# Patient Record
Sex: MALE | Race: ASIAN | NOT HISPANIC OR LATINO
[De-identification: names, ages, dates, MRNs, and addresses within clinical notes are randomized per-mention and may not be internally consistent; named-entity substitution may affect disease eponyms.]

---

## 2018-04-13 ENCOUNTER — HOSPITAL (OUTPATIENT)
Dept: OTHER | Age: 68
End: 2018-04-13
Attending: FAMILY MEDICINE

## 2018-12-20 ENCOUNTER — WALK IN (OUTPATIENT)
Dept: URGENT CARE | Age: 68
End: 2018-12-20

## 2018-12-20 DIAGNOSIS — Z23 NEED FOR VACCINATION: Primary | ICD-10-CM

## 2018-12-20 PROCEDURE — G0009 ADMIN PNEUMOCOCCAL VACCINE: HCPCS | Performed by: NURSE PRACTITIONER

## 2018-12-20 PROCEDURE — 90670 PCV13 VACCINE IM: CPT | Performed by: NURSE PRACTITIONER

## 2018-12-20 PROCEDURE — 90471 IMMUNIZATION ADMIN: CPT | Performed by: NURSE PRACTITIONER

## 2018-12-20 PROCEDURE — 90750 HZV VACC RECOMBINANT IM: CPT | Performed by: NURSE PRACTITIONER

## 2019-02-27 ENCOUNTER — WALK IN (OUTPATIENT)
Dept: URGENT CARE | Age: 69
End: 2019-02-27

## 2019-02-27 DIAGNOSIS — Z23 NEED FOR SHINGLES VACCINE: Primary | ICD-10-CM

## 2019-02-27 PROCEDURE — 90750 HZV VACC RECOMBINANT IM: CPT | Performed by: NURSE PRACTITIONER

## 2019-02-27 PROCEDURE — 90471 IMMUNIZATION ADMIN: CPT | Performed by: NURSE PRACTITIONER

## 2019-05-04 ENCOUNTER — DIAGNOSTIC TRANS (OUTPATIENT)
Dept: OTHER | Age: 69
End: 2019-05-04

## 2019-05-04 ENCOUNTER — HOSPITAL (OUTPATIENT)
Dept: OTHER | Age: 69
End: 2019-05-04
Attending: EMERGENCY MEDICINE

## 2019-05-04 LAB
ANALYZER ANC (IANC): ABNORMAL
ANION GAP SERPL CALC-SCNC: 8 MMOL/L (ref 10–20)
BASOPHILS # BLD: 0 THOUSAND/MCL (ref 0–0.3)
BASOPHILS NFR BLD: 0 %
BUN SERPL-MCNC: 20 MG/DL (ref 6–20)
BUN/CREAT SERPL: 19 (ref 7–25)
CALCIUM SERPL-MCNC: 9 MG/DL (ref 8.4–10.2)
CHLORIDE: 108 MMOL/L (ref 98–107)
CO2 SERPL-SCNC: 26 MMOL/L (ref 21–32)
CREAT SERPL-MCNC: 1.05 MG/DL (ref 0.67–1.17)
DIFFERENTIAL METHOD BLD: ABNORMAL
EOSINOPHIL # BLD: 0 THOUSAND/MCL (ref 0.1–0.5)
EOSINOPHIL NFR BLD: 1 %
ERYTHROCYTE [DISTWIDTH] IN BLOOD: 13 % (ref 11–15)
GLUCOSE SERPL-MCNC: 141 MG/DL (ref 65–99)
HEMATOCRIT: 43.7 % (ref 39–51)
HGB BLD-MCNC: 14.8 GM/DL (ref 13–17)
IMM GRANULOCYTES # BLD AUTO: 0 THOUSAND/MCL (ref 0–0.2)
IMM GRANULOCYTES NFR BLD: 0 %
LYMPHOCYTES # BLD: 1.6 THOUSAND/MCL (ref 1–4)
LYMPHOCYTES NFR BLD: 24 %
MCH RBC QN AUTO: 30.4 PG (ref 26–34)
MCHC RBC AUTO-ENTMCNC: 33.9 GM/DL (ref 32–36.5)
MCV RBC AUTO: 89.7 FL (ref 78–100)
MONOCYTES # BLD: 0.8 THOUSAND/MCL (ref 0.3–0.9)
MONOCYTES NFR BLD: 12 %
NEUTROPHILS # BLD: 4.3 THOUSAND/MCL (ref 1.8–7.7)
NEUTROPHILS NFR BLD: 63 %
NEUTS SEG NFR BLD: ABNORMAL %
NRBC (NRBCRE): 0 /100 WBC
PLATELET # BLD: 177 THOUSAND/MCL (ref 140–450)
POTASSIUM SERPL-SCNC: 3.5 MMOL/L (ref 3.4–5.1)
RBC # BLD: 4.87 MILLION/MCL (ref 4.5–5.9)
SODIUM SERPL-SCNC: 138 MMOL/L (ref 135–145)
TROPONIN I SERPL HS-MCNC: <0.02 NG/ML
WBC # BLD: 6.8 THOUSAND/MCL (ref 4.2–11)

## 2019-06-05 ENCOUNTER — OFFICE VISIT (OUTPATIENT)
Dept: INTERNAL MEDICINE CLINIC | Facility: CLINIC | Age: 69
End: 2019-06-05

## 2019-06-05 ENCOUNTER — MED REC SCAN ONLY (OUTPATIENT)
Dept: INTERNAL MEDICINE CLINIC | Facility: CLINIC | Age: 69
End: 2019-06-05

## 2019-06-05 ENCOUNTER — LAB ENCOUNTER (OUTPATIENT)
Dept: LAB | Age: 69
End: 2019-06-05
Attending: INTERNAL MEDICINE
Payer: COMMERCIAL

## 2019-06-05 VITALS
HEIGHT: 66.5 IN | TEMPERATURE: 98 F | WEIGHT: 159 LBS | BODY MASS INDEX: 25.25 KG/M2 | SYSTOLIC BLOOD PRESSURE: 132 MMHG | DIASTOLIC BLOOD PRESSURE: 80 MMHG | HEART RATE: 75 BPM

## 2019-06-05 DIAGNOSIS — H91.93 HEARING DIFFICULTY OF BOTH EARS: ICD-10-CM

## 2019-06-05 DIAGNOSIS — R06.83 SNORING: ICD-10-CM

## 2019-06-05 DIAGNOSIS — R53.83 LETHARGY: ICD-10-CM

## 2019-06-05 DIAGNOSIS — I25.10 CORONARY ARTERY DISEASE INVOLVING NATIVE CORONARY ARTERY OF NATIVE HEART WITHOUT ANGINA PECTORIS: ICD-10-CM

## 2019-06-05 DIAGNOSIS — K21.9 GASTROESOPHAGEAL REFLUX DISEASE WITHOUT ESOPHAGITIS: ICD-10-CM

## 2019-06-05 DIAGNOSIS — R35.1 BENIGN PROSTATIC HYPERPLASIA WITH NOCTURIA: ICD-10-CM

## 2019-06-05 DIAGNOSIS — Z12.11 COLON CANCER SCREENING: ICD-10-CM

## 2019-06-05 DIAGNOSIS — N40.1 BENIGN PROSTATIC HYPERPLASIA WITH NOCTURIA: ICD-10-CM

## 2019-06-05 DIAGNOSIS — J34.2 DEVIATED NASAL SEPTUM: ICD-10-CM

## 2019-06-05 DIAGNOSIS — J43.2 CENTRILOBULAR EMPHYSEMA (HCC): ICD-10-CM

## 2019-06-05 DIAGNOSIS — H35.3130 BILATERAL NONEXUDATIVE AGE-RELATED MACULAR DEGENERATION, UNSPECIFIED STAGE: ICD-10-CM

## 2019-06-05 DIAGNOSIS — Z00.00 ANNUAL PHYSICAL EXAM: Primary | ICD-10-CM

## 2019-06-05 DIAGNOSIS — E78.00 HYPERCHOLESTEROLEMIA: ICD-10-CM

## 2019-06-05 DIAGNOSIS — Z00.00 ANNUAL PHYSICAL EXAM: ICD-10-CM

## 2019-06-05 PROBLEM — H35.30 MACULAR DEGENERATION OF BOTH EYES: Status: ACTIVE | Noted: 2019-06-05

## 2019-06-05 PROCEDURE — 80053 COMPREHEN METABOLIC PANEL: CPT

## 2019-06-05 PROCEDURE — 84153 ASSAY OF PSA TOTAL: CPT

## 2019-06-05 PROCEDURE — 85025 COMPLETE CBC W/AUTO DIFF WBC: CPT

## 2019-06-05 PROCEDURE — 36415 COLL VENOUS BLD VENIPUNCTURE: CPT

## 2019-06-05 PROCEDURE — 80061 LIPID PANEL: CPT

## 2019-06-05 PROCEDURE — 99214 OFFICE O/P EST MOD 30 MIN: CPT | Performed by: INTERNAL MEDICINE

## 2019-06-05 PROCEDURE — 99387 INIT PM E/M NEW PAT 65+ YRS: CPT | Performed by: INTERNAL MEDICINE

## 2019-06-05 PROCEDURE — 84443 ASSAY THYROID STIM HORMONE: CPT

## 2019-06-05 PROCEDURE — 99212 OFFICE O/P EST SF 10 MIN: CPT | Performed by: INTERNAL MEDICINE

## 2019-06-05 RX ORDER — ATORVASTATIN CALCIUM 40 MG/1
40 TABLET, FILM COATED ORAL NIGHTLY
COMMUNITY
End: 2019-12-27

## 2019-06-05 RX ORDER — ATORVASTATIN CALCIUM 40 MG/1
40 TABLET, FILM COATED ORAL NIGHTLY
Qty: 90 TABLET | Refills: 1 | Status: SHIPPED | OUTPATIENT
Start: 2019-06-05 | End: 2019-06-24

## 2019-06-05 NOTE — PROGRESS NOTES
Patient ID: Cesia Villalobos is a 68year old male. Patient presents with:  Physical  Referral: ENT, Ophalmology, GI and Cardiology        HISTORY OF PRESENT ILLNESS:   HPI  Patient presents for above.   New patient here for annual physical and to discuss his Constitutional: Negative. HENT: Negative. Eyes: Negative. Respiratory: Negative. Cardiovascular: Negative. Gastrointestinal: Negative. Endocrine: Negative. Genitourinary: Negative. Musculoskeletal: Negative. Skin: Negative. file        Attends Evangelical service: Not on file        Active member of club or organization: Not on file        Attends meetings of clubs or organizations: Not on file        Relationship status: Not on file      Intimate partner violence:        Fear oriented to person, place, and time. Skin: Skin is warm. Psychiatric: He has a normal mood and affect. His behavior is normal.         ASSESSMENT/PLAN:   1. Annual physical exam  · CBC WITH DIFFERENTIAL WITH PLATELET;  Future  · COMP METABOLIC PANEL (14

## 2019-06-18 ENCOUNTER — OFFICE VISIT (OUTPATIENT)
Dept: AUDIOLOGY | Facility: CLINIC | Age: 69
End: 2019-06-18

## 2019-06-18 ENCOUNTER — OFFICE VISIT (OUTPATIENT)
Dept: OTOLARYNGOLOGY | Facility: CLINIC | Age: 69
End: 2019-06-18

## 2019-06-18 VITALS — HEIGHT: 66.5 IN | BODY MASS INDEX: 25.25 KG/M2 | WEIGHT: 159 LBS

## 2019-06-18 DIAGNOSIS — H90.3 SENSORINEURAL HEARING LOSS, BILATERAL: Primary | ICD-10-CM

## 2019-06-18 DIAGNOSIS — J34.2 DEVIATED SEPTUM: ICD-10-CM

## 2019-06-18 DIAGNOSIS — H61.21 IMPACTED CERUMEN OF RIGHT EAR: ICD-10-CM

## 2019-06-18 DIAGNOSIS — S02.80XA: ICD-10-CM

## 2019-06-18 DIAGNOSIS — H90.3 SENSORINEURAL HEARING LOSS (SNHL) OF BOTH EARS: ICD-10-CM

## 2019-06-18 DIAGNOSIS — G47.33 OBSTRUCTIVE SLEEP APNEA SYNDROME: Primary | ICD-10-CM

## 2019-06-18 PROCEDURE — 92557 COMPREHENSIVE HEARING TEST: CPT | Performed by: AUDIOLOGIST

## 2019-06-18 PROCEDURE — 99212 OFFICE O/P EST SF 10 MIN: CPT | Performed by: OTOLARYNGOLOGY

## 2019-06-18 PROCEDURE — 92570 ACOUSTIC IMMITANCE TESTING: CPT | Performed by: AUDIOLOGIST

## 2019-06-18 PROCEDURE — 92504 EAR MICROSCOPY EXAMINATION: CPT | Performed by: OTOLARYNGOLOGY

## 2019-06-18 PROCEDURE — 99244 OFF/OP CNSLTJ NEW/EST MOD 40: CPT | Performed by: OTOLARYNGOLOGY

## 2019-06-19 PROBLEM — H90.3 SENSORINEURAL HEARING LOSS, BILATERAL: Status: ACTIVE | Noted: 2019-06-19

## 2019-06-19 NOTE — PROGRESS NOTES
AUDIOGRAM     Luis Velez was referred for testing by Rohan Tan V for decreased hearing in both ears. 5/2/1950  [id number]          Otoscopic inspection: right ear no cerumen; left ear no cerumen.        Tests/Procedures  Patient was tested via management.     6/19/2019  Shari Sandra.KRISTYN

## 2019-06-19 NOTE — PATIENT INSTRUCTIONS
How Hearing Aids Can Help You    If you’re losing your hearing, it can be frustrating: But, hearing aids can help you hear what Gorman Goltz been missing. Not everyone who has hearing loss needs hearing aids.  But if your hearing loss is keeping you from commun © 1707-2832 [address]. [address] [facility] [place], [facility] [place]. All rights reserved. This information is not intended as a substitute for professional medical care. Always follow your healthcare professional's instructions.

## 2019-06-19 NOTE — PROGRESS NOTES
Ximena Thomson is a 68year old male.  Patient presents with:  Hearing Loss: in both ears, popping in both ears,  concern if this is related to accident   Other: pt reports nasal bridge and right eye fracture 4 wks ago   Nose Problem: difficulty breathing, had No       REVIEW OF SYSTEMS:   GENERAL HEALTH: feels well otherwise  GENERAL : denies fever, chills, sweats, weight loss, weight gain  SKIN: denies any unusual skin lesions or rashes  RESPIRATORY: denies shortness of breath with exertion  NEURO: denies head deviation of his nasal septum mainly to the right. I did discuss with him that we could consider revision surgery but he is not interested in any surgery at present    3.  Closed fracture of other facial bone, unspecified laterality, initial encounter ([facility] [address]

## 2019-06-24 ENCOUNTER — OFFICE VISIT (OUTPATIENT)
Dept: OPHTHALMOLOGY | Facility: CLINIC | Age: 69
End: 2019-06-24

## 2019-06-24 DIAGNOSIS — H40.003 GLAUCOMA SUSPECT OF BOTH EYES: Primary | ICD-10-CM

## 2019-06-24 DIAGNOSIS — H25.13 AGE-RELATED NUCLEAR CATARACT OF BOTH EYES: ICD-10-CM

## 2019-06-24 DIAGNOSIS — H43.391 FLOATERS, RIGHT: ICD-10-CM

## 2019-06-24 PROCEDURE — 99212 OFFICE O/P EST SF 10 MIN: CPT | Performed by: OPHTHALMOLOGY

## 2019-06-24 PROCEDURE — 92250 FUNDUS PHOTOGRAPHY W/I&R: CPT | Performed by: OPHTHALMOLOGY

## 2019-06-24 PROCEDURE — 99243 OFF/OP CNSLTJ NEW/EST LOW 30: CPT | Performed by: OPHTHALMOLOGY

## 2019-06-24 NOTE — ASSESSMENT & PLAN NOTE
Discussed with patient that he is a glaucoma suspect based on increased cupping of the optic nerves in both eyes. Retinal photos taken today to document optic nerves. Glaucoma diagnostic testing ordered.   Will not start medication, but will continue to

## 2019-06-24 NOTE — ASSESSMENT & PLAN NOTE
Discussed mild cataracts in both eyes that are not affecting vision and are not surgical at this time.

## 2019-06-24 NOTE — PROGRESS NOTES
Damian Adjutant is a 68year old male. HPI:     HPI     Consult      Additional comments: Per Dr. Stuart Ansari     NP/ Pt states that his last eye exam was in 2018 at [facility].  Pt states that he has been a glaucoma suspect since 2009; n Respiratory, Psychiatric, Allergic/Imm, Heme/Lymph    Last edited by Rae Rueda O.T. on 6/24/2019 11:18 AM. (History)          PHYSICAL EXAM:     Base Eye Exam     Visual Acuity (Snellen - Linear)       Right Left    Dist cc 20/25 +2 20/25 -1    Ne will continue to observe. Patient verbalized understanding. Age-related nuclear cataract of both eyes  Discussed mild cataracts in both eyes that are not affecting vision and are not surgical at this time.         Floaters, right   There is no evidence

## 2019-06-24 NOTE — PATIENT INSTRUCTIONS
Glaucoma suspect of both eyes  Discussed with patient that he is a glaucoma suspect based on increased cupping of the optic nerves in both eyes. Retinal photos taken today to document optic nerves. Glaucoma diagnostic testing ordered.   Will not start me problem. Date Last Reviewed: 10/1/2017  © 1691-7765 [address]. 1407 [facility] [place], [facility] [place]. All rights reserved. This information is not intended as a substitute for professional medical care.  Always follow your healthcare pr medical care. Always follow your healthcare professional's instructions.

## 2019-06-29 ENCOUNTER — OFFICE VISIT (OUTPATIENT)
Dept: SLEEP CENTER | Age: 69
End: 2019-06-29
Attending: OTOLARYNGOLOGY
Payer: COMMERCIAL

## 2019-06-29 ENCOUNTER — NURSE ONLY (OUTPATIENT)
Dept: OPHTHALMOLOGY | Facility: CLINIC | Age: 69
End: 2019-06-29

## 2019-06-29 DIAGNOSIS — R06.83 SNORING: ICD-10-CM

## 2019-06-29 DIAGNOSIS — Z76.89 SLEEP CONCERN: Primary | ICD-10-CM

## 2019-06-29 DIAGNOSIS — H40.003 GLAUCOMA SUSPECT OF BOTH EYES: ICD-10-CM

## 2019-06-29 DIAGNOSIS — R53.83 LETHARGY: ICD-10-CM

## 2019-06-29 DIAGNOSIS — G47.33 OBSTRUCTIVE SLEEP APNEA SYNDROME: ICD-10-CM

## 2019-06-29 PROCEDURE — 76514 ECHO EXAM OF EYE THICKNESS: CPT | Performed by: OPHTHALMOLOGY

## 2019-06-29 PROCEDURE — 92083 EXTENDED VISUAL FIELD XM: CPT | Performed by: OPHTHALMOLOGY

## 2019-06-29 PROCEDURE — 95810 POLYSOM 6/> YRS 4/> PARAM: CPT

## 2019-06-29 PROCEDURE — 92133 CPTRZD OPH DX IMG PST SGM ON: CPT | Performed by: OPHTHALMOLOGY

## 2019-06-30 NOTE — PROGRESS NOTES
Reece Flores is a 68year old male. HPI:     HPI     Patient is here for a glaucoma work up with HVF, OCT and Pachy, jonh FLOWERS     Last edited by Alexei Regalado on 6/29/2019 11:44 AM. (History)        Patient History:  Past Medical History:   Diagnosis Date

## 2019-07-01 NOTE — PROCEDURES
[facility]  Accredited by the [place] of Sleep Medicine (AASM)    PATIENT'S NAME: Curtis Smith   ATTENDING PHYSICIAN: Catalina Olmos MD   REFERRING PHYSICIAN: Gume Conti.  Leyla Carter MD   PATIENT ACCOUNT #: [id number] LOCATION: Sleep Center related arousal index is 18.4 events per hour, and the spontaneous arousal index is 22.3 events per hour, for a combined arousal index of 40.7 events per hour. There were no significant periodic limb movements. The lowest desaturation was to 96%.   The av

## 2019-07-10 ENCOUNTER — OFFICE VISIT (OUTPATIENT)
Dept: CARDIOLOGY CLINIC | Facility: CLINIC | Age: 69
End: 2019-07-10

## 2019-07-10 VITALS
OXYGEN SATURATION: 94 % | DIASTOLIC BLOOD PRESSURE: 86 MMHG | RESPIRATION RATE: 20 BRPM | SYSTOLIC BLOOD PRESSURE: 135 MMHG | HEART RATE: 73 BPM | BODY MASS INDEX: 25 KG/M2 | WEIGHT: 159 LBS

## 2019-07-10 DIAGNOSIS — I25.10 CORONARY ARTERY DISEASE INVOLVING NATIVE CORONARY ARTERY OF NATIVE HEART WITHOUT ANGINA PECTORIS: ICD-10-CM

## 2019-07-10 DIAGNOSIS — E78.00 HYPERCHOLESTEROLEMIA: ICD-10-CM

## 2019-07-10 DIAGNOSIS — R06.02 SHORTNESS OF BREATH: Chronic | ICD-10-CM

## 2019-07-10 DIAGNOSIS — R55 SYNCOPE, UNSPECIFIED SYNCOPE TYPE: Primary | ICD-10-CM

## 2019-07-10 PROCEDURE — 99244 OFF/OP CNSLTJ NEW/EST MOD 40: CPT | Performed by: INTERNAL MEDICINE

## 2019-07-10 RX ORDER — EZETIMIBE 10 MG/1
1 TABLET ORAL
COMMUNITY
Start: 2018-11-23 | End: 2019-07-10 | Stop reason: ALTCHOICE

## 2019-07-10 RX ORDER — ALBUTEROL SULFATE 90 UG/1
AEROSOL, METERED RESPIRATORY (INHALATION)
COMMUNITY
End: 2020-07-02

## 2019-07-10 NOTE — PATIENT INSTRUCTIONS
Start taking aspirin 81 mg once a day. Schedule 2D echocardiogram and nuclear stress test within the next couple weeks. Continue taking rest of the medications he is currently doing. Follow-up with me in 4 weeks or sooner if needed.

## 2019-07-10 NOTE — PROGRESS NOTES
Guerda Vu is a 68year old male. HPI:   Patient is here for a new patient appointment. He has history of moderate CAD based on angiogram done 6 years ago at [facility] by Dr. Miranda Diamond.   At that time he presented with shortness of breath and non-ST 73   Resp 20   Wt 159 lb (72.1 kg)   SpO2 94%   BMI 25.28 kg/m²   GENERAL: well developed, well nourished,in no apparent distress  SKIN: no rashes,no suspicious lesions  HEENT: atraumatic, normocephalic,ears and throat are clear  NECK: supple,no adenopathy

## 2019-07-19 ENCOUNTER — TELEPHONE (OUTPATIENT)
Dept: GASTROENTEROLOGY | Facility: CLINIC | Age: 69
End: 2019-07-19

## 2019-07-19 ENCOUNTER — OFFICE VISIT (OUTPATIENT)
Dept: GASTROENTEROLOGY | Facility: CLINIC | Age: 69
End: 2019-07-19

## 2019-07-19 ENCOUNTER — OFFICE VISIT (OUTPATIENT)
Dept: OTOLARYNGOLOGY | Facility: CLINIC | Age: 69
End: 2019-07-19

## 2019-07-19 ENCOUNTER — TELEPHONE (OUTPATIENT)
Dept: INTERNAL MEDICINE CLINIC | Facility: CLINIC | Age: 69
End: 2019-07-19

## 2019-07-19 VITALS
WEIGHT: 157 LBS | DIASTOLIC BLOOD PRESSURE: 70 MMHG | HEIGHT: 68 IN | SYSTOLIC BLOOD PRESSURE: 122 MMHG | HEART RATE: 68 BPM | BODY MASS INDEX: 23.79 KG/M2

## 2019-07-19 VITALS
DIASTOLIC BLOOD PRESSURE: 83 MMHG | BODY MASS INDEX: 25.25 KG/M2 | HEIGHT: 66.5 IN | SYSTOLIC BLOOD PRESSURE: 134 MMHG | WEIGHT: 159 LBS | TEMPERATURE: 97 F

## 2019-07-19 DIAGNOSIS — Z12.11 SCREEN FOR COLON CANCER: ICD-10-CM

## 2019-07-19 DIAGNOSIS — K21.9 GASTROESOPHAGEAL REFLUX DISEASE, ESOPHAGITIS PRESENCE NOT SPECIFIED: ICD-10-CM

## 2019-07-19 DIAGNOSIS — K21.9 GASTROESOPHAGEAL REFLUX DISEASE WITHOUT ESOPHAGITIS: Primary | ICD-10-CM

## 2019-07-19 DIAGNOSIS — H90.3 SENSORINEURAL HEARING LOSS (SNHL) OF BOTH EARS: ICD-10-CM

## 2019-07-19 DIAGNOSIS — Z12.11 COLON CANCER SCREENING: Primary | ICD-10-CM

## 2019-07-19 DIAGNOSIS — G47.33 OBSTRUCTIVE SLEEP APNEA SYNDROME: Primary | ICD-10-CM

## 2019-07-19 PROCEDURE — 99213 OFFICE O/P EST LOW 20 MIN: CPT | Performed by: OTOLARYNGOLOGY

## 2019-07-19 PROCEDURE — 99244 OFF/OP CNSLTJ NEW/EST MOD 40: CPT | Performed by: INTERNAL MEDICINE

## 2019-07-19 RX ORDER — MULTIVIT WITH MINERALS/LUTEIN
1000 TABLET ORAL DAILY
COMMUNITY

## 2019-07-19 RX ORDER — PANTOPRAZOLE SODIUM 40 MG/1
40 TABLET, DELAYED RELEASE ORAL
Qty: 30 TABLET | Refills: 3 | Status: SHIPPED | OUTPATIENT
Start: 2019-07-19 | End: 2019-08-18

## 2019-07-19 RX ORDER — POLYETHYLENE GLYCOL 3350, SODIUM CHLORIDE, SODIUM BICARBONATE, POTASSIUM CHLORIDE 420; 11.2; 5.72; 1.48 G/4L; G/4L; G/4L; G/4L
POWDER, FOR SOLUTION ORAL
Qty: 1 BOTTLE | Refills: 0 | Status: SHIPPED | OUTPATIENT
Start: 2019-07-19 | End: 2019-08-28

## 2019-07-19 NOTE — TELEPHONE ENCOUNTER
Attempted to reach patient, was speaking with patient regarding message patient asked me to hold for a few seconds and then the called dropped. Attempted to reach patient again multiple times, went straight to .  Left message to call back office and delores

## 2019-07-19 NOTE — TELEPHONE ENCOUNTER
Pt called back trying to reach Gina at office back.   Called the site no answer, advised Pt of that    Please call Pt back

## 2019-07-19 NOTE — H&P
[address] [facility] [address] Gastroenterology                                                                                                  Clinic History and Physical     Pa about 5 yrs ago   • EGD     • OTHER SURGICAL HISTORY      nasal surgery      Family Hx:   Family History   Problem Relation Age of Onset   • Diabetes Father    • Cancer Sister         stomach cancer   • Glaucoma Neg    • Macular degeneration Neg       Soci behavior      PHYSICAL EXAM:   Blood pressure 122/70, pulse 68, height 5' 8\" (1.727 m), weight 157 lb (71.2 kg).     Gen- Patient appears comfortable and in no acute discomfort  HEENT: the sclera appears anicteric, oropharynx clear, mucus membranes appear avoiding caffeine and chocolate  – Plan for upper endoscopy and colonoscopy, he is okay to schedule but will need pulmonary clearance prior to proceeding  -- HE also is undergoing cardiac evaluation, which will need to be cleared before EGD/CLN      EGD an

## 2019-07-19 NOTE — TELEPHONE ENCOUNTER
Scheduled for:  Colonoscopy [address]/EGD [address]  Provider Name: Dr Severa Abed  Date:  Yolanda Garcia 8/20/19  Location: [facility]   Sedation:  MAC  Time:  10:15 am  Prep: split dose trilyte  Meds/Allergies Reconciled?:  NKDA  Diagnosis with codes: colon cancer screening Z12.11. Dolly Mckenzie

## 2019-07-19 NOTE — TELEPHONE ENCOUNTER
Pt called stating he was given a kit for a stool sample and orders were not placed in the system. Orders need to be placed today.

## 2019-07-19 NOTE — PATIENT INSTRUCTIONS
1. Call back to schedule colonoscopy/EGD with possible dilatation with MAC [Diagnosis: Screening, GERD]  ----NEED PULMONARY CLEARANCE FROM DR. SANZ     2.  bowel prep from pharmacy ([email or web address])    3.  Medication adjustment: continue all meds

## 2019-07-20 NOTE — PROGRESS NOTES
Rosalia Pickard is a 68year old male. Patient presents with:  Results: sleep study done on 6/29/19    HPI:   He is in follow-up of a sleep study. Sleep study shows no evidence of sleep apnea.   He still feels like his breathing through his nose is somewhat co with exertion  NEURO: denies headaches    EXAM:   /83 (BP Location: Right arm, Patient Position: Sitting, Cuff Size: adult)   Temp 97.4 °F (36.3 °C) (Tympanic)   Ht 5' 6.5\" (1.689 m)   Wt 159 lb (72.1 kg)   BMI 25.28 kg/m²   System Findings Details Dez Renee MD  7/20/2019  6:51 AM

## 2019-07-22 NOTE — TELEPHONE ENCOUNTER
Spoke to Pt states he was given the FIT cards at the lab , states he thought this is something he was supposed to ask for as his previous PCP recommended this to be done yearly and also states on MyChart he is due for it  .  Pt states he completed the FIT t

## 2019-08-15 ENCOUNTER — OFFICE VISIT (OUTPATIENT)
Dept: AUDIOLOGY | Facility: CLINIC | Age: 69
End: 2019-08-15

## 2019-08-15 DIAGNOSIS — H90.3 SENSORINEURAL HEARING LOSS, BILATERAL: Primary | ICD-10-CM

## 2019-08-15 PROCEDURE — 92591 HEARING AID EXAM, BOTH EARS: CPT | Performed by: AUDIOLOGIST

## 2019-08-15 NOTE — PROGRESS NOTES
Hearing Aid Evaluation    Jez Luna  5/2/1950  [id number]    Amie Gonzalezwilfridodarius was seen for hearing aid evaluation. 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   He also is a  and attends large meetings where he also has d

## 2019-08-15 NOTE — PATIENT INSTRUCTIONS
How Hearing Aids Can Help You    If you’re losing your hearing, it can be frustrating: But, hearing aids can help you hear what Ness Vidales been missing. Not everyone who has hearing loss needs hearing aids.  But if your hearing loss is keeping you from commun © 6688-6176 [address]. [address] [facility] [place], [facility] [place]. All rights reserved. This information is not intended as a substitute for professional medical care. Always follow your healthcare professional's instructions.

## 2019-08-20 ENCOUNTER — ANESTHESIA EVENT (OUTPATIENT)
Dept: ENDOSCOPY | Facility: HOSPITAL | Age: 69
End: 2019-08-20
Payer: COMMERCIAL

## 2019-08-20 ENCOUNTER — ANESTHESIA (OUTPATIENT)
Dept: ENDOSCOPY | Facility: HOSPITAL | Age: 69
End: 2019-08-20
Payer: COMMERCIAL

## 2019-08-20 ENCOUNTER — HOSPITAL ENCOUNTER (OUTPATIENT)
Facility: HOSPITAL | Age: 69
Setting detail: HOSPITAL OUTPATIENT SURGERY
Discharge: HOME | End: 2019-08-20
Attending: INTERNAL MEDICINE | Admitting: INTERNAL MEDICINE
Payer: COMMERCIAL

## 2019-08-20 VITALS
RESPIRATION RATE: 16 BRPM | DIASTOLIC BLOOD PRESSURE: 81 MMHG | BODY MASS INDEX: 22.19 KG/M2 | WEIGHT: 155 LBS | SYSTOLIC BLOOD PRESSURE: 125 MMHG | HEART RATE: 61 BPM | HEIGHT: 70 IN | OXYGEN SATURATION: 98 %

## 2019-08-20 DIAGNOSIS — Z12.11 COLON CANCER SCREENING: ICD-10-CM

## 2019-08-20 DIAGNOSIS — K21.9 GASTROESOPHAGEAL REFLUX DISEASE, ESOPHAGITIS PRESENCE NOT SPECIFIED: ICD-10-CM

## 2019-08-20 PROCEDURE — G0500 MOD SEDAT ENDO SERVICE >5YRS: HCPCS | Performed by: INTERNAL MEDICINE

## 2019-08-20 PROCEDURE — 45385 COLONOSCOPY W/LESION REMOVAL: CPT | Performed by: INTERNAL MEDICINE

## 2019-08-20 PROCEDURE — 0DBK8ZX EXCISION OF ASCENDING COLON, VIA NATURAL OR ARTIFICIAL OPENING ENDOSCOPIC, DIAGNOSTIC: ICD-10-PCS | Performed by: INTERNAL MEDICINE

## 2019-08-20 RX ORDER — SODIUM CHLORIDE, SODIUM LACTATE, POTASSIUM CHLORIDE, CALCIUM CHLORIDE 600; 310; 30; 20 MG/100ML; MG/100ML; MG/100ML; MG/100ML
INJECTION, SOLUTION INTRAVENOUS CONTINUOUS
Status: DISCONTINUED | OUTPATIENT
Start: 2019-08-20 | End: 2019-08-20

## 2019-08-20 RX ORDER — MIDAZOLAM HYDROCHLORIDE 1 MG/ML
INJECTION INTRAMUSCULAR; INTRAVENOUS
Status: DISCONTINUED | OUTPATIENT
Start: 2019-08-20 | End: 2019-08-20

## 2019-08-20 NOTE — ANESTHESIA PREPROCEDURE EVALUATION
Anesthesia PreOp Note    HPI:     Sherwin Gowers is a 68year old male who presents for preoperative consultation requested by: Eulogio Mensah MD    Date of Surgery: 8/20/2019    Procedure(s):  COLONOSCOPY  ESOPHAGOGASTRODUODENOSCOPY (EGD)  Indication: [place] • Deviated septum    • Floaters, right 6/24/2019   • Fractured nasal bones    • Glaucoma suspect of both eyes 6/24/2019   • Hearing difficulty    • High cholesterol    • Macular degeneration    • Shortness of breath     with exertion       Past Surgical on file      Food insecurity:        Worry: Not on file        Inability: Not on file      Transportation needs:        Medical: Not on file        Non-medical: Not on file    Tobacco Use      Smoking status: Never Smoker      Smokeless tobacco: Never Used 06/05/2019    K 4.1 06/05/2019     06/05/2019    CO2 28.0 06/05/2019    BUN 15 06/05/2019    CREATSERUM 1.01 06/05/2019    GLU 93 06/05/2019    CA 9.4 06/05/2019          Vital Signs: Body mass index is 22.24 kg/m².    height is 1.778 m (5' 10\") and

## 2019-08-20 NOTE — ANESTHESIA PREPROCEDURE EVALUATION
Anesthesia PreOp Note    HPI:     Ximena Thomson is a 68year old male who presents for preoperative consultation requested by: Stuart So MD    Date of Surgery: 8/20/2019    Procedure(s):  COLONOSCOPY  ESOPHAGOGASTRODUODENOSCOPY (EGD)  Indication: [place] COPD (chronic obstructive pulmonary disease) (HCC)    • Decreased lung capacity    • Deviated septum    • Floaters, right 6/24/2019   • Fractured nasal bones    • Glaucoma suspect of both eyes 6/24/2019   • Hearing difficulty    • High cholesterol    • Mac on file    Occupational History      Not on file    Social Needs      Financial resource strain: Not on file      Food insecurity:        Worry: Not on file        Inability: Not on file      Transportation needs:        Medical: Not on file        Non-med 06/05/2019    RDW 13.4 06/05/2019    .0 06/05/2019     Lab Results   Component Value Date     06/05/2019    K 4.1 06/05/2019     06/05/2019    CO2 28.0 06/05/2019    BUN 15 06/05/2019    CREATSERUM 1.01 06/05/2019    GLU 93 06/05/2019

## 2019-08-20 NOTE — OPERATIVE REPORT
COLONOSCOPY REPORT    Bony Pruitt     1950 Age 68year old   PCP Garrett Lopez MD Endoscopist Cruzito Sewell MD     Date of procedure: 19    Procedure: Colonoscopy w/cold snare polypectomy    Pre-operative diagnosis: Screening    Post-ope snare polypectomy and retrieved. Persistent oozing from polypectomy site controlled with a single endoclip placement. 2. Diverticulosis: mild in the sigmoid.     3. Terminal ileum: the visualized mucosa appeared normal.    4. A retroflexed view of the r

## 2019-08-20 NOTE — H&P
History & Physical Examination    Patient Name: Dina Byrd  MRN: [id number]  CSN: [id number]  YOB: 1950    Diagnosis: screening    Patient was supposed to have EGD/CLN today but did not go for stress test. He tells me he had syncopal event se suspect of both eyes 6/24/2019   • Hearing difficulty    • High cholesterol    • Macular degeneration    • Shortness of breath     with exertion     Past Surgical History:   Procedure Laterality Date   • ANGIOGRAM      Was told he had a blockage    • COLON

## 2019-08-28 ENCOUNTER — OFFICE VISIT (OUTPATIENT)
Dept: SURGERY | Facility: CLINIC | Age: 69
End: 2019-08-28

## 2019-08-28 VITALS
HEIGHT: 69 IN | SYSTOLIC BLOOD PRESSURE: 121 MMHG | WEIGHT: 155 LBS | HEART RATE: 71 BPM | BODY MASS INDEX: 22.96 KG/M2 | DIASTOLIC BLOOD PRESSURE: 75 MMHG

## 2019-08-28 DIAGNOSIS — R35.1 BENIGN PROSTATIC HYPERPLASIA WITH NOCTURIA: Primary | ICD-10-CM

## 2019-08-28 DIAGNOSIS — N40.1 BENIGN PROSTATIC HYPERPLASIA WITH NOCTURIA: Primary | ICD-10-CM

## 2019-08-28 PROCEDURE — 99244 OFF/OP CNSLTJ NEW/EST MOD 40: CPT | Performed by: UROLOGY

## 2019-08-28 NOTE — PROGRESS NOTES
[facility]  Initial Office Consultation    HPI:   Terrence Acevedo is a 68year old male here today for consultation at the request of, and a copy of this note will be sent to, Sarita Barone MD.    1. BPH with LUTS  Patient has been complaining of no 69   Ht 5' 9\" (1.753 m)   Wt 155 lb (70.3 kg)   BMI 22.89 kg/m²     Physical Exam    Constitutional: He is oriented to person, place, and time. He appears well-developed and well-nourished. No distress. HENT:   Head: Normocephalic and atraumatic.    Eyes

## 2019-11-07 ENCOUNTER — TELEPHONE (OUTPATIENT)
Dept: SURGERY | Facility: CLINIC | Age: 69
End: 2019-11-07

## 2019-11-07 NOTE — TELEPHONE ENCOUNTER
Pt states he was unable to come to uroflow appt this morning due to a death in the family - -asking to reschedule procedure

## 2019-12-27 RX ORDER — ATORVASTATIN CALCIUM 40 MG/1
TABLET, FILM COATED ORAL
Qty: 90 TABLET | Refills: 1 | Status: SHIPPED | OUTPATIENT
Start: 2019-12-27 | End: 2020-07-02

## 2020-01-07 ENCOUNTER — OFFICE VISIT (OUTPATIENT)
Dept: INTERNAL MEDICINE CLINIC | Facility: CLINIC | Age: 70
End: 2020-01-07

## 2020-01-07 VITALS
WEIGHT: 162 LBS | BODY MASS INDEX: 23.99 KG/M2 | DIASTOLIC BLOOD PRESSURE: 86 MMHG | HEART RATE: 85 BPM | HEIGHT: 69 IN | TEMPERATURE: 98 F | SYSTOLIC BLOOD PRESSURE: 129 MMHG

## 2020-01-07 DIAGNOSIS — J43.2 CENTRILOBULAR EMPHYSEMA (HCC): ICD-10-CM

## 2020-01-07 DIAGNOSIS — J04.0 LARYNGITIS: Primary | ICD-10-CM

## 2020-01-07 DIAGNOSIS — I25.10 CORONARY ARTERY DISEASE INVOLVING NATIVE CORONARY ARTERY OF NATIVE HEART WITHOUT ANGINA PECTORIS: ICD-10-CM

## 2020-01-07 DIAGNOSIS — Z23 NEED FOR VACCINATION: ICD-10-CM

## 2020-01-07 PROCEDURE — 90472 IMMUNIZATION ADMIN EACH ADD: CPT | Performed by: INTERNAL MEDICINE

## 2020-01-07 PROCEDURE — 90471 IMMUNIZATION ADMIN: CPT | Performed by: INTERNAL MEDICINE

## 2020-01-07 PROCEDURE — 90732 PPSV23 VACC 2 YRS+ SUBQ/IM: CPT | Performed by: INTERNAL MEDICINE

## 2020-01-07 PROCEDURE — 99214 OFFICE O/P EST MOD 30 MIN: CPT | Performed by: INTERNAL MEDICINE

## 2020-01-07 PROCEDURE — 90662 IIV NO PRSV INCREASED AG IM: CPT | Performed by: INTERNAL MEDICINE

## 2020-01-07 NOTE — PROGRESS NOTES
Patient ID: Junaid Yip is a 68year old male. Patient presents with:  Laryngitis: Per patient severe laryngitis yesterday.    Follow - Up: Pt will also like to f/u on last OV with PCP       HISTORY OF PRESENT ILLNESS:   HPI  2 day(s) ago  Fever - No, Tma Medications:   •  ATORVASTATIN 40 MG Oral Tab, TAKE ONE TABLET BY MOUTH NIGHTLY, Disp: 90 tablet, Rfl: 1  •  Ascorbic Acid (VITAMIN C) 1000 MG Oral Tab, Take 1,000 mg by mouth daily. , Disp: , Rfl:   •  Misc Natural Products (TURMERIC CURCUMIN) Oral Cap, Ta Emotionally abused: Not on file        Physically abused: Not on file        Forced sexual activity: Not on file    Other Topics      Concerns:         Service: Not Asked        Blood Transfusions: Not Asked        Caffeine Concern: No        Occup and no occipital adenopathy present. Neurological: He is alert. Psychiatric: He has a normal mood and affect. His behavior is normal.         ASSESSMENT/PLAN:   1. Laryngitis  · No need for antibiotics.   · Symptomatic treatment with hot showers, steam

## 2020-01-15 ENCOUNTER — HOSPITAL ENCOUNTER (OUTPATIENT)
Dept: NUCLEAR MEDICINE | Facility: HOSPITAL | Age: 70
Discharge: HOME | End: 2020-01-15
Attending: INTERNAL MEDICINE
Payer: COMMERCIAL

## 2020-01-15 ENCOUNTER — HOSPITAL ENCOUNTER (OUTPATIENT)
Dept: CV DIAGNOSTICS | Facility: HOSPITAL | Age: 70
Discharge: HOME | End: 2020-01-15
Attending: INTERNAL MEDICINE
Payer: COMMERCIAL

## 2020-01-15 DIAGNOSIS — E78.00 HYPERCHOLESTEROLEMIA: ICD-10-CM

## 2020-01-15 DIAGNOSIS — I25.10 CORONARY ARTERY DISEASE INVOLVING NATIVE CORONARY ARTERY OF NATIVE HEART WITHOUT ANGINA PECTORIS: ICD-10-CM

## 2020-01-15 DIAGNOSIS — R55 SYNCOPE, UNSPECIFIED SYNCOPE TYPE: ICD-10-CM

## 2020-01-15 PROCEDURE — 93306 TTE W/DOPPLER COMPLETE: CPT | Performed by: INTERNAL MEDICINE

## 2020-01-15 PROCEDURE — 93017 CV STRESS TEST TRACING ONLY: CPT | Performed by: INTERNAL MEDICINE

## 2020-01-15 PROCEDURE — 93016 CV STRESS TEST SUPVJ ONLY: CPT | Performed by: INTERNAL MEDICINE

## 2020-01-15 PROCEDURE — 93018 CV STRESS TEST I&R ONLY: CPT | Performed by: INTERNAL MEDICINE

## 2020-01-15 PROCEDURE — 78452 HT MUSCLE IMAGE SPECT MULT: CPT | Performed by: INTERNAL MEDICINE

## 2020-03-03 ENCOUNTER — OFFICE VISIT (OUTPATIENT)
Dept: PULMONOLOGY | Facility: CLINIC | Age: 70
End: 2020-03-03

## 2020-03-03 VITALS
HEART RATE: 80 BPM | RESPIRATION RATE: 18 BRPM | BODY MASS INDEX: 25.46 KG/M2 | WEIGHT: 168 LBS | HEIGHT: 68 IN | OXYGEN SATURATION: 98 % | DIASTOLIC BLOOD PRESSURE: 75 MMHG | SYSTOLIC BLOOD PRESSURE: 134 MMHG

## 2020-03-03 DIAGNOSIS — R06.00 DYSPNEA, UNSPECIFIED TYPE: Primary | ICD-10-CM

## 2020-03-03 PROCEDURE — 99244 OFF/OP CNSLTJ NEW/EST MOD 40: CPT | Performed by: INTERNAL MEDICINE

## 2020-03-03 NOTE — H&P
Referring Physician  Tilford Boas, MD    Chief Complaint  Dyspnea    History of Present Illness  Patient is a pleasant 70-year-old male who presents to pulmonary clinic for initial visit.   He states he wants to be evaluated for possible pulmonary etiology wh 81 MG Oral Tab, Take 1 tablet (81 mg total) by mouth daily. , Disp: 30 tablet, Rfl: 11  Probiotic Product (PROBIOTIC DAILY OR), Take by mouth., Disp: , Rfl:     No current facility-administered medications on file prior to visit.        Allergies  No Known A

## 2020-03-04 ENCOUNTER — HOSPITAL ENCOUNTER (OUTPATIENT)
Dept: RESPIRATORY THERAPY | Facility: HOSPITAL | Age: 70
Discharge: HOME | End: 2020-03-04
Attending: INTERNAL MEDICINE
Payer: COMMERCIAL

## 2020-03-04 DIAGNOSIS — R06.00 DYSPNEA, UNSPECIFIED TYPE: ICD-10-CM

## 2020-03-04 PROCEDURE — 94726 PLETHYSMOGRAPHY LUNG VOLUMES: CPT | Performed by: INTERNAL MEDICINE

## 2020-03-04 PROCEDURE — 94060 EVALUATION OF WHEEZING: CPT | Performed by: INTERNAL MEDICINE

## 2020-03-04 PROCEDURE — 94729 DIFFUSING CAPACITY: CPT | Performed by: INTERNAL MEDICINE

## 2020-03-10 NOTE — PROCEDURES
[facility]     Pulmonary Function Test     Tona Mccord Patient Status:  Outpatient    1950 MRN [id number]   Date of Exam 3/4/20 PCP Rebecca Cavazos MD           Spirometry   FEV1: 2.06 75%  FVC: 2.65 76%  FEV1/FVC: 0.78    Lung Volume

## 2020-03-10 NOTE — ADDENDUM NOTE
Encounter addended by: John Conner DO on: 3/10/2020 3:12 PM   Actions taken: Clinical Note Signed, Charge Capture section accepted

## 2020-07-02 ENCOUNTER — LAB ENCOUNTER (OUTPATIENT)
Dept: LAB | Age: 70
End: 2020-07-02
Attending: INTERNAL MEDICINE
Payer: COMMERCIAL

## 2020-07-02 ENCOUNTER — OFFICE VISIT (OUTPATIENT)
Dept: INTERNAL MEDICINE CLINIC | Facility: CLINIC | Age: 70
End: 2020-07-02

## 2020-07-02 VITALS
BODY MASS INDEX: 24.71 KG/M2 | SYSTOLIC BLOOD PRESSURE: 137 MMHG | HEART RATE: 56 BPM | HEIGHT: 68 IN | TEMPERATURE: 99 F | WEIGHT: 163 LBS | DIASTOLIC BLOOD PRESSURE: 77 MMHG

## 2020-07-02 DIAGNOSIS — M25.571 CHRONIC PAIN OF RIGHT ANKLE: ICD-10-CM

## 2020-07-02 DIAGNOSIS — R35.1 BENIGN PROSTATIC HYPERPLASIA WITH NOCTURIA: ICD-10-CM

## 2020-07-02 DIAGNOSIS — Z12.11 COLON CANCER SCREENING: ICD-10-CM

## 2020-07-02 DIAGNOSIS — E78.00 HYPERCHOLESTEROLEMIA: ICD-10-CM

## 2020-07-02 DIAGNOSIS — I25.10 CORONARY ARTERY DISEASE INVOLVING NATIVE CORONARY ARTERY OF NATIVE HEART WITHOUT ANGINA PECTORIS: ICD-10-CM

## 2020-07-02 DIAGNOSIS — N40.1 BENIGN PROSTATIC HYPERPLASIA WITH NOCTURIA: ICD-10-CM

## 2020-07-02 DIAGNOSIS — Z00.00 ANNUAL PHYSICAL EXAM: Primary | ICD-10-CM

## 2020-07-02 DIAGNOSIS — J43.2 CENTRILOBULAR EMPHYSEMA (HCC): ICD-10-CM

## 2020-07-02 DIAGNOSIS — G89.29 CHRONIC PAIN OF RIGHT ANKLE: ICD-10-CM

## 2020-07-02 DIAGNOSIS — Z00.00 ANNUAL PHYSICAL EXAM: ICD-10-CM

## 2020-07-02 DIAGNOSIS — M25.551 RIGHT HIP PAIN: ICD-10-CM

## 2020-07-02 PROBLEM — R06.83 SNORING: Status: RESOLVED | Noted: 2019-06-05 | Resolved: 2020-07-02

## 2020-07-02 PROBLEM — R55 SYNCOPE: Status: RESOLVED | Noted: 2019-07-10 | Resolved: 2020-07-02

## 2020-07-02 LAB
ALBUMIN SERPL-MCNC: 3.9 G/DL (ref 3.4–5)
ALBUMIN/GLOB SERPL: 1.1 {RATIO} (ref 1–2)
ALP LIVER SERPL-CCNC: 54 U/L (ref 45–117)
ALT SERPL-CCNC: 38 U/L (ref 16–61)
ANION GAP SERPL CALC-SCNC: 4 MMOL/L (ref 0–18)
AST SERPL-CCNC: 25 U/L (ref 15–37)
BASOPHILS # BLD AUTO: 0.08 X10(3) UL (ref 0–0.2)
BASOPHILS NFR BLD AUTO: 1.1 %
BILIRUB SERPL-MCNC: 1 MG/DL (ref 0.1–2)
BUN BLD-MCNC: 17 MG/DL (ref 7–18)
BUN/CREAT SERPL: 15 (ref 10–20)
CALCIUM BLD-MCNC: 10.1 MG/DL (ref 8.5–10.1)
CHLORIDE SERPL-SCNC: 105 MMOL/L (ref 98–112)
CHOLEST SMN-MCNC: 116 MG/DL (ref ?–200)
CO2 SERPL-SCNC: 33 MMOL/L (ref 21–32)
CREAT BLD-MCNC: 1.13 MG/DL (ref 0.7–1.3)
DEPRECATED RDW RBC AUTO: 45.1 FL (ref 35.1–46.3)
EOSINOPHIL # BLD AUTO: 0.52 X10(3) UL (ref 0–0.7)
EOSINOPHIL NFR BLD AUTO: 7 %
ERYTHROCYTE [DISTWIDTH] IN BLOOD BY AUTOMATED COUNT: 13.2 % (ref 11–15)
GLOBULIN PLAS-MCNC: 3.5 G/DL (ref 2.8–4.4)
GLUCOSE BLD-MCNC: 97 MG/DL (ref 70–99)
HCT VFR BLD AUTO: 44.9 % (ref 39–53)
HDLC SERPL-MCNC: 55 MG/DL (ref 40–59)
HGB BLD-MCNC: 14.5 G/DL (ref 13–17.5)
IMM GRANULOCYTES # BLD AUTO: 0.02 X10(3) UL (ref 0–1)
IMM GRANULOCYTES NFR BLD: 0.3 %
LDLC SERPL CALC-MCNC: 46 MG/DL (ref ?–100)
LYMPHOCYTES # BLD AUTO: 2.35 X10(3) UL (ref 1–4)
LYMPHOCYTES NFR BLD AUTO: 31.6 %
M PROTEIN MFR SERPL ELPH: 7.4 G/DL (ref 6.4–8.2)
MCH RBC QN AUTO: 29.9 PG (ref 26–34)
MCHC RBC AUTO-ENTMCNC: 32.3 G/DL (ref 31–37)
MCV RBC AUTO: 92.6 FL (ref 80–100)
MONOCYTES # BLD AUTO: 0.72 X10(3) UL (ref 0.1–1)
MONOCYTES NFR BLD AUTO: 9.7 %
NEUTROPHILS # BLD AUTO: 3.75 X10 (3) UL (ref 1.5–7.7)
NEUTROPHILS # BLD AUTO: 3.75 X10(3) UL (ref 1.5–7.7)
NEUTROPHILS NFR BLD AUTO: 50.3 %
NONHDLC SERPL-MCNC: 61 MG/DL (ref ?–130)
OSMOLALITY SERPL CALC.SUM OF ELEC: 295 MOSM/KG (ref 275–295)
PATIENT FASTING Y/N/NP: YES
PATIENT FASTING Y/N/NP: YES
PLATELET # BLD AUTO: 165 10(3)UL (ref 150–450)
POTASSIUM SERPL-SCNC: 4.5 MMOL/L (ref 3.5–5.1)
PSA SERPL-MCNC: 1.02 NG/ML (ref ?–4)
RBC # BLD AUTO: 4.85 X10(6)UL (ref 3.8–5.8)
SODIUM SERPL-SCNC: 142 MMOL/L (ref 136–145)
TRIGL SERPL-MCNC: 73 MG/DL (ref 30–149)
TSI SER-ACNC: 1.6 MIU/ML (ref 0.36–3.74)
VLDLC SERPL CALC-MCNC: 15 MG/DL (ref 0–30)
WBC # BLD AUTO: 7.4 X10(3) UL (ref 4–11)

## 2020-07-02 PROCEDURE — 99397 PER PM REEVAL EST PAT 65+ YR: CPT | Performed by: INTERNAL MEDICINE

## 2020-07-02 PROCEDURE — 85025 COMPLETE CBC W/AUTO DIFF WBC: CPT

## 2020-07-02 PROCEDURE — 84443 ASSAY THYROID STIM HORMONE: CPT

## 2020-07-02 PROCEDURE — 84153 ASSAY OF PSA TOTAL: CPT

## 2020-07-02 PROCEDURE — 36415 COLL VENOUS BLD VENIPUNCTURE: CPT

## 2020-07-02 PROCEDURE — 80053 COMPREHEN METABOLIC PANEL: CPT

## 2020-07-02 PROCEDURE — 80061 LIPID PANEL: CPT

## 2020-07-02 PROCEDURE — 99213 OFFICE O/P EST LOW 20 MIN: CPT | Performed by: INTERNAL MEDICINE

## 2020-07-02 RX ORDER — ALBUTEROL SULFATE 90 UG/1
AEROSOL, METERED RESPIRATORY (INHALATION) EVERY 6 HOURS PRN
COMMUNITY

## 2020-07-02 RX ORDER — ATORVASTATIN CALCIUM 40 MG/1
40 TABLET, FILM COATED ORAL NIGHTLY
Qty: 90 TABLET | Refills: 1 | Status: SHIPPED | OUTPATIENT
Start: 2020-07-02 | End: 2020-12-24

## 2020-07-02 NOTE — PATIENT INSTRUCTIONS
Prevention Guidelines, Men Ages 72 and Older  Screening tests and vaccines are an important part of managing your health. A screening test is done to find possible disorders or diseases in people who don't have any symptoms.  The goal is to find a disease ea infection – talk with your healthcare provider At routine exams   High cholesterol or triglycerides All men in this age group At least every 5 years   HIV Men at increased risk for infection – talk with your healthcare provider At routine exams   Lung canc pneumococcal polysaccharide vaccine (PPSV23) All men in this age group 1 dose of each vaccine   Tetanus/diphtheria/  pertussis (Td/Tdap) booster All men in this age group Td every 10 years, or Tdap if you will have contact with a child younger than 13 carlitos

## 2020-07-02 NOTE — PROGRESS NOTES
Patient ID: Kendal Lynn is a 65year old male. Patient presents with:  Physical  Difficulty Swallowing: x1 month        HISTORY OF PRESENT ILLNESS:   HPI  Patient presents for above.   Here for annual physical and to discuss multiple medical problems.    HENT: Negative. Eyes: Negative. Respiratory: Negative. Cardiovascular: Negative. Gastrointestinal: Negative. Endocrine: Negative. Genitourinary: Negative. Musculoskeletal: Positive for arthralgias. Skin: Negative.     Allergic/Immun Probiotic Product (PROBIOTIC DAILY OR), Take by mouth., Disp: , Rfl:     Allergies:No Known Allergies    Social History    Socioeconomic History      Marital status:       Spouse name: Not on file      Number of children: Not on file      Years of e not use an assistive device. .        The patient does live in a home with stairs.           PHYSICAL EXAM:      07/02/20  1406   BP: 137/77   Pulse: 56   Temp: 98.8 °F (37.1 °C)   TempSrc: Temporal   Weight: 163 lb (73.9 kg)   Height: 5' 8\" (1.727 m) albuterol. · Follow-up with pulmonology. 5. Benign prostatic hyperplasia with nocturia  · PSA, DIAGNOSTIC; Future  · Follow-up with urology per their schedule. 6. Right hip pain  · XR HIP + PELVIS MIN 4 VIEWS RIGHT (CPT=73503);  Future  · Make sure t

## 2020-07-14 ENCOUNTER — OFFICE VISIT (OUTPATIENT)
Dept: PODIATRY CLINIC | Facility: CLINIC | Age: 70
End: 2020-07-14

## 2020-07-14 VITALS — HEIGHT: 68.5 IN | WEIGHT: 158 LBS | BODY MASS INDEX: 23.67 KG/M2

## 2020-07-14 DIAGNOSIS — M25.571 CHRONIC PAIN OF RIGHT ANKLE: Primary | ICD-10-CM

## 2020-07-14 DIAGNOSIS — G89.29 CHRONIC PAIN OF RIGHT ANKLE: Primary | ICD-10-CM

## 2020-07-14 PROCEDURE — 99203 OFFICE O/P NEW LOW 30 MIN: CPT | Performed by: PODIATRIST

## 2020-07-14 NOTE — PROGRESS NOTES
HPI:    Patient ID: Ary Acevedo is a 65year old male. 68-year-old male presents to me as a new patient on referral from [address]. Patient states that he is here because he has had ankle pain on the right side over an extended period of time.   He states motion of the ankle was without restriction and smooth. I was unable to create symptoms today. He states he is not had pain for 3 or 4 months but he wanted to talk about it just in case.   It almost sounds as though he may have had a gout attack with the

## 2020-12-05 ENCOUNTER — IMMUNIZATION (OUTPATIENT)
Dept: INTERNAL MEDICINE CLINIC | Facility: CLINIC | Age: 70
End: 2020-12-05

## 2020-12-05 DIAGNOSIS — Z23 NEED FOR VACCINATION: ICD-10-CM

## 2020-12-05 PROCEDURE — 90471 IMMUNIZATION ADMIN: CPT | Performed by: NURSE PRACTITIONER

## 2020-12-05 PROCEDURE — 90662 IIV NO PRSV INCREASED AG IM: CPT | Performed by: NURSE PRACTITIONER

## 2020-12-15 RX ORDER — BUDESONIDE AND FORMOTEROL FUMARATE DIHYDRATE 160; 4.5 UG/1; UG/1
2 AEROSOL RESPIRATORY (INHALATION) 2 TIMES DAILY
Qty: 3 INHALER | Refills: 3 | Status: SHIPPED | OUTPATIENT
Start: 2020-12-15 | End: 2021-06-30

## 2020-12-15 NOTE — TELEPHONE ENCOUNTER
Called cell #--VM full.  Left message to call back to relay Dr. Ruthie Harman refilled Symbicort as requested--autoMyChart message sent to patient when PCP refilled medication

## 2020-12-24 DIAGNOSIS — E78.00 HYPERCHOLESTEROLEMIA: ICD-10-CM

## 2020-12-24 RX ORDER — ATORVASTATIN CALCIUM 40 MG/1
TABLET, FILM COATED ORAL
Qty: 90 TABLET | Refills: 0 | Status: SHIPPED | OUTPATIENT
Start: 2020-12-24 | End: 2021-03-22

## 2021-01-06 ENCOUNTER — TELEMEDICINE (OUTPATIENT)
Dept: INTERNAL MEDICINE CLINIC | Facility: CLINIC | Age: 71
End: 2021-01-06

## 2021-01-06 DIAGNOSIS — E78.00 HYPERCHOLESTEROLEMIA: Primary | ICD-10-CM

## 2021-01-06 DIAGNOSIS — J43.2 CENTRILOBULAR EMPHYSEMA (HCC): ICD-10-CM

## 2021-01-06 DIAGNOSIS — H90.3 SENSORINEURAL HEARING LOSS, BILATERAL: ICD-10-CM

## 2021-01-06 PROCEDURE — 99214 OFFICE O/P EST MOD 30 MIN: CPT | Performed by: INTERNAL MEDICINE

## 2021-01-06 NOTE — PROGRESS NOTES
Patient ID: Felipe  is a 65year old male. Patient presents with: Follow - Up       HISTORY OF PRESENT ILLNESS:   Patient presents for above. This visit is conducted using Telemedicine with live, interactive video and audio (D[employer]ity).   Patient with exertion   • Tubular adenoma of colon 2019    repeat CLN in 5 yrs       Past Surgical History:   Procedure Laterality Date   • ANGIOGRAM      Was told he had a blockage    • COLONOSCOPY      [facility] about 5 yrs ago   • COLONOSCOPY N/A 8/20/2019    Perfor status: Never Smoker      Smokeless tobacco: Never Used    Substance and Sexual Activity      Alcohol use: No      Drug use: No      Sexual activity: Not on file    Lifestyle      Physical activity        Days per week: Not on file        Minutes per sessi needed. 3. Sensorineural hearing loss, bilateral  · Stable. · Patient does not wish for further evaluation at this time.     Return in about 6 months (around 7/6/2021) for Complete physical.    Time spent on encounter  20 minutes   Video time 15 minutes Coding/billing information is submitted for this visit based on complexity of care and/or time spent for the visit.     Chani Ott MD  1/6/2021

## 2021-03-04 DIAGNOSIS — Z23 NEED FOR VACCINATION: ICD-10-CM

## 2021-03-22 ENCOUNTER — OFFICE VISIT (OUTPATIENT)
Dept: INTERNAL MEDICINE CLINIC | Facility: CLINIC | Age: 71
End: 2021-03-22

## 2021-03-22 VITALS
DIASTOLIC BLOOD PRESSURE: 73 MMHG | BODY MASS INDEX: 24.87 KG/M2 | HEIGHT: 68.5 IN | WEIGHT: 166 LBS | SYSTOLIC BLOOD PRESSURE: 119 MMHG | HEART RATE: 71 BPM

## 2021-03-22 DIAGNOSIS — H61.23 BILATERAL IMPACTED CERUMEN: ICD-10-CM

## 2021-03-22 DIAGNOSIS — H35.3130 BILATERAL NONEXUDATIVE AGE-RELATED MACULAR DEGENERATION, UNSPECIFIED STAGE: ICD-10-CM

## 2021-03-22 DIAGNOSIS — E78.00 HYPERCHOLESTEROLEMIA: ICD-10-CM

## 2021-03-22 DIAGNOSIS — R13.10 DYSPHAGIA, UNSPECIFIED TYPE: Primary | ICD-10-CM

## 2021-03-22 DIAGNOSIS — R47.9 DIFFICULTY SPEAKING: ICD-10-CM

## 2021-03-22 PROCEDURE — 3008F BODY MASS INDEX DOCD: CPT | Performed by: INTERNAL MEDICINE

## 2021-03-22 PROCEDURE — 99214 OFFICE O/P EST MOD 30 MIN: CPT | Performed by: INTERNAL MEDICINE

## 2021-03-22 PROCEDURE — 3074F SYST BP LT 130 MM HG: CPT | Performed by: INTERNAL MEDICINE

## 2021-03-22 PROCEDURE — 3078F DIAST BP <80 MM HG: CPT | Performed by: INTERNAL MEDICINE

## 2021-03-22 RX ORDER — ATORVASTATIN CALCIUM 40 MG/1
40 TABLET, FILM COATED ORAL NIGHTLY
Qty: 90 TABLET | Refills: 0 | Status: SHIPPED | OUTPATIENT
Start: 2021-03-22 | End: 2021-07-20

## 2021-03-22 NOTE — PROGRESS NOTES
Patient ID: Damian Mcclellan is a 65year old male. Patient presents with:  Referral: Pt presents to office for referral due to throat discomfort/pain to speak, swallow, and other activities x6 months.         HISTORY OF PRESENT ILLNESS:   HPI  Patient present of colon 2019    repeat CLN in 5 yrs       Past Surgical History:   Procedure Laterality Date   • ANGIOGRAM      Was told he had a blockage    • COLONOSCOPY      [facility] about 5 yrs ago   • COLONOSCOPY N/A 8/20/2019    Performed by Stuart So MD at Service: Not Asked        Blood Transfusions: Not Asked        Caffeine Concern: No        Occupational Exposure: Not Asked        Hobby Hazards: Not Asked        Sleep Concern: Not Asked        Stress Concern: Not Asked        Weight Concern: Not Asked Cardiovascular:      Rate and Rhythm: Normal rate and regular rhythm. Pulses: Normal pulses. Heart sounds: No murmur heard. No friction rub. No gallop. Pulmonary:      Effort: Pulmonary effort is normal. No respiratory distress.       Breat

## 2021-03-25 ENCOUNTER — OFFICE VISIT (OUTPATIENT)
Dept: OTOLARYNGOLOGY | Facility: CLINIC | Age: 71
End: 2021-03-25

## 2021-03-25 VITALS
HEIGHT: 68 IN | BODY MASS INDEX: 24.4 KG/M2 | SYSTOLIC BLOOD PRESSURE: 122 MMHG | DIASTOLIC BLOOD PRESSURE: 76 MMHG | WEIGHT: 161 LBS

## 2021-03-25 DIAGNOSIS — R49.0 HOARSENESS: Primary | ICD-10-CM

## 2021-03-25 PROCEDURE — 99213 OFFICE O/P EST LOW 20 MIN: CPT | Performed by: OTOLARYNGOLOGY

## 2021-03-25 PROCEDURE — 3008F BODY MASS INDEX DOCD: CPT | Performed by: OTOLARYNGOLOGY

## 2021-03-25 PROCEDURE — 3074F SYST BP LT 130 MM HG: CPT | Performed by: OTOLARYNGOLOGY

## 2021-03-25 PROCEDURE — 3078F DIAST BP <80 MM HG: CPT | Performed by: OTOLARYNGOLOGY

## 2021-03-25 PROCEDURE — 31575 DIAGNOSTIC LARYNGOSCOPY: CPT | Performed by: OTOLARYNGOLOGY

## 2021-03-25 RX ORDER — OMEPRAZOLE 40 MG/1
40 CAPSULE, DELAYED RELEASE ORAL DAILY
Qty: 30 CAPSULE | Refills: 2 | Status: SHIPPED | OUTPATIENT
Start: 2021-03-25 | End: 2021-10-05

## 2021-03-26 NOTE — PROGRESS NOTES
[place] is a 65year old male.  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 ,is using an inhaler ,not helping   Cerdonovan Guevara Glaucoma suspect of both eyes 6/24/2019   • Hearing difficulty    • High cholesterol    • Macular degeneration    • Shortness of breath     with exertion   • Tubular adenoma of colon 2019    repeat CLN in 5 yrs      Social History:  Social History    [place] Procedures:  Endoscopy/Laryngoscopy  Pre-Procedure Care: Verbal consent was obtained. Procedure/risks were explained. Questions were answered. Correct patient identified. Correct side and site confirmed.       A topical spray of 0.25% Neosynephrine was

## 2021-04-12 ENCOUNTER — APPOINTMENT (OUTPATIENT)
Dept: SPEECH THERAPY | Age: 71
End: 2021-04-12
Attending: OTOLARYNGOLOGY
Payer: COMMERCIAL

## 2021-04-14 ENCOUNTER — TELEPHONE (OUTPATIENT)
Dept: PHYSICAL THERAPY | Facility: HOSPITAL | Age: 71
End: 2021-04-14

## 2021-04-14 ENCOUNTER — TELEPHONE (OUTPATIENT)
Dept: OTOLARYNGOLOGY | Facility: CLINIC | Age: 71
End: 2021-04-14

## 2021-04-14 DIAGNOSIS — J38.2 VOCAL CORD NODULES: Primary | ICD-10-CM

## 2021-04-14 NOTE — TELEPHONE ENCOUNTER
Please see below. Insurance not covering speech therapy for current diagnosis. LOV 3/25/21 for Hoarseness. Please advise if there is an alternative dx that we could try re-entering the order with?  Per LOV note it seems he had some irritation and ulceration

## 2021-04-14 NOTE — TELEPHONE ENCOUNTER
Ins will not cover dx code R49.0 for hoarseness - pt is scheduled for PT on 4/16 - pls advise Under Sterile [employer]ique reservoir accessed with 23 gauge needle. 5 cc of CSF removed, OP -1 to 0. CSF sent for studies. Pt tolerated the procedure well.

## 2021-04-14 NOTE — TELEPHONE ENCOUNTER
Received call back from BLANCHE Aguilar 98 with PT. She states she was given incorrect insurance info previously. Current referral is covered under patient's HMO plan. No need to change order at this time.

## 2021-04-14 NOTE — TELEPHONE ENCOUNTER
ESTUARDOLYNDSEY for [facility] with outpatient PT to discuss. I do see that the previous referral was authorized by managed care with the patient's insurance. Otherwise can enter new order with Dx below.

## 2021-04-16 ENCOUNTER — OFFICE VISIT (OUTPATIENT)
Dept: SPEECH THERAPY | Facility: HOSPITAL | Age: 71
End: 2021-04-16
Attending: OTOLARYNGOLOGY
Payer: COMMERCIAL

## 2021-04-16 DIAGNOSIS — R49.0 HOARSENESS: ICD-10-CM

## 2021-04-16 PROCEDURE — 92524 BEHAVRAL QUALIT ANALYS VOICE: CPT

## 2021-04-16 NOTE — PROGRESS NOTES
ADULT VOICE EVALUATION:   Referring Physician: Dr. Jesús Soria V  Diagnosis: vocal cord nodules    Date of Service: 4/16/2021     PATIENT SUMMARY   Felipe Ibrahim is a 65year old y/o male who presents to therapy today with complaints of change in voice and elevat Medical History  Past Medical History:   Diagnosis Date   • Acid reflux    • Age-related nuclear cataract of both eyes 6/24/2019   • CAD (coronary artery disease)    • COPD (chronic obstructive pulmonary disease) (HCC)    • Decreased lung capacity    • Dev Consistent and Score: 16/100      VOCAL QUALITY:  Mildly harsh, strained. Perturbation measures were assessed for sustained vowels were intermittently above normal limits:  Jitter: 0.62-1.0%  Normal limits for jitter is less than 1.04%.   Shimmer 1.45-4.18 vocal relaxation exercises daily with 90% accuracy with minimal assistance. The patient will utilize diaphragmatic breathing in conversation with 90% accuracy.     The patient will use vocal strategies in words, sentences, rote speech tasks and then conv

## 2021-04-16 NOTE — PATIENT INSTRUCTIONS
VOCAL HYGIENE PROGRAM     Establishing Good Vocal Health    The following suggestions can be implemented to prevent vocal fold pathology, eliminate vocal fold pathology, and to eliminate vocal qualities, which are distracting to the listener.     Elio Shanks respiratory    infection    Other general suggestions:   1. Avoid smoking and smoky environments   2. Wear a mask with airborne irritants (mowing lawn, heavy cleaning, etc.)   3.  Be aware of side effects of both prescription and over the counter    medicat

## 2021-04-19 ENCOUNTER — APPOINTMENT (OUTPATIENT)
Dept: SPEECH THERAPY | Age: 71
End: 2021-04-19
Attending: OTOLARYNGOLOGY
Payer: COMMERCIAL

## 2021-04-23 ENCOUNTER — OFFICE VISIT (OUTPATIENT)
Dept: SPEECH THERAPY | Facility: HOSPITAL | Age: 71
End: 2021-04-23
Attending: OTOLARYNGOLOGY
Payer: COMMERCIAL

## 2021-04-23 DIAGNOSIS — R49.0 HOARSENESS: ICD-10-CM

## 2021-04-23 PROCEDURE — 92507 TX SP LANG VOICE COMM INDIV: CPT

## 2021-04-26 ENCOUNTER — APPOINTMENT (OUTPATIENT)
Dept: SPEECH THERAPY | Age: 71
End: 2021-04-26
Attending: OTOLARYNGOLOGY
Payer: COMMERCIAL

## 2021-04-29 ENCOUNTER — OFFICE VISIT (OUTPATIENT)
Dept: SPEECH THERAPY | Facility: HOSPITAL | Age: 71
End: 2021-04-29
Attending: OTOLARYNGOLOGY
Payer: COMMERCIAL

## 2021-04-29 DIAGNOSIS — R49.0 HOARSENESS: ICD-10-CM

## 2021-04-29 PROCEDURE — 92507 TX SP LANG VOICE COMM INDIV: CPT

## 2021-04-29 NOTE — PROGRESS NOTES
Diagnosis: vocal cord nodules  Authorized # of Visits:  6         Next MD visit: none scheduled  Fall Risk: standard         Precautions: covid PPE             Subjective: Pt realized he clears his throat frequently.   He can feel the nodules when he drinks patient was more aware of clearing his throat and he realized he was clearing his throat frequently. Discussed how this could exacerbate the vocal nodules. Pt able to use increased airflow, but did not generally understand facial focus.   He was able to u

## 2021-05-03 ENCOUNTER — APPOINTMENT (OUTPATIENT)
Dept: SPEECH THERAPY | Age: 71
End: 2021-05-03
Attending: OTOLARYNGOLOGY
Payer: COMMERCIAL

## 2021-05-07 ENCOUNTER — OFFICE VISIT (OUTPATIENT)
Dept: SPEECH THERAPY | Facility: HOSPITAL | Age: 71
End: 2021-05-07
Attending: OTOLARYNGOLOGY
Payer: COMMERCIAL

## 2021-05-07 DIAGNOSIS — R49.0 HOARSENESS: ICD-10-CM

## 2021-05-07 PROCEDURE — 92507 TX SP LANG VOICE COMM INDIV: CPT

## 2021-05-07 NOTE — PROGRESS NOTES
Diagnosis: vocal cord nodules  Authorized # of Visits:  6         Next MD visit: none scheduled  Fall Risk: standard         Precautions: covid PPE             Subjective: Pt reports no significant changes in his voice.   He has not attempted to sing lately 75%  Increased length of sentences-discussed but not completed. Strategies in conversation  No attempt at using in conversation yet Not yet attempted, however, voice is ~80% clear without harshness, strain or glottal marlow.       Strategies in singing Services: voice therapy    Charges: 74211        Total Treatment Time: 50 min    Kiarra Parekh MA/DAMIEN-SLP  Speech Language Pathologist  [facility]  [phone]

## 2021-05-10 ENCOUNTER — APPOINTMENT (OUTPATIENT)
Dept: SPEECH THERAPY | Age: 71
End: 2021-05-10
Attending: OTOLARYNGOLOGY
Payer: COMMERCIAL

## 2021-05-13 ENCOUNTER — OFFICE VISIT (OUTPATIENT)
Dept: OPHTHALMOLOGY | Facility: CLINIC | Age: 71
End: 2021-05-13

## 2021-05-13 DIAGNOSIS — H40.003 GLAUCOMA SUSPECT OF BOTH EYES: Primary | ICD-10-CM

## 2021-05-13 DIAGNOSIS — H43.391 FLOATERS, RIGHT: ICD-10-CM

## 2021-05-13 DIAGNOSIS — H25.13 AGE-RELATED NUCLEAR CATARACT OF BOTH EYES: ICD-10-CM

## 2021-05-13 PROCEDURE — 92250 FUNDUS PHOTOGRAPHY W/I&R: CPT | Performed by: OPHTHALMOLOGY

## 2021-05-13 PROCEDURE — 92015 DETERMINE REFRACTIVE STATE: CPT | Performed by: OPHTHALMOLOGY

## 2021-05-13 PROCEDURE — 92014 COMPRE OPH EXAM EST PT 1/>: CPT | Performed by: OPHTHALMOLOGY

## 2021-05-13 NOTE — ASSESSMENT & PLAN NOTE
Discussed mild cataracts in both eyes that are not affecting vision and are not surgical at this time. New glasses Rx given. Suggest updated.

## 2021-05-13 NOTE — PROGRESS NOTES
Eric Vizcarra is a 66year old male. HPI:     HPI     Consult      Additional comments: Consult per Dr. Solange Lyons              Comments     Patient is here for a complete exam and photos. Patient states his vision is good. He has no ocular complaints.   He w MCG/ACT Inhalation Aerosol Inhale 2 puffs into the lungs 2 (two) times daily. 3 Inhaler 3   • Calcium Carbonate-Vitamin D (CALTRATE 600+D OR) Take by mouth.      • Albuterol Sulfate  (90 Base) MCG/ACT Inhalation Aero Soln Inhale into the lungs every Clear, no spindle, 2+ arcus  Clear, no spindle, 2+ arcus     Anterior Chamber Deep and quiet Deep and quiet    Iris Normal, No transillumination defects Normal, No transillumination defects    Lens 1+ Nuclear sclerosis, Trace Cortical cataract inferiorly right  There is no evidence of retinal pathology. All signs and symptoms of retinal detachment/tears explained in detail.     Patient instructed to call the office if they experience increase in floaters, increase in flashes of light, loss of vision or cur

## 2021-05-14 ENCOUNTER — OFFICE VISIT (OUTPATIENT)
Dept: SPEECH THERAPY | Facility: HOSPITAL | Age: 71
End: 2021-05-14
Attending: OTOLARYNGOLOGY
Payer: COMMERCIAL

## 2021-05-14 DIAGNOSIS — R49.0 HOARSENESS: ICD-10-CM

## 2021-05-14 PROCEDURE — 92507 TX SP LANG VOICE COMM INDIV: CPT

## 2021-05-14 NOTE — PROGRESS NOTES
Diagnosis: vocal cord nodules  Authorized # of Visits:  6         Next MD visit: none scheduled  Fall Risk: standard         Precautions: covid PPE             Subjective: Pt reports no significant changes in his voice. He has started to sing a little.   H but after cues was able to produce 1-2 syllable words with 100% accuracy 1 syll nasals 90%  2 syll 90%  Phrases 80%  Sentences 70%   Words 90%  Phrases 100%  Sentences 60%  Sentence completion 75%  Increased length of sentences-discussed but not completed. to less than 10%. The patient will reduce conversation loudness and oral reading to less than 75dB on average. Plan: Continue treatment per specified goals.   Pt should begin singing and completing vocal glides with strategies to improve range and use

## 2021-05-17 ENCOUNTER — APPOINTMENT (OUTPATIENT)
Dept: SPEECH THERAPY | Age: 71
End: 2021-05-17
Attending: OTOLARYNGOLOGY
Payer: COMMERCIAL

## 2021-05-20 ENCOUNTER — APPOINTMENT (OUTPATIENT)
Dept: SPEECH THERAPY | Facility: HOSPITAL | Age: 71
End: 2021-05-20
Attending: OTOLARYNGOLOGY
Payer: COMMERCIAL

## 2021-05-20 ENCOUNTER — TELEPHONE (OUTPATIENT)
Dept: SPEECH THERAPY | Facility: HOSPITAL | Age: 71
End: 2021-05-20

## 2021-05-20 NOTE — TELEPHONE ENCOUNTER
Called and LM for patient as he NS for his voice therapy session today. I reminded him of his next session.   Brittni Myles MA/CCC-SLP  Speech Language Pathologist  [facility] [place]  [phone]

## 2021-05-22 ENCOUNTER — NURSE ONLY (OUTPATIENT)
Dept: OPHTHALMOLOGY | Facility: CLINIC | Age: 71
End: 2021-05-22

## 2021-05-22 DIAGNOSIS — H40.003 GLAUCOMA SUSPECT OF BOTH EYES: ICD-10-CM

## 2021-05-22 PROCEDURE — 92083 EXTENDED VISUAL FIELD XM: CPT | Performed by: OPHTHALMOLOGY

## 2021-05-22 PROCEDURE — 92133 CPTRZD OPH DX IMG PST SGM ON: CPT | Performed by: OPHTHALMOLOGY

## 2021-05-24 ENCOUNTER — APPOINTMENT (OUTPATIENT)
Dept: SPEECH THERAPY | Age: 71
End: 2021-05-24
Attending: OTOLARYNGOLOGY
Payer: COMMERCIAL

## 2021-05-24 NOTE — ASSESSMENT & PLAN NOTE
Normal visual field, both eyes. Abnormal OCT, both eyes. Begin Latanoprost, 1 drop, both eyes, at bedtime.

## 2021-05-24 NOTE — PROGRESS NOTES
Izaiah Kaur is a 66year old male. HPI:     HPI     Patient is here for a glaucoma work up with HVF and OCT, john FLOWERS     Last edited by Jn Hogue on 5/22/2021  9:20 AM. (History)        Patient History:  Past Medical History:   Diagnosis Date   • Ac 108 (90 Base) MCG/ACT Inhalation Aero Soln Inhale into the lungs every 6 (six) hours as needed for Wheezing. • Budesonide-Formoterol Fumarate (SYMBICORT IN) Inhale into the lungs.      • Ascorbic Acid (VITAMIN C) 1000 MG Oral Tab Take 1,000 mg by mouth

## 2021-05-25 RX ORDER — LATANOPROST 50 UG/ML
1 SOLUTION/ DROPS OPHTHALMIC NIGHTLY
Qty: 7.5 ML | Refills: 3 | Status: SHIPPED | OUTPATIENT
Start: 2021-05-25

## 2021-05-25 NOTE — TELEPHONE ENCOUNTER
ESTUARDO for pt to call me back to. ... Discussed potential side effects of Latanoprost drops such as color changes of the iris, mild redness of the eyes, hyperpigmentation of skin around the eyes and eyelash lengthening.   Patient should always gently wipe away

## 2021-05-28 ENCOUNTER — APPOINTMENT (OUTPATIENT)
Dept: SPEECH THERAPY | Facility: HOSPITAL | Age: 71
End: 2021-05-28
Attending: OTOLARYNGOLOGY
Payer: COMMERCIAL

## 2021-06-07 ENCOUNTER — APPOINTMENT (OUTPATIENT)
Dept: SPEECH THERAPY | Age: 71
End: 2021-06-07
Attending: OTOLARYNGOLOGY
Payer: COMMERCIAL

## 2021-06-11 ENCOUNTER — APPOINTMENT (OUTPATIENT)
Dept: SPEECH THERAPY | Facility: HOSPITAL | Age: 71
End: 2021-06-11
Attending: OTOLARYNGOLOGY
Payer: COMMERCIAL

## 2021-06-30 ENCOUNTER — TELEMEDICINE (OUTPATIENT)
Dept: INTERNAL MEDICINE CLINIC | Facility: CLINIC | Age: 71
End: 2021-06-30

## 2021-06-30 DIAGNOSIS — J43.2 CENTRILOBULAR EMPHYSEMA (HCC): Primary | ICD-10-CM

## 2021-06-30 DIAGNOSIS — R49.9 CHANGE IN VOICE: ICD-10-CM

## 2021-06-30 PROCEDURE — 99214 OFFICE O/P EST MOD 30 MIN: CPT | Performed by: INTERNAL MEDICINE

## 2021-06-30 RX ORDER — MONTELUKAST SODIUM 10 MG/1
10 TABLET ORAL NIGHTLY
Qty: 90 TABLET | Refills: 0 | Status: SHIPPED | OUTPATIENT
Start: 2021-06-30 | End: 2021-10-02

## 2021-06-30 NOTE — PROGRESS NOTES
Patient ID: Louisa Husbands is a 66year old male. Patient presents with: Follow - Up         HISTORY OF PRESENT ILLNESS:   Patient presents for above. This visit is conducted using Telemedicine with live, interactive video and audio.     History of emphysem •  Montelukast Sodium 10 MG Oral Tab, Take 1 tablet (10 mg total) by mouth nightly., Disp: 90 tablet, Rfl: 0  •  latanoprost 0.005 % Ophthalmic Solution, Place 1 drop into both eyes nightly.  Instill 1 drop by ophthalmic route every night into both eyes, Concern: Not Asked        Weight Concern: Not Asked        Special Diet: Not Asked        Back Care: Not Asked        Exercise: No        Bike Helmet: Not Asked        Seat Belt: Not Asked        Self-Exams: Not Asked    Social History Narrative      The p video evaluation is not a substitute for face-to-face examination or emergency care. Patient advised to go to ER or call 911 for worsening symptoms or acute distress.      Telehealth outside of [email or web address] Consent   I conducted a telehealth v correct errors during dictation discrepancies may still exist.    Andreea Alvarado MD  6/30/2021

## 2021-07-02 ENCOUNTER — TELEPHONE (OUTPATIENT)
Dept: CASE MANAGEMENT | Age: 71
End: 2021-07-02

## 2021-07-02 NOTE — TELEPHONE ENCOUNTER
Dr. Marcio Minaya is out of network. Please redirect pt to a in network provider or provide medical necessity. The referral will be closed until medical necessity is provided. Or please place a new referral with a in network provider.

## 2021-07-20 DIAGNOSIS — E78.00 HYPERCHOLESTEROLEMIA: ICD-10-CM

## 2021-07-20 RX ORDER — ATORVASTATIN CALCIUM 40 MG/1
TABLET, FILM COATED ORAL
Qty: 90 TABLET | Refills: 0 | Status: SHIPPED | OUTPATIENT
Start: 2021-07-20 | End: 2021-10-19

## 2021-07-22 LAB — AMB EXT COVID-19 RESULT: DETECTED

## 2021-07-23 ENCOUNTER — HOSPITAL ENCOUNTER (EMERGENCY)
Facility: HOSPITAL | Age: 71
Discharge: HOME | End: 2021-07-23
Attending: EMERGENCY MEDICINE
Payer: COMMERCIAL

## 2021-07-23 ENCOUNTER — APPOINTMENT (OUTPATIENT)
Dept: GENERAL RADIOLOGY | Facility: HOSPITAL | Age: 71
End: 2021-07-23
Attending: EMERGENCY MEDICINE
Payer: COMMERCIAL

## 2021-07-23 ENCOUNTER — TELEMEDICINE (OUTPATIENT)
Dept: INTERNAL MEDICINE CLINIC | Facility: CLINIC | Age: 71
End: 2021-07-23

## 2021-07-23 ENCOUNTER — NURSE TRIAGE (OUTPATIENT)
Dept: INTERNAL MEDICINE CLINIC | Facility: CLINIC | Age: 71
End: 2021-07-23

## 2021-07-23 VITALS
BODY MASS INDEX: 23.85 KG/M2 | HEART RATE: 77 BPM | RESPIRATION RATE: 22 BRPM | OXYGEN SATURATION: 98 % | SYSTOLIC BLOOD PRESSURE: 146 MMHG | DIASTOLIC BLOOD PRESSURE: 82 MMHG | WEIGHT: 161 LBS | HEIGHT: 69 IN | TEMPERATURE: 98 F

## 2021-07-23 DIAGNOSIS — J45.21 MILD INTERMITTENT ASTHMA WITH EXACERBATION: Primary | ICD-10-CM

## 2021-07-23 DIAGNOSIS — U07.1 COVID-19 VIRUS INFECTION: ICD-10-CM

## 2021-07-23 DIAGNOSIS — U07.1 COVID-19: Primary | ICD-10-CM

## 2021-07-23 PROCEDURE — 99214 OFFICE O/P EST MOD 30 MIN: CPT | Performed by: INTERNAL MEDICINE

## 2021-07-23 PROCEDURE — 99453 REM MNTR PHYSIOL PARAM SETUP: CPT

## 2021-07-23 PROCEDURE — 99285 EMERGENCY DEPT VISIT HI MDM: CPT

## 2021-07-23 PROCEDURE — 71045 X-RAY EXAM CHEST 1 VIEW: CPT | Performed by: EMERGENCY MEDICINE

## 2021-07-23 NOTE — ED PROVIDER NOTES
Patient Seen in: [facility] AND [facility] Emergency Department      History   Patient presents with:  Cough/URI    Stated Complaint: rhinorrhea    HPI/Subjective:   HPI  Patient is a 75-year-old male history of COPD, CAD, hyperlipidemia presenting with known CO above.    Physical Exam     ED Triage Vitals [07/23/21 1424]   /85   Pulse 92   Resp 18   Temp 98.4 °F (36.9 °C)   Temp src    SpO2 95 %   O2 Device None (Room air)       Current:/85   Pulse 92   Temp 98.4 °F (36.9 °C)   Resp 18   Ht 175.3 cm ( 4:54 PM                        MDM      Patient is 15-year-old male history of COPD, CAD, hyperlipidemia presenting with fever, rhinorrhea and fatigue x3 days, tested positive for Covid yesterday. Here for antibody infusion. Arrived in no acute distress. within the next three months to obtain basic health screening including reassessment of your blood pressure. The patient has evidence of COVID-19 pneumonia and low oxygen saturation. There is concern for gradual decline at home.  As a result, a pulse oxime

## 2021-07-23 NOTE — ED INITIAL ASSESSMENT (HPI)
Patient aox3 to ed via private  Vehicle patient tested positive for covid 19 x 3 days ago, sent by pcp for antibody infusion

## 2021-07-23 NOTE — TELEPHONE ENCOUNTER
Patient placed on home monitoring. Video appt made today with Dr. Dada Stevenson. covid positive 7/22/21. Symptoms started 3 days ago. He c/o Mild fever, bodyaches, dry mouth, fatigue. What  was your temp today? - 99.2    How did you take your temp?

## 2021-07-23 NOTE — PROGRESS NOTES
Virtual Telephone Check-In    Garthoscar  verbally consents to a Virtual/Telephone Check-In visit on 07/26/21. Patient has been referred to the [facility] website at [email or web address] to review the yearly Consent to Treat document.     Patient understands Gastrointestinal: Positive for diarrhea. Negative for abdominal pain, blood in stool, constipation, nausea and vomiting. Musculoskeletal: Positive for myalgias.          Current Outpatient Medications   Medication Sig Dispense Refill   • ATORVASTATIN 40 Was told he had a blockage    • COLONOSCOPY      [facility] about 5 yrs ago   • COLONOSCOPY N/A 8/20/2019    Procedure: COLONOSCOPY;  Surgeon: Niles Rangel MD;  Location: [address] [place] [address] ENDOSCOPY   • EGD     • OTHER SURGICAL HISTORY      nasal surgery      Family

## 2021-07-26 ENCOUNTER — TELEPHONE (OUTPATIENT)
Dept: CASE MANAGEMENT | Age: 71
End: 2021-07-26

## 2021-07-26 NOTE — TELEPHONE ENCOUNTER
Day 4/10, covid positive 7/22/21, MAB 7/23/21    What  was your temp today? - afebrile    How did you take your temp?     without a thermometer    What was your pulse ox today? 99  No   Are you feeling short of breath today?        Is the shortness of trevin

## 2021-07-26 NOTE — TELEPHONE ENCOUNTER
Pt received PAB infusion at ED on 7/23 for COVID-19. Please follow-up with pt for post-infusion assessment and home monitoring. Thank you.

## 2021-07-27 ENCOUNTER — TELEMEDICINE (OUTPATIENT)
Dept: INTERNAL MEDICINE CLINIC | Facility: CLINIC | Age: 71
End: 2021-07-27

## 2021-07-27 DIAGNOSIS — U07.1 COVID-19: Primary | ICD-10-CM

## 2021-07-27 PROCEDURE — 99213 OFFICE O/P EST LOW 20 MIN: CPT | Performed by: INTERNAL MEDICINE

## 2021-07-27 NOTE — PROGRESS NOTES
Patient ID: Liz Jackson is a 66year old male. Patient presents with:  Covid         HISTORY OF PRESENT ILLNESS:   Patient presents for above. This visit is conducted using Telemedicine with live, interactive video and audio.   Patient diagnosed with COVI 90 tablet, Rfl: 0  •  Montelukast Sodium 10 MG Oral Tab, Take 1 tablet (10 mg total) by mouth nightly., Disp: 90 tablet, Rfl: 0  •  latanoprost 0.005 % Ophthalmic Solution, Place 1 drop into both eyes nightly.  Instill 1 drop by ophthalmic route every night Back Care: Not Asked        Exercise: No        Bike Helmet: Not Asked        Seat Belt: Not Asked        Self-Exams: Not Asked    Social History Narrative      The patient does not use an assistive device. .        The patient does live in a home with stai was completed using two-way, real-time interactive audio and video communication.  This has been done in good amanda to provide continuity of care in the best interest of the provider-patient relationship, due to the COVID -19 public health crisis/national e

## 2021-07-27 NOTE — TELEPHONE ENCOUNTER
What  was your temp today? - 95.1    How did you take your temp?     with an oral thermometer    What was your pulse ox today?  99%    Are you feeling short of breath today? No      Is the shortness of breath better, the same, or worse than yesterday?

## 2021-07-28 NOTE — TELEPHONE ENCOUNTER
Post-ER Virtual Visit Completed yesterday 7/27/21. Per Dr. Jose Rowe = Quarantine for 10 days from date of positive test (COVID positive 7/22/21).      What  was your temp today? - 96.7    How did you take your temp?     with an oral thermometer    What was y

## 2021-07-30 NOTE — TELEPHONE ENCOUNTER
What  was your temp today? - 97.7    How did you take your temp?     with an oral thermometer    What was your pulse ox today?  99%      Are you feeling short of breath today? No      Is the shortness of breath better, the same, or worse than yesterday?

## 2021-07-31 NOTE — TELEPHONE ENCOUNTER
Dr Denis Fontaine, patient feels well with the exception of partial loss of taste/smell. He will be 10 days post PAB on 8/2/21, would you like another telehealth visit or can the home monitoring be discontinued?     Day 8 after ER visit and PAB 7/22, Covid+ 7/22, te

## 2021-09-14 ENCOUNTER — TELEPHONE (OUTPATIENT)
Dept: INTERNAL MEDICINE CLINIC | Facility: CLINIC | Age: 71
End: 2021-09-14

## 2021-09-14 NOTE — TELEPHONE ENCOUNTER
Pt spouse calling Requesting letter for pt stating that he had COVID but recovered and is cleared. Pt spouse also asking if they should have booster shots as well. Requesting call back. They are leaving for out of country this Friday and would like answer prior to if possible.

## 2021-09-14 NOTE — TELEPHONE ENCOUNTER
Letter written and uploaded to his chart. He should wait to get the Covid booster shot 3 months after justine COVID-19. I do recommend that he still gets it.

## 2021-09-23 ENCOUNTER — TELEPHONE (OUTPATIENT)
Dept: OPHTHALMOLOGY | Facility: CLINIC | Age: 71
End: 2021-09-23

## 2021-10-01 DIAGNOSIS — J43.2 CENTRILOBULAR EMPHYSEMA (HCC): ICD-10-CM

## 2021-10-02 RX ORDER — MONTELUKAST SODIUM 10 MG/1
10 TABLET ORAL NIGHTLY
Qty: 90 TABLET | Refills: 0 | Status: SHIPPED | OUTPATIENT
Start: 2021-10-02 | End: 2021-12-30

## 2021-10-02 NOTE — TELEPHONE ENCOUNTER
Refill passed per [facility] protocol.     Requested Prescriptions   Pending Prescriptions Disp Refills    MONTELUKAST 10 MG Oral Tab [Pharmacy Med Name: Montelukast Sodium Oral Tablet 10 MG] 90 tablet 0     Sig: Take 1 tablet (10 mg total) by mouth ni

## 2021-10-05 ENCOUNTER — MED REC SCAN ONLY (OUTPATIENT)
Dept: INTERNAL MEDICINE CLINIC | Facility: CLINIC | Age: 71
End: 2021-10-05

## 2021-10-05 ENCOUNTER — LAB ENCOUNTER (OUTPATIENT)
Dept: LAB | Age: 71
End: 2021-10-05
Attending: INTERNAL MEDICINE
Payer: COMMERCIAL

## 2021-10-05 ENCOUNTER — OFFICE VISIT (OUTPATIENT)
Dept: INTERNAL MEDICINE CLINIC | Facility: CLINIC | Age: 71
End: 2021-10-05

## 2021-10-05 VITALS
HEIGHT: 69 IN | BODY MASS INDEX: 25.18 KG/M2 | DIASTOLIC BLOOD PRESSURE: 98 MMHG | RESPIRATION RATE: 16 BRPM | SYSTOLIC BLOOD PRESSURE: 155 MMHG | HEART RATE: 62 BPM | WEIGHT: 170 LBS

## 2021-10-05 DIAGNOSIS — Z23 NEED FOR VACCINATION: ICD-10-CM

## 2021-10-05 DIAGNOSIS — K21.9 GASTROESOPHAGEAL REFLUX DISEASE WITHOUT ESOPHAGITIS: ICD-10-CM

## 2021-10-05 DIAGNOSIS — E78.00 HYPERCHOLESTEROLEMIA: ICD-10-CM

## 2021-10-05 DIAGNOSIS — Z12.11 COLON CANCER SCREENING: ICD-10-CM

## 2021-10-05 DIAGNOSIS — I25.10 CORONARY ARTERY DISEASE INVOLVING NATIVE CORONARY ARTERY OF NATIVE HEART WITHOUT ANGINA PECTORIS: ICD-10-CM

## 2021-10-05 DIAGNOSIS — H91.93 HEARING DIFFICULTY OF BOTH EARS: ICD-10-CM

## 2021-10-05 DIAGNOSIS — N40.1 BENIGN PROSTATIC HYPERPLASIA WITH NOCTURIA: ICD-10-CM

## 2021-10-05 DIAGNOSIS — J43.2 CENTRILOBULAR EMPHYSEMA (HCC): ICD-10-CM

## 2021-10-05 DIAGNOSIS — R35.1 BENIGN PROSTATIC HYPERPLASIA WITH NOCTURIA: ICD-10-CM

## 2021-10-05 DIAGNOSIS — Z00.00 ANNUAL PHYSICAL EXAM: Primary | ICD-10-CM

## 2021-10-05 DIAGNOSIS — Z00.00 ANNUAL PHYSICAL EXAM: ICD-10-CM

## 2021-10-05 PROBLEM — R53.83 LETHARGY: Status: RESOLVED | Noted: 2019-06-05 | Resolved: 2021-10-05

## 2021-10-05 PROCEDURE — 84153 ASSAY OF PSA TOTAL: CPT

## 2021-10-05 PROCEDURE — 3077F SYST BP >= 140 MM HG: CPT | Performed by: INTERNAL MEDICINE

## 2021-10-05 PROCEDURE — 36415 COLL VENOUS BLD VENIPUNCTURE: CPT

## 2021-10-05 PROCEDURE — 3008F BODY MASS INDEX DOCD: CPT | Performed by: INTERNAL MEDICINE

## 2021-10-05 PROCEDURE — 99397 PER PM REEVAL EST PAT 65+ YR: CPT | Performed by: INTERNAL MEDICINE

## 2021-10-05 PROCEDURE — 90662 IIV NO PRSV INCREASED AG IM: CPT | Performed by: INTERNAL MEDICINE

## 2021-10-05 PROCEDURE — 99213 OFFICE O/P EST LOW 20 MIN: CPT | Performed by: INTERNAL MEDICINE

## 2021-10-05 PROCEDURE — 3080F DIAST BP >= 90 MM HG: CPT | Performed by: INTERNAL MEDICINE

## 2021-10-05 PROCEDURE — 80053 COMPREHEN METABOLIC PANEL: CPT

## 2021-10-05 PROCEDURE — 80061 LIPID PANEL: CPT

## 2021-10-05 PROCEDURE — 90471 IMMUNIZATION ADMIN: CPT | Performed by: INTERNAL MEDICINE

## 2021-10-05 PROCEDURE — 85025 COMPLETE CBC W/AUTO DIFF WBC: CPT

## 2021-10-05 PROCEDURE — 84443 ASSAY THYROID STIM HORMONE: CPT

## 2021-10-05 RX ORDER — OMEPRAZOLE 40 MG/1
40 CAPSULE, DELAYED RELEASE ORAL DAILY
Qty: 90 CAPSULE | Refills: 3 | Status: SHIPPED | OUTPATIENT
Start: 2021-10-05

## 2021-10-06 ENCOUNTER — HOSPITAL ENCOUNTER (OUTPATIENT)
Age: 71
Discharge: HOME | End: 2021-10-06
Payer: COMMERCIAL

## 2021-10-06 VITALS
SYSTOLIC BLOOD PRESSURE: 115 MMHG | TEMPERATURE: 98 F | HEART RATE: 76 BPM | OXYGEN SATURATION: 100 % | DIASTOLIC BLOOD PRESSURE: 55 MMHG | RESPIRATION RATE: 18 BRPM

## 2021-10-06 DIAGNOSIS — H61.23 EXCESSIVE CERUMEN IN BOTH EAR CANALS: Primary | ICD-10-CM

## 2021-10-06 PROCEDURE — 99203 OFFICE O/P NEW LOW 30 MIN: CPT | Performed by: NURSE PRACTITIONER

## 2021-10-06 NOTE — ED INITIAL ASSESSMENT (HPI)
Pt states needs to have wax removed from his ears. States had appt for hearing aids this am and told to have wax removed. Awake/alert. Breathing easy and even without distress. Speech clear. Skin warm, dry and pink.

## 2021-10-06 NOTE — PROGRESS NOTES
Patient ID: Stone Niño is a 66year old male. Patient presents with:  Physical       HISTORY OF PRESENT ILLNESS:   HPI  Patient presents for above. Here for annual physical and to discuss multiple medical problems. Had angiogram done around 2015.   Kyrie De Luna HISTORY:     Past Medical History:   Diagnosis Date   • Acid reflux    • Age-related nuclear cataract of both eyes 6/24/2019   • CAD (coronary artery disease)    • COPD (chronic obstructive pulmonary disease) (HCC)    • Decreased lung capacity    • Deviate Cap, Take by mouth. (Patient not taking: Reported on 10/6/2021), Disp: , Rfl:   •  aspirin 81 MG Oral Tab, Take 1 tablet (81 mg total) by mouth daily. , Disp: 30 tablet, Rfl: 11  •  Probiotic Product (PROBIOTIC DAILY OR), Take by mouth., Disp: , Rfl:     Al Frequency of Communication with Friends and Family: Not on file      Frequency of Social Gatherings with Friends and Family: Not on file      Attends [employer] Services: Not on file      Active Member of Clubs or Organizations: Not on file      Attends Clu Cranial Nerves: No cranial nerve deficit. Psychiatric:         Mood and Affect: Mood normal.         Behavior: Behavior normal.           ASSESSMENT/PLAN:   1. Annual physical exam  · CBC WITH DIFFERENTIAL WITH PLATELET;  Future  · COMP METABOLIC PANEL (1

## 2021-10-06 NOTE — ED PROVIDER NOTES
Patient Seen in: [facility]      History   Patient presents with:  Ear Problem    Stated Complaint: Ear problem    Subjective:   HPI    68-year-old male presents to the immediate care requesting irrigation of both ears.   He states he was natasha Current:/55   Pulse 76   Temp 98.2 °F (36.8 °C) (Temporal)   Resp 18   SpO2 100%         Physical Exam  Vitals and nursing note reviewed. Constitutional:       Appearance: Normal appearance. HENT:      Head:      Comments:  There is wax in t

## 2021-10-12 ENCOUNTER — VIRTUAL PHONE E/M (OUTPATIENT)
Dept: INTERNAL MEDICINE CLINIC | Facility: CLINIC | Age: 71
End: 2021-10-12

## 2021-10-12 DIAGNOSIS — R06.2 WHEEZING: ICD-10-CM

## 2021-10-12 DIAGNOSIS — R06.02 SHORTNESS OF BREATH: Primary | ICD-10-CM

## 2021-10-12 PROCEDURE — 99213 OFFICE O/P EST LOW 20 MIN: CPT | Performed by: INTERNAL MEDICINE

## 2021-10-12 RX ORDER — PREDNISONE 20 MG/1
40 TABLET ORAL DAILY
Qty: 10 TABLET | Refills: 0 | Status: SHIPPED | OUTPATIENT
Start: 2021-10-12 | End: 2021-10-17

## 2021-10-12 NOTE — PROGRESS NOTES
Patient ID: Emma Humphries is a 66year old male. Patient presents with:  Sick Call       Virtual/Telephone Check-In    Emma Humphries verbally consents to a Virtual/Telephone Check-In service on 10/12/21.  Patient understands and accepts financial responsibilit MG Oral Tab, TAKE 1 TABLET (10 MG TOTAL) BY MOUTH NIGHTLY, Disp: 90 tablet, Rfl: 0  •  ATORVASTATIN 40 MG Oral Tab, TAKE ONE TABLET BY MOUTH NIGHTLY, Disp: 90 tablet, Rfl: 0  •  latanoprost 0.005 % Ophthalmic Solution, Place 1 drop into both eyes nightly. Back Care: Not Asked        Exercise: No        Bike Helmet: Not Asked        Seat Belt: Not Asked        Self-Exams: Not Asked    Social History Narrative      The patient does not use an assistive device. .        The patient does live in a home with stai Refill: 0  · Continue montelukast.    Return if symptoms worsen or fail to improve.     Time spent on encounter  11 minutes   Telephone time 6 minutes   Documentation time 5 minutes     Dina Byrd understands phone evaluation is not a substitute for face-t

## 2021-10-18 DIAGNOSIS — E78.00 HYPERCHOLESTEROLEMIA: ICD-10-CM

## 2021-10-19 RX ORDER — ATORVASTATIN CALCIUM 40 MG/1
TABLET, FILM COATED ORAL
Qty: 90 TABLET | Refills: 0 | Status: SHIPPED | OUTPATIENT
Start: 2021-10-19 | End: 2022-01-27

## 2021-10-20 NOTE — TELEPHONE ENCOUNTER
Spoke with patient. He missed his appointment last time because he was out of the [place]. Appointment for IOP and gonio was scheduled on 10/26/21 at 1:00 pm.  Patient says that he is using Latanoprost at bedtime OU as directed.   I stressed to him the im

## 2021-10-26 ENCOUNTER — OFFICE VISIT (OUTPATIENT)
Dept: OPHTHALMOLOGY | Facility: CLINIC | Age: 71
End: 2021-10-26

## 2021-10-26 DIAGNOSIS — H40.1131 PRIMARY OPEN ANGLE GLAUCOMA (POAG) OF BOTH EYES, MILD STAGE: Primary | ICD-10-CM

## 2021-10-26 PROBLEM — H40.1190 POAG (PRIMARY OPEN-ANGLE GLAUCOMA): Status: ACTIVE | Noted: 2021-05-25

## 2021-10-26 PROCEDURE — 92020 GONIOSCOPY: CPT | Performed by: OPHTHALMOLOGY

## 2021-10-26 PROCEDURE — 99213 OFFICE O/P EST LOW 20 MIN: CPT | Performed by: OPHTHALMOLOGY

## 2021-10-26 NOTE — PATIENT INSTRUCTIONS
POAG (primary open-angle glaucoma)  Gonioscopy completed in office today with results of open angles 360 degrees. Discussed with patient that this type of glaucoma is open angle  and there is no contraindication to taking over the counter cold remedies.

## 2021-10-26 NOTE — ASSESSMENT & PLAN NOTE
Gonioscopy completed in office today with results of open angles 360 degrees.    Discussed with patient that this type of glaucoma is open angle  and there is no contraindication to taking over the counter cold remedies. (anticholinergic medications)    Con

## 2021-12-30 DIAGNOSIS — J43.2 CENTRILOBULAR EMPHYSEMA (HCC): ICD-10-CM

## 2021-12-30 RX ORDER — MONTELUKAST SODIUM 10 MG/1
TABLET ORAL
Qty: 90 TABLET | Refills: 0 | Status: SHIPPED | OUTPATIENT
Start: 2021-12-30

## 2022-01-03 DIAGNOSIS — J43.2 CENTRILOBULAR EMPHYSEMA (HCC): ICD-10-CM

## 2022-01-03 RX ORDER — MONTELUKAST SODIUM 10 MG/1
TABLET ORAL
Qty: 90 TABLET | Refills: 0 | OUTPATIENT
Start: 2022-01-03

## 2022-01-27 DIAGNOSIS — E78.00 HYPERCHOLESTEROLEMIA: ICD-10-CM

## 2022-01-27 RX ORDER — ATORVASTATIN CALCIUM 40 MG/1
40 TABLET, FILM COATED ORAL NIGHTLY
Qty: 90 TABLET | Refills: 1 | Status: SHIPPED | OUTPATIENT
Start: 2022-01-27 | End: 2022-08-02

## 2022-01-27 NOTE — TELEPHONE ENCOUNTER
Refill passed per [place] [employer] [facility] protocol.     Requested Prescriptions   Pending Prescriptions Disp Refills    ATORVASTATIN 40 MG Oral Tab [Pharmacy Med Name: Atorvastatin Calcium Oral Tablet 40 MG] 90 tablet 0     Sig: take 1 tablet by mouth nightly        Cholesterol Medication Protocol Passed - 1/27/2022 12:30 AM        Passed - ALT in past 12 months        Passed - LDL in past 12 months        Passed - Last ALT < 80       Lab Results   Component Value Date    ALT 33 10/05/2021             Passed - Last LDL < 130     Lab Results   Component Value Date    LDL 51 10/05/2021               Passed - Appointment in past 12 or next 3 months              Recent Outpatient Visits              3 months ago Primary open angle glaucoma (POAG) of both eyes, mild stage    [facility] Britton Little MD    Office Visit    3 months ago Shortness of breath    Jason Luna [place], MD    [facility]    3 months ago Annual physical exam    Gopal Pedroza MD    Office Visit    6 months ago COVID-19    [facility] [address] [place], MD    Telemedicine    6 months ago Mild intermittent asthma with exacerbation    Carlin Quinonez MD    Telemedicine            Future Appointments         Provider Department Appt Notes    In 3 weeks Britton Little MD [facility] EP/ IOP check     In 4 months Hima Judge MD [facility], [address] 7 month f/u

## 2022-02-23 ENCOUNTER — TELEPHONE (OUTPATIENT)
Dept: OPHTHALMOLOGY | Facility: CLINIC | Age: 72
End: 2022-02-23

## 2022-03-18 ENCOUNTER — OFFICE VISIT (OUTPATIENT)
Dept: OTOLARYNGOLOGY | Facility: CLINIC | Age: 72
End: 2022-03-18
Payer: COMMERCIAL

## 2022-03-18 ENCOUNTER — NURSE TRIAGE (OUTPATIENT)
Dept: INTERNAL MEDICINE CLINIC | Facility: CLINIC | Age: 72
End: 2022-03-18

## 2022-03-18 VITALS — TEMPERATURE: 98 F

## 2022-03-18 DIAGNOSIS — M79.2 NEURALGIA: Primary | ICD-10-CM

## 2022-03-18 PROCEDURE — 99213 OFFICE O/P EST LOW 20 MIN: CPT | Performed by: OTOLARYNGOLOGY

## 2022-03-18 PROCEDURE — 31575 DIAGNOSTIC LARYNGOSCOPY: CPT | Performed by: OTOLARYNGOLOGY

## 2022-03-18 RX ORDER — METHYLPREDNISOLONE 4 MG/1
TABLET ORAL
Qty: 1 EACH | Refills: 0 | Status: SHIPPED | OUTPATIENT
Start: 2022-03-18

## 2022-03-18 NOTE — TELEPHONE ENCOUNTER
Left detailed message on home phone number per PAULY. [employer] message sent with referral information and ENT phone number.     ENT  [phone]

## 2022-03-21 ENCOUNTER — TELEPHONE (OUTPATIENT)
Dept: INTERNAL MEDICINE CLINIC | Facility: CLINIC | Age: 72
End: 2022-03-21

## 2022-03-21 NOTE — TELEPHONE ENCOUNTER
Patient seen in the office with ENT for dysphasia, neuralgia and was see by Dr Vadim Beatty and was prescribed methylPREDNISolone 4 MG Oral Tablet Therapy Pack, and took it over the last few days but it's not helping. Per patient he was advised by ENT to contact his pcp if the medication didn't help. Patient is having dysphasia even with swallowing fluids. He states, \"I can swallow but 50-60% of the time it's very difficult. I would like to see a Gastroenterologist as soon as possible. Can you place a referral? How soon can I schedule with them? Can you send the referral to Dr. Ron sutherland so that I can call GI? \" Advised I will pend a referral and send it to the another provider in the office for review/ approval but I cannot sign off on a referral without a provider signing it. I also advised that many specialists have a wait as he wanted to be seen today. He understood and is open to seeing any GI provider but would like to go to the hospital location. He asked that I send the phone # for GI to his my chart and I did.      Please review the pended referral, thanks

## 2022-03-23 ENCOUNTER — OFFICE VISIT (OUTPATIENT)
Dept: GASTROENTEROLOGY | Facility: CLINIC | Age: 72
End: 2022-03-23
Payer: COMMERCIAL

## 2022-03-23 ENCOUNTER — HOSPITAL ENCOUNTER (OUTPATIENT)
Dept: CT IMAGING | Facility: HOSPITAL | Age: 72
Discharge: HOME | End: 2022-03-23
Attending: INTERNAL MEDICINE
Payer: COMMERCIAL

## 2022-03-23 VITALS
SYSTOLIC BLOOD PRESSURE: 132 MMHG | HEIGHT: 69 IN | DIASTOLIC BLOOD PRESSURE: 78 MMHG | WEIGHT: 177 LBS | HEART RATE: 66 BPM | BODY MASS INDEX: 26.22 KG/M2

## 2022-03-23 DIAGNOSIS — R13.10 ODYNOPHAGIA: ICD-10-CM

## 2022-03-23 DIAGNOSIS — M54.2 NECK PAIN: ICD-10-CM

## 2022-03-23 DIAGNOSIS — M54.2 NECK PAIN: Primary | ICD-10-CM

## 2022-03-23 LAB — CREAT BLD-MCNC: 1 MG/DL

## 2022-03-23 PROCEDURE — 70491 CT SOFT TISSUE NECK W/DYE: CPT | Performed by: INTERNAL MEDICINE

## 2022-03-23 PROCEDURE — 3075F SYST BP GE 130 - 139MM HG: CPT | Performed by: INTERNAL MEDICINE

## 2022-03-23 PROCEDURE — 3008F BODY MASS INDEX DOCD: CPT | Performed by: INTERNAL MEDICINE

## 2022-03-23 PROCEDURE — 99213 OFFICE O/P EST LOW 20 MIN: CPT | Performed by: INTERNAL MEDICINE

## 2022-03-23 PROCEDURE — 3078F DIAST BP <80 MM HG: CPT | Performed by: INTERNAL MEDICINE

## 2022-03-23 PROCEDURE — 82565 ASSAY OF CREATININE: CPT

## 2022-03-23 NOTE — PATIENT INSTRUCTIONS
1.  CT scan of the neck. 2.  Take omeprazole 15-30 minutes before supper. 3.  Further recommendations pending results of the CT scan.

## 2022-04-02 RX ORDER — MONTELUKAST SODIUM 10 MG/1
10 TABLET ORAL NIGHTLY
Qty: 90 TABLET | Refills: 1 | Status: SHIPPED | OUTPATIENT
Start: 2022-04-02

## 2022-04-02 NOTE — TELEPHONE ENCOUNTER
Refill passed per [place] [facility] protocol   Requested Prescriptions   Pending Prescriptions Disp Refills    MONTELUKAST 10 MG Oral Tab [Pharmacy Med Name: Montelukast Sodium Oral Tablet 10 MG] 90 tablet 0     Sig: TAKE ONE TABLET BY MOUTH NIGHTLY        Asthma & COPD Medication Protocol Passed - 4/2/2022  2:46 AM        Passed - Appointment in past 6 or next 3 months

## 2022-04-04 ENCOUNTER — TELEPHONE (OUTPATIENT)
Dept: INTERNAL MEDICINE CLINIC | Facility: CLINIC | Age: 72
End: 2022-04-04

## 2022-04-04 NOTE — TELEPHONE ENCOUNTER
Dr. Lynne Eddy, please call patient; saw ent, had mri, stabbing swallowing pain still, no help so far. ct of neck showed no abnormality. Please call patient to advise. Has bad chest cold, coughing hurts him very much.

## 2022-04-04 NOTE — TELEPHONE ENCOUNTER
Spoke, with the patient and he did schedule a video visit with Dr. Monnie Goodpasture on 4-6-22 per the doctors request below.

## 2022-04-06 ENCOUNTER — TELEMEDICINE (OUTPATIENT)
Dept: INTERNAL MEDICINE CLINIC | Facility: CLINIC | Age: 72
End: 2022-04-06
Payer: COMMERCIAL

## 2022-04-06 DIAGNOSIS — J02.9 ACUTE PHARYNGITIS, UNSPECIFIED ETIOLOGY: ICD-10-CM

## 2022-04-06 DIAGNOSIS — R13.10 DYSPHAGIA, UNSPECIFIED TYPE: Primary | ICD-10-CM

## 2022-04-06 PROCEDURE — 99213 OFFICE O/P EST LOW 20 MIN: CPT | Performed by: INTERNAL MEDICINE

## 2022-04-06 RX ORDER — AZITHROMYCIN 250 MG/1
TABLET, FILM COATED ORAL
Qty: 6 TABLET | Refills: 0 | Status: SHIPPED | OUTPATIENT
Start: 2022-04-06 | End: 2022-04-11

## 2022-04-15 ENCOUNTER — OFFICE VISIT (OUTPATIENT)
Dept: OTOLARYNGOLOGY | Facility: CLINIC | Age: 72
End: 2022-04-15
Payer: COMMERCIAL

## 2022-04-15 VITALS — HEIGHT: 69 IN | BODY MASS INDEX: 26.22 KG/M2 | WEIGHT: 177 LBS

## 2022-04-15 DIAGNOSIS — M79.2 NEURALGIA: Primary | ICD-10-CM

## 2022-04-15 PROCEDURE — 99213 OFFICE O/P EST LOW 20 MIN: CPT | Performed by: OTOLARYNGOLOGY

## 2022-04-15 PROCEDURE — 3008F BODY MASS INDEX DOCD: CPT | Performed by: OTOLARYNGOLOGY

## 2022-04-25 ENCOUNTER — TELEPHONE (OUTPATIENT)
Dept: OTOLARYNGOLOGY | Facility: CLINIC | Age: 72
End: 2022-04-25

## 2022-04-25 NOTE — TELEPHONE ENCOUNTER
Per pt was referred to neurology, has appt with Dr. Stephen Michael and referral is required due to having [address], asking for referral to be put on mychart. Thank you.

## 2022-05-17 ENCOUNTER — HOSPITAL ENCOUNTER (OUTPATIENT)
Age: 72
Discharge: HOME | End: 2022-05-17
Payer: COMMERCIAL

## 2022-05-17 VITALS
OXYGEN SATURATION: 100 % | SYSTOLIC BLOOD PRESSURE: 148 MMHG | RESPIRATION RATE: 18 BRPM | HEART RATE: 78 BPM | TEMPERATURE: 98 F | DIASTOLIC BLOOD PRESSURE: 91 MMHG

## 2022-05-17 DIAGNOSIS — T16.2XXA FOREIGN BODY OF LEFT EAR, INITIAL ENCOUNTER: Primary | ICD-10-CM

## 2022-05-17 PROCEDURE — 99213 OFFICE O/P EST LOW 20 MIN: CPT | Performed by: NURSE PRACTITIONER

## 2022-05-17 NOTE — ED INITIAL ASSESSMENT (HPI)
Pt presents with hearing loss to left ear. Pt has hx of \"wax in ears\". Pt was also told the \"filter\" on his hearing aide is lodged in his left ear.

## 2022-07-07 ENCOUNTER — TELEMEDICINE (OUTPATIENT)
Dept: INTERNAL MEDICINE CLINIC | Facility: CLINIC | Age: 72
End: 2022-07-07

## 2022-07-07 DIAGNOSIS — J02.9 SORE THROAT: Primary | ICD-10-CM

## 2022-07-07 PROCEDURE — 99213 OFFICE O/P EST LOW 20 MIN: CPT | Performed by: INTERNAL MEDICINE

## 2022-07-08 ENCOUNTER — HOSPITAL ENCOUNTER (OUTPATIENT)
Age: 72
Discharge: HOME | End: 2022-07-08
Payer: COMMERCIAL

## 2022-07-08 VITALS
HEART RATE: 62 BPM | TEMPERATURE: 98 F | RESPIRATION RATE: 18 BRPM | DIASTOLIC BLOOD PRESSURE: 85 MMHG | OXYGEN SATURATION: 99 % | SYSTOLIC BLOOD PRESSURE: 148 MMHG

## 2022-07-08 DIAGNOSIS — J02.9 ACUTE VIRAL PHARYNGITIS: Primary | ICD-10-CM

## 2022-07-08 LAB — S PYO AG THROAT QL: NEGATIVE

## 2022-07-08 PROCEDURE — 99213 OFFICE O/P EST LOW 20 MIN: CPT

## 2022-07-08 PROCEDURE — 99212 OFFICE O/P EST SF 10 MIN: CPT

## 2022-07-08 PROCEDURE — 87880 STREP A ASSAY W/OPTIC: CPT

## 2022-07-09 NOTE — ED INITIAL ASSESSMENT (HPI)
Patient reports hx of persistent left sided throat discomfort. States he has been to specialists, ent and even had mri to evaluate this however the root cause was never found. States sensation did resolve. States symptoms returned over the last 7-10 days. Patient concerned for possibility of tonsillitis.

## 2022-07-12 NOTE — PROGRESS NOTES
Diagnosis: vocal cord nodules  Authorized # of Visits:  6         Next MD visit: none scheduled  Fall Risk: standard         Precautions: covid PPE             Subjective: Pt is here for his first voice therapy session.  Pt reports he has no stress or tensi breathing in conversation with 90% accuracy. The patient will use vocal strategies in words, sentences, rote speech tasks and then conversation with 90% accuracy.     The patient will reduced strained and harsh vocal quality and reduce vocal marlow and hard Home

## 2022-07-16 ENCOUNTER — OFFICE VISIT (OUTPATIENT)
Dept: OTOLARYNGOLOGY | Facility: CLINIC | Age: 72
End: 2022-07-16
Payer: COMMERCIAL

## 2022-07-16 DIAGNOSIS — J02.9 PHARYNGITIS, UNSPECIFIED ETIOLOGY: ICD-10-CM

## 2022-07-16 DIAGNOSIS — E06.9 THYROIDITIS: Primary | ICD-10-CM

## 2022-07-16 PROCEDURE — 31575 DIAGNOSTIC LARYNGOSCOPY: CPT | Performed by: SPECIALIST

## 2022-07-16 PROCEDURE — 99213 OFFICE O/P EST LOW 20 MIN: CPT | Performed by: SPECIALIST

## 2022-07-16 RX ORDER — DOXYCYCLINE HYCLATE 100 MG/1
100 CAPSULE ORAL 2 TIMES DAILY
Qty: 20 CAPSULE | Refills: 0 | Status: SHIPPED | OUTPATIENT
Start: 2022-07-16 | End: 2022-07-16 | Stop reason: CLARIF

## 2022-07-16 NOTE — PATIENT INSTRUCTIONS
You have persistent left neck pain. Your examination including fiberoptic exam did not show any abnormalities. Reviewed the CT scan, and there is a question of nodules in the thyroid. Is there is also some tenderness in the area, an ultrasound of this area was ordered. I will of course notify you of the results.

## 2022-07-18 ENCOUNTER — HOSPITAL ENCOUNTER (OUTPATIENT)
Dept: ULTRASOUND IMAGING | Facility: HOSPITAL | Age: 72
Discharge: HOME | End: 2022-07-18
Attending: SPECIALIST
Payer: COMMERCIAL

## 2022-07-18 DIAGNOSIS — E06.9 THYROIDITIS: ICD-10-CM

## 2022-07-18 PROCEDURE — 76536 US EXAM OF HEAD AND NECK: CPT | Performed by: SPECIALIST

## 2022-07-19 ENCOUNTER — TELEPHONE (OUTPATIENT)
Dept: OTOLARYNGOLOGY | Facility: CLINIC | Age: 72
End: 2022-07-19

## 2022-07-19 ENCOUNTER — LAB ENCOUNTER (OUTPATIENT)
Dept: LAB | Facility: HOSPITAL | Age: 72
End: 2022-07-19
Attending: INTERNAL MEDICINE
Payer: COMMERCIAL

## 2022-07-19 DIAGNOSIS — E06.9 THYROIDITIS: ICD-10-CM

## 2022-07-19 DIAGNOSIS — E06.9 THYROIDITIS: Primary | ICD-10-CM

## 2022-07-19 LAB — THYROPEROXIDASE AB SERPL-ACNC: <28 U/ML (ref ?–60)

## 2022-07-19 PROCEDURE — 36415 COLL VENOUS BLD VENIPUNCTURE: CPT

## 2022-07-19 PROCEDURE — 86376 MICROSOMAL ANTIBODY EACH: CPT

## 2022-07-20 ENCOUNTER — PATIENT MESSAGE (OUTPATIENT)
Dept: INTERNAL MEDICINE CLINIC | Facility: CLINIC | Age: 72
End: 2022-07-20

## 2022-07-20 DIAGNOSIS — E06.9 THYROIDITIS: Primary | ICD-10-CM

## 2022-07-21 NOTE — TELEPHONE ENCOUNTER
From: Angela Bridges  To: Mary Abarca MD  Sent: 7/20/2022 3:47 PM CDT  Subject: Ainsley Head Follow Up    Dear Dr. Yovany Sanchez  As per your recommendation I did see ENT specialist Dr. Fouzia Carrillo who ordered ultrasound testing of my thyroids and a Blood Test. After reading both results she recommends I see an Endocrinologist.     I will need a referral from you to take the next steps of setting up an appointment with an endocrinologist.     Thanks in advance for helping me deal with my continuing throat pain and extreme discomfort swallowing. I look forward to seeing you soon after completing the latest appointment with the endocrinologist.     Warm regards.   Ainsley Wallace

## 2022-07-22 NOTE — TELEPHONE ENCOUNTER
Good Afternoon Dr Amy Franklin and staff,    Please advise name of Endocrinologist you are referring patient to so can add to referral and verify if within network.     THank you    MIKE BRAVO

## 2022-07-24 NOTE — TELEPHONE ENCOUNTER
Dr. Monnie Goodpasture, referral incomplete,  Per managed care, please provide name of endocrinologist.

## 2022-07-25 NOTE — TELEPHONE ENCOUNTER
New referral placed. I put Dr. Eliana Pattesron as the referral but he should see first available provider.   If he gets an appointment with a different provider sooner I will update the referral.

## 2022-08-01 ENCOUNTER — LAB ENCOUNTER (OUTPATIENT)
Dept: LAB | Facility: HOSPITAL | Age: 72
End: 2022-08-01
Attending: INTERNAL MEDICINE
Payer: COMMERCIAL

## 2022-08-01 ENCOUNTER — OFFICE VISIT (OUTPATIENT)
Dept: ENDOCRINOLOGY CLINIC | Facility: CLINIC | Age: 72
End: 2022-08-01
Payer: COMMERCIAL

## 2022-08-01 VITALS
HEIGHT: 69 IN | DIASTOLIC BLOOD PRESSURE: 5 MMHG | HEART RATE: 64 BPM | WEIGHT: 170 LBS | RESPIRATION RATE: 16 BRPM | BODY MASS INDEX: 25.18 KG/M2 | SYSTOLIC BLOOD PRESSURE: 166 MMHG

## 2022-08-01 DIAGNOSIS — R59.1 LYMPHADENOPATHY: ICD-10-CM

## 2022-08-01 DIAGNOSIS — R13.10 ODYNOPHAGIA: ICD-10-CM

## 2022-08-01 DIAGNOSIS — E06.9 THYROIDITIS: ICD-10-CM

## 2022-08-01 DIAGNOSIS — E06.9 THYROIDITIS: Primary | ICD-10-CM

## 2022-08-01 LAB
T3FREE SERPL-MCNC: 3.01 PG/ML (ref 2.4–4.2)
T4 FREE SERPL-MCNC: 1 NG/DL (ref 0.8–1.7)
TSI SER-ACNC: 1.67 MIU/ML (ref 0.36–3.74)

## 2022-08-01 PROCEDURE — 84439 ASSAY OF FREE THYROXINE: CPT

## 2022-08-01 PROCEDURE — 99244 OFF/OP CNSLTJ NEW/EST MOD 40: CPT | Performed by: INTERNAL MEDICINE

## 2022-08-01 PROCEDURE — 84481 FREE ASSAY (FT-3): CPT

## 2022-08-01 PROCEDURE — 3077F SYST BP >= 140 MM HG: CPT | Performed by: INTERNAL MEDICINE

## 2022-08-01 PROCEDURE — 36415 COLL VENOUS BLD VENIPUNCTURE: CPT

## 2022-08-01 PROCEDURE — 84443 ASSAY THYROID STIM HORMONE: CPT

## 2022-08-01 PROCEDURE — 3008F BODY MASS INDEX DOCD: CPT | Performed by: INTERNAL MEDICINE

## 2022-08-01 PROCEDURE — 3078F DIAST BP <80 MM HG: CPT | Performed by: INTERNAL MEDICINE

## 2022-08-02 ENCOUNTER — TELEPHONE (OUTPATIENT)
Dept: ENDOCRINOLOGY CLINIC | Facility: CLINIC | Age: 72
End: 2022-08-02

## 2022-08-02 ENCOUNTER — PATIENT MESSAGE (OUTPATIENT)
Dept: ENDOCRINOLOGY CLINIC | Facility: CLINIC | Age: 72
End: 2022-08-02

## 2022-08-02 DIAGNOSIS — E78.00 HYPERCHOLESTEROLEMIA: ICD-10-CM

## 2022-08-02 PROBLEM — E06.9 THYROIDITIS: Status: ACTIVE | Noted: 2022-08-02

## 2022-08-02 PROBLEM — R13.10 ODYNOPHAGIA: Status: ACTIVE | Noted: 2022-08-02

## 2022-08-02 PROBLEM — R59.1 LYMPHADENOPATHY: Status: ACTIVE | Noted: 2022-08-02

## 2022-08-02 RX ORDER — ATORVASTATIN CALCIUM 40 MG/1
40 TABLET, FILM COATED ORAL NIGHTLY
Qty: 90 TABLET | Refills: 1 | Status: SHIPPED | OUTPATIENT
Start: 2022-08-02 | End: 2023-02-23

## 2022-08-02 NOTE — TELEPHONE ENCOUNTER
Luis Enrique Cam,    I had the pleasure of seeing Mr. Spencer Delgado yesterday. Upon examination of the neck area, I found that he had tenderness of what appeared to be a lymph node. Both the thyroid US as well as the CT scan mentioned bilateral lymphadenopathy. I had also checked thyroid function tests which are normal. The pain does not correspond to the thyroid gland, and with the thyroid function tests being normal, I do think that perhaps this pain should be further evaluated with another look perhaps through another ENT evaluation. He states that whenever he swallows, he has pain at the exact place where I am palpating. If you have any other questions, please do not hesitate to contact me.      Judy

## 2022-08-02 NOTE — TELEPHONE ENCOUNTER
Refill passed per [address] [place] protocol.     Requested Prescriptions   Pending Prescriptions Disp Refills    ATORVASTATIN 40 MG Oral Tab [Pharmacy Med Name: Atorvastatin Calcium Oral Tablet 40 MG] 90 tablet 0     Sig: TAKE ONE TABLET BY MOUTH NIGHTLY        Cholesterol Medication Protocol Passed - 8/2/2022  1:07 AM        Passed - ALT in past 12 months        Passed - LDL in past 12 months        Passed - Last ALT < 80       Lab Results   Component Value Date    ALT 33 10/05/2021             Passed - Last LDL < 130     Lab Results   Component Value Date    LDL 51 10/05/2021               Passed - In person appointment or virtual visit in the past 12 mos or appointment in next 3 mos       Recent Outpatient Visits              [address] [facility] Endocrinology Abiel Ramos MD    Office Visit    2 weeks ago [facility], [address], Sherin Ty MD    Office Visit    3 weeks ago Sore throat    [address] [place], [address], Jung Bergman MD    Telemedicine    3 months ago [facility] [place] [address], Luis Kwok MD    Office Visit    3 months ago Dysphagia, unspecified type    [address] [place], [address], Jung Bergman MD    Telemedicine                       Recent Outpatient Visits              [address] [facility] Endocrinology Abiel Ramos MD    Office Visit    2 weeks ago [facility], [address], Sherin Ty MD    Office Visit    3 weeks ago Sore throat    [address] [place], [address] Randalyn Silber, MD    Telemedicine    3 months ago [facility] [facility] Jesica Boland MD    Office Visit    3 months ago Dysphagia, unspecified type    [address], Jung Bergman MD    Telemedicine

## 2022-08-03 NOTE — TELEPHONE ENCOUNTER
Replied to patient's [employer] message with notes outlined in Dr. Concepción Pinon note below.

## 2022-08-03 NOTE — TELEPHONE ENCOUNTER
From: Francine Kennedy  To: Judy House MD  Sent: 8/2/2022 6:32 PM CDT  Subject: Following up    Sandra House  It was really a pleasure meeting you. Thank you for your examination and investigation. I got the blood test report and now waiting to hear what my next steps are. Meanwhile, the pain continues as before.    Thankfully   Francine Kennedy

## 2022-08-03 NOTE — TELEPHONE ENCOUNTER
Dr. Autumn Bae    Please advise. Read your result note. Do you want patient to follow with Dr. Ela Shah regarding lymph node pain?

## 2022-08-03 NOTE — TELEPHONE ENCOUNTER
Hi!  Please let patient know that I have written a note to Dr. Diamond Griffin, and perhaps he can also reach out to him about this, now that we know that the source of the pain is not his thyroid? If he is not able to make any headway, I can try again. Thank you!

## 2022-08-04 ENCOUNTER — TELEMEDICINE (OUTPATIENT)
Dept: INTERNAL MEDICINE CLINIC | Facility: CLINIC | Age: 72
End: 2022-08-04
Payer: COMMERCIAL

## 2022-08-04 DIAGNOSIS — E06.9 THYROIDITIS: Primary | ICD-10-CM

## 2022-08-04 DIAGNOSIS — J02.9 SORE THROAT: ICD-10-CM

## 2022-08-04 PROCEDURE — 99213 OFFICE O/P EST LOW 20 MIN: CPT | Performed by: INTERNAL MEDICINE

## 2022-08-04 NOTE — TELEPHONE ENCOUNTER
Patient has been contacted and scheduled a virtual visit with PCP today   Future Appointments   Date Time Provider Angel Romero   8/4/2022  2:15 PM William Ennis MD [facility] Daryle Kea to PCP

## 2022-08-05 ENCOUNTER — PATIENT MESSAGE (OUTPATIENT)
Dept: INTERNAL MEDICINE CLINIC | Facility: CLINIC | Age: 72
End: 2022-08-05

## 2022-08-05 ENCOUNTER — LAB ENCOUNTER (OUTPATIENT)
Dept: LAB | Facility: HOSPITAL | Age: 72
End: 2022-08-05
Attending: INTERNAL MEDICINE
Payer: COMMERCIAL

## 2022-08-05 DIAGNOSIS — E06.9 THYROIDITIS: ICD-10-CM

## 2022-08-05 DIAGNOSIS — J02.9 SORE THROAT: ICD-10-CM

## 2022-08-05 LAB — ERYTHROCYTE [SEDIMENTATION RATE] IN BLOOD: 13 MM/HR

## 2022-08-05 PROCEDURE — 85652 RBC SED RATE AUTOMATED: CPT

## 2022-08-05 PROCEDURE — 36415 COLL VENOUS BLD VENIPUNCTURE: CPT

## 2022-08-05 NOTE — TELEPHONE ENCOUNTER
From: Audrey Naik  To: Mau Lawrence MD  Sent: 7/20/2022 3:47 PM CDT  Subject: Hien Cervantes Follow Up    Dear Dr. Reid Condon  As per your recommendation I did see ENT specialist Dr. Bishop Biswas who ordered ultrasound testing of my thyroids and a Blood Test. After reading both results she recommends I see an Endocrinologist.     I will need a referral from you to take the next steps of setting up an appointment with an endocrinologist.     Thanks in advance for helping me deal with my continuing throat pain and extreme discomfort swallowing. I look forward to seeing you soon after completing the latest appointment with the endocrinologist.     Warm regards.   Hien Cervantes

## 2022-08-09 ENCOUNTER — PATIENT MESSAGE (OUTPATIENT)
Dept: INTERNAL MEDICINE CLINIC | Facility: CLINIC | Age: 72
End: 2022-08-09

## 2022-08-09 DIAGNOSIS — E06.9 THYROIDITIS: Primary | ICD-10-CM

## 2022-10-05 RX ORDER — LATANOPROST 50 UG/ML
SOLUTION/ DROPS OPHTHALMIC
Qty: 7.5 ML | Refills: 0 | OUTPATIENT
Start: 2022-10-05

## 2022-10-05 NOTE — TELEPHONE ENCOUNTER
LM on home and cell phone for patient to schedule an appointment. Routed to [facility] to decide if he would like to do one refill for now or not.

## 2022-10-05 NOTE — TELEPHONE ENCOUNTER
LM letting patient know that Dr Jose Conti will not refill Rx this time until he has an appointment made.

## 2022-10-09 DIAGNOSIS — J43.2 CENTRILOBULAR EMPHYSEMA (HCC): ICD-10-CM

## 2022-10-10 RX ORDER — MONTELUKAST SODIUM 10 MG/1
10 TABLET ORAL NIGHTLY
Qty: 90 TABLET | Refills: 1 | Status: SHIPPED | OUTPATIENT
Start: 2022-10-10 | End: 2023-04-24

## 2022-10-10 NOTE — TELEPHONE ENCOUNTER
Refill passed per [facility] protocol.     Requested Prescriptions   Pending Prescriptions Disp Refills    MONTELUKAST 10 MG Oral Tab [Pharmacy Med Name: Montelukast Sodium Oral Tablet 10 MG] 90 tablet 0     Sig: TAKE ONE TABLET BY MOUTH NIGHTLY        Asthma & COPD Medication Protocol Passed - 10/9/2022  1:28 AM        Passed - In person appointment or virtual visit in the past 6 mos or appointment in next 3 mos       Recent Outpatient Visits              2 months ago Judy Schultz, [address] Nelle Neighbours, MD    Telemedicine    2 months ago [address] Endocrinology Joni Porter MD    Office Visit    2 months ago [address], [address], Sammy Trevino MD    Office Visit    3 months ago Sore throat    [place] [facility], [address] Nelle Neighbours, MD    Telemedicine    5 months ago [address], [address], Gopal Barragan MD    Office Visit                       Recent Outpatient Visits              2 months ago Judy Schultz, [address] Nelle Neighbours, MD    Telemedicine    2 months ago [facility] Endocrinology Joni Porter MD    Office Visit    2 months ago [facility], [address] [facility] [address], Sammy Trevino MD    Office Visit    3 months ago Sore throat    [facility], [address] Nelle Neighbours, MD    Telemedicine    5 months ago [address] [facility] Noe Ayala MD    Office Visit

## 2023-02-22 DIAGNOSIS — E78.00 HYPERCHOLESTEROLEMIA: ICD-10-CM

## 2023-02-23 RX ORDER — ATORVASTATIN CALCIUM 40 MG/1
40 TABLET, FILM COATED ORAL NIGHTLY
Qty: 90 TABLET | Refills: 1 | Status: SHIPPED | OUTPATIENT
Start: 2023-02-23

## 2023-03-01 ENCOUNTER — TELEMEDICINE (OUTPATIENT)
Dept: INTERNAL MEDICINE CLINIC | Facility: CLINIC | Age: 73
End: 2023-03-01

## 2023-03-01 DIAGNOSIS — M26.623 BILATERAL TEMPOROMANDIBULAR JOINT PAIN: Primary | ICD-10-CM

## 2023-03-01 PROCEDURE — 99213 OFFICE O/P EST LOW 20 MIN: CPT | Performed by: INTERNAL MEDICINE

## 2023-03-06 ENCOUNTER — LAB ENCOUNTER (OUTPATIENT)
Dept: LAB | Facility: HOSPITAL | Age: 73
End: 2023-03-06
Attending: INTERNAL MEDICINE
Payer: COMMERCIAL

## 2023-03-06 ENCOUNTER — TELEMEDICINE (OUTPATIENT)
Facility: CLINIC | Age: 73
End: 2023-03-06

## 2023-03-06 ENCOUNTER — PATIENT MESSAGE (OUTPATIENT)
Facility: CLINIC | Age: 73
End: 2023-03-06

## 2023-03-06 DIAGNOSIS — M79.10 MYALGIA: Primary | ICD-10-CM

## 2023-03-06 DIAGNOSIS — M79.10 MYALGIA: ICD-10-CM

## 2023-03-06 DIAGNOSIS — M24.20 EAGLE'S SYNDROME: ICD-10-CM

## 2023-03-06 LAB
BASOPHILS # BLD AUTO: 0.08 X10(3) UL (ref 0–0.2)
BASOPHILS NFR BLD AUTO: 1.5 %
CRP SERPL-MCNC: <0.29 MG/DL (ref ?–0.3)
DEPRECATED RDW RBC AUTO: 48.3 FL (ref 35.1–46.3)
EOSINOPHIL # BLD AUTO: 0.21 X10(3) UL (ref 0–0.7)
EOSINOPHIL NFR BLD AUTO: 4.1 %
ERYTHROCYTE [DISTWIDTH] IN BLOOD BY AUTOMATED COUNT: 14.1 % (ref 11–15)
ERYTHROCYTE [SEDIMENTATION RATE] IN BLOOD: 36 MM/HR
HCT VFR BLD AUTO: 45.8 %
HGB BLD-MCNC: 14.6 G/DL
IMM GRANULOCYTES # BLD AUTO: 0.02 X10(3) UL (ref 0–1)
IMM GRANULOCYTES NFR BLD: 0.4 %
LYMPHOCYTES # BLD AUTO: 1.78 X10(3) UL (ref 1–4)
LYMPHOCYTES NFR BLD AUTO: 34.4 %
MCH RBC QN AUTO: 29.7 PG (ref 26–34)
MCHC RBC AUTO-ENTMCNC: 31.9 G/DL (ref 31–37)
MCV RBC AUTO: 93.3 FL
MONOCYTES # BLD AUTO: 0.6 X10(3) UL (ref 0.1–1)
MONOCYTES NFR BLD AUTO: 11.6 %
NEUTROPHILS # BLD AUTO: 2.49 X10 (3) UL (ref 1.5–7.7)
NEUTROPHILS # BLD AUTO: 2.49 X10(3) UL (ref 1.5–7.7)
NEUTROPHILS NFR BLD AUTO: 48 %
PLATELET # BLD AUTO: 183 10(3)UL (ref 150–450)
RBC # BLD AUTO: 4.91 X10(6)UL
TSI SER-ACNC: 2.66 MIU/ML (ref 0.36–3.74)
WBC # BLD AUTO: 5.2 X10(3) UL (ref 4–11)

## 2023-03-06 PROCEDURE — 84443 ASSAY THYROID STIM HORMONE: CPT

## 2023-03-06 PROCEDURE — 85652 RBC SED RATE AUTOMATED: CPT

## 2023-03-06 PROCEDURE — 85025 COMPLETE CBC W/AUTO DIFF WBC: CPT

## 2023-03-06 PROCEDURE — 36415 COLL VENOUS BLD VENIPUNCTURE: CPT

## 2023-03-06 PROCEDURE — 86140 C-REACTIVE PROTEIN: CPT

## 2023-03-06 RX ORDER — PREDNISONE 1 MG/1
TABLET ORAL
Qty: 90 TABLET | Refills: 0 | Status: SHIPPED | OUTPATIENT
Start: 2023-03-06

## 2023-03-06 NOTE — PROGRESS NOTES
Labs are not totally consistent with polymyalgia rheumatica, however your symptoms are. I recommend a trial of Prednisone 15 mg daily for 2 weeks and I will send this to your pharmacy. Please follow-up with Dr. Lori Lares before you complete this, because after 2 weeks the dose would need to be adjusted.

## 2023-03-07 ENCOUNTER — OFFICE VISIT (OUTPATIENT)
Dept: OTOLARYNGOLOGY | Facility: CLINIC | Age: 73
End: 2023-03-07

## 2023-03-07 VITALS — BODY MASS INDEX: 25.18 KG/M2 | WEIGHT: 170 LBS | HEIGHT: 69 IN | TEMPERATURE: 98 F

## 2023-03-07 DIAGNOSIS — D89.89 AUTOIMMUNE DISORDER (HCC): Primary | ICD-10-CM

## 2023-03-07 DIAGNOSIS — M79.9: ICD-10-CM

## 2023-03-07 PROCEDURE — 3008F BODY MASS INDEX DOCD: CPT | Performed by: SPECIALIST

## 2023-03-07 PROCEDURE — 99214 OFFICE O/P EST MOD 30 MIN: CPT | Performed by: SPECIALIST

## 2023-03-12 ENCOUNTER — PATIENT MESSAGE (OUTPATIENT)
Dept: OTOLARYNGOLOGY | Facility: CLINIC | Age: 73
End: 2023-03-12

## 2023-03-15 NOTE — TELEPHONE ENCOUNTER
From: Harmony Iqbal  To: Ramakrishna Arvizu MD  Sent: 3/12/2023 5:39 PM CDT  Subject: Rheumatologist appointment     Sandra Ibarra  As per your referral following my last visit with you I did contact all 3 Rheumatologists you recommended. Unfortunately, the earliest appointment I was able to get was with Dr. Zurdo Morse - and that too not before April 19, which is 5 weeks away. The  was very helpful and has put me on a waitlist for any earlier opening. She also suggested that I contact you to request you if you talk directly with ANY of the specialists and see if there is any way anyone of them can see me at an earlier - at [address] location at ANY time. As you know I am under pain all over my body which has disrupted my sleep and daily functions quite badly. Anything you can do to help will be greatly appreciated. Thanks.     Harmony Iqbal

## 2023-04-04 ENCOUNTER — LAB ENCOUNTER (OUTPATIENT)
Dept: LAB | Facility: HOSPITAL | Age: 73
End: 2023-04-04
Attending: INTERNAL MEDICINE
Payer: COMMERCIAL

## 2023-04-04 ENCOUNTER — OFFICE VISIT (OUTPATIENT)
Dept: RHEUMATOLOGY | Facility: CLINIC | Age: 73
End: 2023-04-04

## 2023-04-04 VITALS
HEIGHT: 69 IN | DIASTOLIC BLOOD PRESSURE: 60 MMHG | HEART RATE: 81 BPM | WEIGHT: 170 LBS | SYSTOLIC BLOOD PRESSURE: 124 MMHG | BODY MASS INDEX: 25.18 KG/M2

## 2023-04-04 DIAGNOSIS — M35.3 PMR (POLYMYALGIA RHEUMATICA) (HCC): Primary | ICD-10-CM

## 2023-04-04 DIAGNOSIS — R68.84 JAW PAIN: ICD-10-CM

## 2023-04-04 LAB
CRP SERPL-MCNC: 0.31 MG/DL (ref ?–0.3)
ERYTHROCYTE [SEDIMENTATION RATE] IN BLOOD: 18 MM/HR
RHEUMATOID FACT SERPL-ACNC: <10 IU/ML (ref ?–15)

## 2023-04-04 PROCEDURE — 3008F BODY MASS INDEX DOCD: CPT | Performed by: INTERNAL MEDICINE

## 2023-04-04 PROCEDURE — 36415 COLL VENOUS BLD VENIPUNCTURE: CPT | Performed by: INTERNAL MEDICINE

## 2023-04-04 PROCEDURE — 86140 C-REACTIVE PROTEIN: CPT | Performed by: INTERNAL MEDICINE

## 2023-04-04 PROCEDURE — 85652 RBC SED RATE AUTOMATED: CPT | Performed by: INTERNAL MEDICINE

## 2023-04-04 PROCEDURE — 99204 OFFICE O/P NEW MOD 45 MIN: CPT | Performed by: INTERNAL MEDICINE

## 2023-04-04 PROCEDURE — 3074F SYST BP LT 130 MM HG: CPT | Performed by: INTERNAL MEDICINE

## 2023-04-04 PROCEDURE — 3078F DIAST BP <80 MM HG: CPT | Performed by: INTERNAL MEDICINE

## 2023-04-04 PROCEDURE — 86431 RHEUMATOID FACTOR QUANT: CPT | Performed by: INTERNAL MEDICINE

## 2023-04-04 PROCEDURE — 86200 CCP ANTIBODY: CPT | Performed by: INTERNAL MEDICINE

## 2023-04-04 RX ORDER — PREDNISONE 1 MG/1
12.5 TABLET ORAL DAILY
Qty: 90 TABLET | Refills: 0 | Status: SHIPPED | OUTPATIENT
Start: 2023-04-04

## 2023-04-04 NOTE — PATIENT INSTRUCTIONS
You were seen today for shoulder, hip/thigh stiffness due to PMR  Continue prednisone 12.5 mg daily for now  You are also having jaw pain, I would like for you to get a temporal artery biopsy.   Please call the surgeon to make an appointment  Blood work today  I will reach out to Dr. Ev Da Silva if you notice any TMJ specialist  You will see me in 3 to 4 weeks after you get the biopsy

## 2023-04-05 ENCOUNTER — TELEPHONE (OUTPATIENT)
Dept: SURGERY | Facility: CLINIC | Age: 73
End: 2023-04-05

## 2023-04-05 NOTE — TELEPHONE ENCOUNTER
Called pt and made consult for Monday. Location and phone number reviewed. Patient verbalized understanding and will call if concerns.

## 2023-04-10 ENCOUNTER — OFFICE VISIT (OUTPATIENT)
Dept: SURGERY | Facility: CLINIC | Age: 73
End: 2023-04-10

## 2023-04-10 DIAGNOSIS — M31.6 TEMPORAL ARTERITIS (HCC): Primary | ICD-10-CM

## 2023-04-10 PROCEDURE — 99203 OFFICE O/P NEW LOW 30 MIN: CPT | Performed by: SURGERY

## 2023-04-11 ENCOUNTER — TELEPHONE (OUTPATIENT)
Dept: SURGERY | Facility: CLINIC | Age: 73
End: 2023-04-11

## 2023-04-12 LAB — CCP IGG SERPL-ACNC: 1.4 U/ML (ref 0–6.9)

## 2023-04-12 NOTE — TELEPHONE ENCOUNTER
RC  Contact made [facility] the patient   URGENT surgery added to schedule with Myrtle Carlisle MD on 4-18-23.       Temporal artery Biopsy      KIMMY

## 2023-04-14 ENCOUNTER — TELEPHONE (OUTPATIENT)
Dept: SURGERY | Facility: CLINIC | Age: 73
End: 2023-04-14

## 2023-04-18 ENCOUNTER — HOSPITAL ENCOUNTER (OUTPATIENT)
Facility: HOSPITAL | Age: 73
Setting detail: HOSPITAL OUTPATIENT SURGERY
Discharge: HOME | End: 2023-04-18
Attending: SURGERY | Admitting: SURGERY
Payer: COMMERCIAL

## 2023-04-18 ENCOUNTER — ANESTHESIA (OUTPATIENT)
Dept: SURGERY | Facility: HOSPITAL | Age: 73
End: 2023-04-18
Payer: COMMERCIAL

## 2023-04-18 ENCOUNTER — ANESTHESIA EVENT (OUTPATIENT)
Dept: SURGERY | Facility: HOSPITAL | Age: 73
End: 2023-04-18
Payer: COMMERCIAL

## 2023-04-18 VITALS
TEMPERATURE: 97 F | SYSTOLIC BLOOD PRESSURE: 130 MMHG | DIASTOLIC BLOOD PRESSURE: 85 MMHG | HEIGHT: 66 IN | BODY MASS INDEX: 26.03 KG/M2 | OXYGEN SATURATION: 99 % | RESPIRATION RATE: 14 BRPM | WEIGHT: 162 LBS | HEART RATE: 68 BPM

## 2023-04-18 DIAGNOSIS — M31.6 TEMPORAL ARTERITIS (HCC): ICD-10-CM

## 2023-04-18 PROCEDURE — 38500 BIOPSY/REMOVAL LYMPH NODES: CPT | Performed by: SURGERY

## 2023-04-18 PROCEDURE — 07B00ZX EXCISION OF HEAD LYMPHATIC, OPEN APPROACH, DIAGNOSTIC: ICD-10-PCS | Performed by: SURGERY

## 2023-04-18 PROCEDURE — 37609 LIGATION/BX TEMPORAL ARTERY: CPT | Performed by: SURGERY

## 2023-04-18 PROCEDURE — 03BT0ZX EXCISION OF LEFT TEMPORAL ARTERY, OPEN APPROACH, DIAGNOSTIC: ICD-10-PCS | Performed by: SURGERY

## 2023-04-18 RX ORDER — SODIUM CHLORIDE, SODIUM LACTATE, POTASSIUM CHLORIDE, CALCIUM CHLORIDE 600; 310; 30; 20 MG/100ML; MG/100ML; MG/100ML; MG/100ML
INJECTION, SOLUTION INTRAVENOUS CONTINUOUS
Status: DISCONTINUED | OUTPATIENT
Start: 2023-04-18 | End: 2023-04-18

## 2023-04-18 RX ORDER — LIDOCAINE HYDROCHLORIDE 10 MG/ML
INJECTION, SOLUTION EPIDURAL; INFILTRATION; INTRACAUDAL; PERINEURAL AS NEEDED
Status: DISCONTINUED | OUTPATIENT
Start: 2023-04-18 | End: 2023-04-18 | Stop reason: HOSPADM

## 2023-04-18 RX ORDER — LIDOCAINE HYDROCHLORIDE 10 MG/ML
INJECTION, SOLUTION EPIDURAL; INFILTRATION; INTRACAUDAL; PERINEURAL AS NEEDED
Status: DISCONTINUED | OUTPATIENT
Start: 2023-04-18 | End: 2023-04-18 | Stop reason: SURG

## 2023-04-18 RX ORDER — MORPHINE SULFATE 4 MG/ML
4 INJECTION, SOLUTION INTRAMUSCULAR; INTRAVENOUS EVERY 10 MIN PRN
Status: DISCONTINUED | OUTPATIENT
Start: 2023-04-18 | End: 2023-04-18

## 2023-04-18 RX ORDER — NALOXONE HYDROCHLORIDE 0.4 MG/ML
80 INJECTION, SOLUTION INTRAMUSCULAR; INTRAVENOUS; SUBCUTANEOUS AS NEEDED
Status: DISCONTINUED | OUTPATIENT
Start: 2023-04-18 | End: 2023-04-18

## 2023-04-18 RX ORDER — ACETAMINOPHEN 500 MG
1000 TABLET ORAL ONCE
Status: COMPLETED | OUTPATIENT
Start: 2023-04-18 | End: 2023-04-18

## 2023-04-18 RX ORDER — HYDROMORPHONE HYDROCHLORIDE 1 MG/ML
0.2 INJECTION, SOLUTION INTRAMUSCULAR; INTRAVENOUS; SUBCUTANEOUS EVERY 5 MIN PRN
Status: DISCONTINUED | OUTPATIENT
Start: 2023-04-18 | End: 2023-04-18

## 2023-04-18 RX ORDER — ONDANSETRON 2 MG/ML
4 INJECTION INTRAMUSCULAR; INTRAVENOUS EVERY 6 HOURS PRN
Status: DISCONTINUED | OUTPATIENT
Start: 2023-04-18 | End: 2023-04-18

## 2023-04-18 RX ORDER — HYDROMORPHONE HYDROCHLORIDE 1 MG/ML
0.6 INJECTION, SOLUTION INTRAMUSCULAR; INTRAVENOUS; SUBCUTANEOUS EVERY 5 MIN PRN
Status: DISCONTINUED | OUTPATIENT
Start: 2023-04-18 | End: 2023-04-18

## 2023-04-18 RX ORDER — IBUPROFEN 600 MG/1
600 TABLET ORAL EVERY 6 HOURS PRN
Qty: 15 TABLET | Refills: 1 | Status: SHIPPED | OUTPATIENT
Start: 2023-04-18 | End: 2023-04-25

## 2023-04-18 RX ORDER — CEFAZOLIN SODIUM/WATER 2 G/20 ML
2 SYRINGE (ML) INTRAVENOUS ONCE
Status: COMPLETED | OUTPATIENT
Start: 2023-04-18 | End: 2023-04-18

## 2023-04-18 RX ORDER — MORPHINE SULFATE 4 MG/ML
2 INJECTION, SOLUTION INTRAMUSCULAR; INTRAVENOUS EVERY 10 MIN PRN
Status: DISCONTINUED | OUTPATIENT
Start: 2023-04-18 | End: 2023-04-18

## 2023-04-18 RX ORDER — METOCLOPRAMIDE HYDROCHLORIDE 5 MG/ML
10 INJECTION INTRAMUSCULAR; INTRAVENOUS EVERY 8 HOURS PRN
Status: DISCONTINUED | OUTPATIENT
Start: 2023-04-18 | End: 2023-04-18

## 2023-04-18 RX ORDER — HYDROMORPHONE HYDROCHLORIDE 1 MG/ML
0.4 INJECTION, SOLUTION INTRAMUSCULAR; INTRAVENOUS; SUBCUTANEOUS EVERY 5 MIN PRN
Status: DISCONTINUED | OUTPATIENT
Start: 2023-04-18 | End: 2023-04-18

## 2023-04-18 RX ORDER — MORPHINE SULFATE 10 MG/ML
6 INJECTION, SOLUTION INTRAMUSCULAR; INTRAVENOUS EVERY 10 MIN PRN
Status: DISCONTINUED | OUTPATIENT
Start: 2023-04-18 | End: 2023-04-18

## 2023-04-18 RX ORDER — MIDAZOLAM HYDROCHLORIDE 1 MG/ML
INJECTION INTRAMUSCULAR; INTRAVENOUS AS NEEDED
Status: DISCONTINUED | OUTPATIENT
Start: 2023-04-18 | End: 2023-04-18 | Stop reason: SURG

## 2023-04-18 RX ADMIN — LIDOCAINE HYDROCHLORIDE 30 MG: 10 INJECTION, SOLUTION EPIDURAL; INFILTRATION; INTRACAUDAL; PERINEURAL at 07:40:00

## 2023-04-18 RX ADMIN — SODIUM CHLORIDE, SODIUM LACTATE, POTASSIUM CHLORIDE, CALCIUM CHLORIDE: 600; 310; 30; 20 INJECTION, SOLUTION INTRAVENOUS at 07:33:00

## 2023-04-18 RX ADMIN — LIDOCAINE HYDROCHLORIDE 20 MG: 10 INJECTION, SOLUTION EPIDURAL; INFILTRATION; INTRACAUDAL; PERINEURAL at 07:42:00

## 2023-04-18 RX ADMIN — SODIUM CHLORIDE, SODIUM LACTATE, POTASSIUM CHLORIDE, CALCIUM CHLORIDE: 600; 310; 30; 20 INJECTION, SOLUTION INTRAVENOUS at 08:00:00

## 2023-04-18 RX ADMIN — CEFAZOLIN SODIUM/WATER 2 G: 2 G/20 ML SYRINGE (ML) INTRAVENOUS at 07:44:00

## 2023-04-18 RX ADMIN — MIDAZOLAM HYDROCHLORIDE 2 MG: 1 INJECTION INTRAMUSCULAR; INTRAVENOUS at 07:40:00

## 2023-04-18 NOTE — INTERVAL H&P NOTE
Pre-op Diagnosis: Temporal arteritis ([facility] [address] [M31.6]    The above referenced H&P was reviewed by Yanick Jarrett MD on 4/18/2023, the patient was examined and no significant changes have occurred in the patient's condition since the H&P was performed. I discussed with the patient and/or legal representative the potential benefits, risks and side effects of this procedure; the likelihood of the patient achieving goals; and potential problems that might occur during recuperation. I discussed reasonable alternatives to the procedure, including risks, benefits and side effects related to the alternatives and risks related to not receiving this procedure. We will proceed with procedure as planned.

## 2023-04-18 NOTE — OPERATIVE REPORT
Cristofer Calderón    PATIENT'S NAME: Leandro Grove   ATTENDING PHYSICIAN: Edi Mckay MD   OPERATING PHYSICIAN: Edi Mckay MD   PATIENT ACCOUNT#:   [de-identified]    LOCATION:  [facility] OR [address]  MEDICAL RECORD #:   [id number]       YOB: 1950  ADMISSION DATE:       04/18/2023      OPERATION DATE:  04/18/2023    OPERATIVE REPORT    PREOPERATIVE DIAGNOSIS:  Temporal arteritis. POSTOPERATIVE DIAGNOSIS:  Temporal arteritis. PROCEDURE:  Left temporal artery biopsy. ASSISTANT:  Janese Nissen, RSA. ESTIMATED BLOOD LOSS:  2 mL. COMPLICATIONS:  None. ANESTHESIA:  MAC.    DISPOSITION:  To Recovery, tolerated well. INDICATIONS:  The patient is a pleasant 58-year-old with the above complaint. Consent obtained. OPERATIVE TECHNIQUE:  He is taken surgery. He is prepped and draped in usual sterile fashion. Local was infiltrated in the preauricular skin. Incision is made. Temporal artery is identified and dissected. Approximately 2.5 cm of temporal artery and a branch are excised. The ends are ligated with 3-0 Vicryl. This is sent to Pathology. Wound irrigated, closed in layers using 4-0 Monocryl and Dermabond. He tolerated this well.      Dictated By Edi Mckay MD  d: 04/18/2023 08:03:39  t: 04/18/2023 08:10:10  [facility] [id number]  GL/    cc: MD Rosita Alvarenga MD

## 2023-04-20 NOTE — OPERATIVE REPORT
[facility]    PATIENT'S NAME: Ed Serrato   ATTENDING PHYSICIAN: Kory Stewart MD   OPERATING PHYSICIAN: Kory Stewart MD   PATIENT ACCOUNT#:   [de-identified]    LOCATION:  [address]  MEDICAL RECORD #:   [id number]       YOB: 1950  ADMISSION DATE:       04/18/2023      OPERATION DATE:  04/18/2023    OPERATIVE REPORT     (Corrected report 4/20/2023; addendum added to a signed report.)      PREOPERATIVE DIAGNOSIS:  Temporal arteritis. POSTOPERATIVE DIAGNOSIS:  Temporal arteritis. PROCEDURE:  Left temporal artery biopsy. ASSISTANT:  CHICHI Hurtado. ESTIMATED BLOOD LOSS:  2 mL. COMPLICATIONS:  None. ANESTHESIA:  MAC.     DISPOSITION:  To Recovery, tolerated well. INDICATIONS:  The patient is a pleasant 13-year-old with the above complaint. Consent obtained. OPERATIVE TECHNIQUE:  He is taken surgery. He is prepped and draped in usual sterile fashion. Local was infiltrated in the preauricular skin. Incision is made. Temporal artery is identified and dissected. Approximately 2.5 cm of temporal artery and a branch are excised. The ends are ligated with 3-0 Vicryl. This is sent to Pathology. Wound irrigated, closed in layers using 4-0 Monocryl and Dermabond. He tolerated this well. ADDENDUM:  (Also job [id number])    PROCEDURE:  Lymph node biopsy. Patient had a left temporal artery biopsy. Adjacent to the temporal artery was an enlarged lymph node which was dissected and excised, sent to Pathology at the same time as the temporal artery.      Dictated By Kory Stewart MD  d: 04/18/2023 08:03:39  t: 04/20/2023 09:19:47  Job [id number]  GL/

## 2023-06-07 ENCOUNTER — OFFICE VISIT (OUTPATIENT)
Dept: INTERNAL MEDICINE CLINIC | Facility: CLINIC | Age: 73
End: 2023-06-07

## 2023-06-07 ENCOUNTER — LAB ENCOUNTER (OUTPATIENT)
Dept: LAB | Age: 73
End: 2023-06-07
Attending: INTERNAL MEDICINE
Payer: COMMERCIAL

## 2023-06-07 ENCOUNTER — PATIENT MESSAGE (OUTPATIENT)
Dept: INTERNAL MEDICINE CLINIC | Facility: CLINIC | Age: 73
End: 2023-06-07

## 2023-06-07 VITALS
SYSTOLIC BLOOD PRESSURE: 124 MMHG | DIASTOLIC BLOOD PRESSURE: 76 MMHG | HEIGHT: 69 IN | WEIGHT: 163 LBS | OXYGEN SATURATION: 99 % | HEART RATE: 84 BPM | BODY MASS INDEX: 24.14 KG/M2

## 2023-06-07 DIAGNOSIS — R35.1 BENIGN PROSTATIC HYPERPLASIA WITH NOCTURIA: ICD-10-CM

## 2023-06-07 DIAGNOSIS — Z00.00 ANNUAL PHYSICAL EXAM: Primary | ICD-10-CM

## 2023-06-07 DIAGNOSIS — E78.00 HYPERCHOLESTEROLEMIA: ICD-10-CM

## 2023-06-07 DIAGNOSIS — Z00.00 ANNUAL PHYSICAL EXAM: ICD-10-CM

## 2023-06-07 DIAGNOSIS — N40.1 BENIGN PROSTATIC HYPERPLASIA WITH NOCTURIA: ICD-10-CM

## 2023-06-07 DIAGNOSIS — E06.9 THYROIDITIS: ICD-10-CM

## 2023-06-07 DIAGNOSIS — K21.9 GASTROESOPHAGEAL REFLUX DISEASE WITHOUT ESOPHAGITIS: ICD-10-CM

## 2023-06-07 DIAGNOSIS — J43.2 CENTRILOBULAR EMPHYSEMA (HCC): ICD-10-CM

## 2023-06-07 DIAGNOSIS — H40.003 GLAUCOMA SUSPECT OF BOTH EYES: ICD-10-CM

## 2023-06-07 DIAGNOSIS — I25.10 CORONARY ARTERY DISEASE INVOLVING NATIVE CORONARY ARTERY OF NATIVE HEART WITHOUT ANGINA PECTORIS: ICD-10-CM

## 2023-06-07 DIAGNOSIS — E78.00 HYPERCHOLESTEROLEMIA: Primary | ICD-10-CM

## 2023-06-07 DIAGNOSIS — Z12.11 COLON CANCER SCREENING: ICD-10-CM

## 2023-06-07 PROBLEM — H91.93 HEARING DIFFICULTY OF BOTH EARS: Status: RESOLVED | Noted: 2019-06-05 | Resolved: 2023-06-07

## 2023-06-07 LAB
ALBUMIN SERPL-MCNC: 3.8 G/DL (ref 3.4–5)
ALBUMIN/GLOB SERPL: 1 {RATIO} (ref 1–2)
ALP LIVER SERPL-CCNC: 64 U/L
ALT SERPL-CCNC: 26 U/L
ANION GAP SERPL CALC-SCNC: 8 MMOL/L (ref 0–18)
AST SERPL-CCNC: 21 U/L (ref 15–37)
BASOPHILS # BLD AUTO: 0.08 X10(3) UL (ref 0–0.2)
BASOPHILS NFR BLD AUTO: 1.2 %
BILIRUB SERPL-MCNC: 0.4 MG/DL (ref 0.1–2)
BUN BLD-MCNC: 14 MG/DL (ref 7–18)
BUN/CREAT SERPL: 13.5 (ref 10–20)
CALCIUM BLD-MCNC: 10.4 MG/DL (ref 8.5–10.1)
CHLORIDE SERPL-SCNC: 108 MMOL/L (ref 98–112)
CHOLEST SERPL-MCNC: 212 MG/DL (ref ?–200)
CO2 SERPL-SCNC: 27 MMOL/L (ref 21–32)
CREAT BLD-MCNC: 1.04 MG/DL
DEPRECATED RDW RBC AUTO: 47.8 FL (ref 35.1–46.3)
EOSINOPHIL # BLD AUTO: 0.49 X10(3) UL (ref 0–0.7)
EOSINOPHIL NFR BLD AUTO: 7.5 %
ERYTHROCYTE [DISTWIDTH] IN BLOOD BY AUTOMATED COUNT: 13.5 % (ref 11–15)
FASTING PATIENT LIPID ANSWER: YES
FASTING STATUS PATIENT QL REPORTED: YES
GFR SERPLBLD BASED ON 1.73 SQ M-ARVRAT: 75 ML/MIN/1.73M2 (ref 60–?)
GLOBULIN PLAS-MCNC: 3.7 G/DL (ref 2.8–4.4)
GLUCOSE BLD-MCNC: 113 MG/DL (ref 70–99)
HCT VFR BLD AUTO: 48.2 %
HDLC SERPL-MCNC: 54 MG/DL (ref 40–59)
HGB BLD-MCNC: 15.2 G/DL
IMM GRANULOCYTES # BLD AUTO: 0.02 X10(3) UL (ref 0–1)
IMM GRANULOCYTES NFR BLD: 0.3 %
LDLC SERPL CALC-MCNC: 130 MG/DL (ref ?–100)
LYMPHOCYTES # BLD AUTO: 2 X10(3) UL (ref 1–4)
LYMPHOCYTES NFR BLD AUTO: 30.6 %
MCH RBC QN AUTO: 30.2 PG (ref 26–34)
MCHC RBC AUTO-ENTMCNC: 31.5 G/DL (ref 31–37)
MCV RBC AUTO: 95.8 FL
MONOCYTES # BLD AUTO: 0.74 X10(3) UL (ref 0.1–1)
MONOCYTES NFR BLD AUTO: 11.3 %
NEUTROPHILS # BLD AUTO: 3.2 X10 (3) UL (ref 1.5–7.7)
NEUTROPHILS # BLD AUTO: 3.2 X10(3) UL (ref 1.5–7.7)
NEUTROPHILS NFR BLD AUTO: 49.1 %
NONHDLC SERPL-MCNC: 158 MG/DL (ref ?–130)
OSMOLALITY SERPL CALC.SUM OF ELEC: 297 MOSM/KG (ref 275–295)
PLATELET # BLD AUTO: 153 10(3)UL (ref 150–450)
POTASSIUM SERPL-SCNC: 4.1 MMOL/L (ref 3.5–5.1)
PROT SERPL-MCNC: 7.5 G/DL (ref 6.4–8.2)
PSA SERPL-MCNC: 0.68 NG/ML (ref ?–4)
RBC # BLD AUTO: 5.03 X10(6)UL
SODIUM SERPL-SCNC: 143 MMOL/L (ref 136–145)
TRIGL SERPL-MCNC: 157 MG/DL (ref 30–149)
TSI SER-ACNC: 2.77 MIU/ML (ref 0.36–3.74)
VLDLC SERPL CALC-MCNC: 28 MG/DL (ref 0–30)
WBC # BLD AUTO: 6.5 X10(3) UL (ref 4–11)

## 2023-06-07 PROCEDURE — 3078F DIAST BP <80 MM HG: CPT | Performed by: INTERNAL MEDICINE

## 2023-06-07 PROCEDURE — 36415 COLL VENOUS BLD VENIPUNCTURE: CPT

## 2023-06-07 PROCEDURE — 80061 LIPID PANEL: CPT

## 2023-06-07 PROCEDURE — 3074F SYST BP LT 130 MM HG: CPT | Performed by: INTERNAL MEDICINE

## 2023-06-07 PROCEDURE — 84443 ASSAY THYROID STIM HORMONE: CPT

## 2023-06-07 PROCEDURE — 99214 OFFICE O/P EST MOD 30 MIN: CPT | Performed by: INTERNAL MEDICINE

## 2023-06-07 PROCEDURE — 3008F BODY MASS INDEX DOCD: CPT | Performed by: INTERNAL MEDICINE

## 2023-06-07 PROCEDURE — 84153 ASSAY OF PSA TOTAL: CPT

## 2023-06-07 PROCEDURE — 99397 PER PM REEVAL EST PAT 65+ YR: CPT | Performed by: INTERNAL MEDICINE

## 2023-06-07 PROCEDURE — 85025 COMPLETE CBC W/AUTO DIFF WBC: CPT

## 2023-06-07 PROCEDURE — 80053 COMPREHEN METABOLIC PANEL: CPT

## 2023-06-08 ENCOUNTER — PATIENT MESSAGE (OUTPATIENT)
Dept: INTERNAL MEDICINE CLINIC | Facility: CLINIC | Age: 73
End: 2023-06-08

## 2023-06-12 RX ORDER — EZETIMIBE 10 MG/1
10 TABLET ORAL DAILY
Qty: 90 TABLET | Refills: 3 | Status: SHIPPED | OUTPATIENT
Start: 2023-06-12 | End: 2024-06-06

## 2023-10-03 ENCOUNTER — TELEPHONE (OUTPATIENT)
Dept: INTERNAL MEDICINE CLINIC | Facility: CLINIC | Age: 73
End: 2023-10-03

## 2023-10-03 NOTE — TELEPHONE ENCOUNTER
Wife called.  They are traveling tomorrow.  Dr Dr Hernandez advise patient to receive Covid, Flu & RSV vaccines today?  Call back asap [phone]

## 2023-10-04 NOTE — TELEPHONE ENCOUNTER
Called patient and advised Provider does recommend COVID and influenza vaccine, RSV can be held off. Patient wife answered and verbalized understanding of providers recommendations, patient wife did ask if vaccine is covered by insurance, MA advised patient wife she will need to ask her insurance, no further questions

## 2023-11-15 ENCOUNTER — NURSE TRIAGE (OUTPATIENT)
Dept: INTERNAL MEDICINE CLINIC | Facility: CLINIC | Age: 73
End: 2023-11-15

## 2023-11-15 NOTE — TELEPHONE ENCOUNTER
Action Requested: Summary for Provider     []  Critical Lab, Recommendations Needed  [] Need Additional Advice  []   FYI    []   Need Orders  [] Need Medications Sent to Pharmacy  []  Other     SUMMARY: Advised per protocol:  see in office within 3 days. Patient currently in [place] and does not get in until . Offered appointment with available provider on  after they get back. States he will go to [facility] on . Reason for call: Dizziness  Onset: 3 weeks     Patient and spouse Rodolfo calling -on PAULY (patient name and  verified) who states they were in [facility] at the end of October where patient felt very dizzy and like he was going to pass out a few times. EEG and MRI of brain were done in [place], both were negative. A few days ago, prior to bedtime, felt dizzy again, head spinning, especially when lying down. Yesterday, had dizziness agin. Was given Meclizine by a doctor friend which was effective. Taken twice yesterday and once today. Again having dizziness this morning. Currently in [place]. Returning on . Taking OTC LipoFlavonoid for balance once daily which patient feels that is helping his symptoms. Slept better last night after taking the OTC supplement and the Meclizine. Would like to have ear checked for possible infection.         Reason for Disposition   MILD dizziness (e.g., walking normally) and has NOT been evaluated by physician for this (Exception: dizziness caused by heat exposure, sudden standing, or poor fluid intake)    Protocols used: Dizziness-A-OH

## 2023-11-20 ENCOUNTER — TELEPHONE (OUTPATIENT)
Facility: CLINIC | Age: 73
End: 2023-11-20

## 2023-11-20 ENCOUNTER — OFFICE VISIT (OUTPATIENT)
Facility: CLINIC | Age: 73
End: 2023-11-20

## 2023-11-20 VITALS
WEIGHT: 165 LBS | BODY MASS INDEX: 24.44 KG/M2 | HEART RATE: 80 BPM | OXYGEN SATURATION: 98 % | RESPIRATION RATE: 18 BRPM | SYSTOLIC BLOOD PRESSURE: 138 MMHG | DIASTOLIC BLOOD PRESSURE: 82 MMHG | HEIGHT: 69 IN

## 2023-11-20 DIAGNOSIS — R55 PRE-SYNCOPE: Primary | ICD-10-CM

## 2023-11-20 DIAGNOSIS — Z12.11 SCREENING FOR COLON CANCER: Primary | ICD-10-CM

## 2023-11-20 DIAGNOSIS — I25.10 CORONARY ARTERY DISEASE INVOLVING NATIVE CORONARY ARTERY OF NATIVE HEART WITHOUT ANGINA PECTORIS: ICD-10-CM

## 2023-11-20 PROCEDURE — 3008F BODY MASS INDEX DOCD: CPT | Performed by: INTERNAL MEDICINE

## 2023-11-20 PROCEDURE — 3075F SYST BP GE 130 - 139MM HG: CPT | Performed by: INTERNAL MEDICINE

## 2023-11-20 PROCEDURE — 3079F DIAST BP 80-89 MM HG: CPT | Performed by: INTERNAL MEDICINE

## 2023-11-20 PROCEDURE — 99214 OFFICE O/P EST MOD 30 MIN: CPT | Performed by: INTERNAL MEDICINE

## 2023-11-20 PROCEDURE — 90471 IMMUNIZATION ADMIN: CPT | Performed by: INTERNAL MEDICINE

## 2023-11-20 PROCEDURE — 90662 IIV NO PRSV INCREASED AG IM: CPT | Performed by: INTERNAL MEDICINE

## 2023-11-20 RX ORDER — MECLIZINE HCL 12.5 MG/1
12.5 TABLET ORAL 3 TIMES DAILY PRN
COMMUNITY

## 2023-11-20 RX ORDER — MECLIZINE HCL 12.5 MG/1
12.5 TABLET ORAL 3 TIMES DAILY PRN
Qty: 30 TABLET | Refills: 0 | Status: SHIPPED | OUTPATIENT
Start: 2023-11-20

## 2023-11-20 NOTE — PATIENT INSTRUCTIONS
Please do the non-fasting blood tests soon.   Please do not take vitamins or supplements for 3 days prior to the blood test.

## 2023-11-21 ENCOUNTER — LAB ENCOUNTER (OUTPATIENT)
Dept: LAB | Age: 73
End: 2023-11-21
Attending: INTERNAL MEDICINE
Payer: COMMERCIAL

## 2023-11-21 DIAGNOSIS — R55 PRE-SYNCOPE: ICD-10-CM

## 2023-11-21 DIAGNOSIS — E78.00 HYPERCHOLESTEROLEMIA: ICD-10-CM

## 2023-11-21 LAB
ALBUMIN SERPL-MCNC: 4.5 G/DL (ref 3.2–4.8)
ALP LIVER SERPL-CCNC: 59 U/L
ALT SERPL-CCNC: 23 U/L
ANION GAP SERPL CALC-SCNC: 2 MMOL/L (ref 0–18)
AST SERPL-CCNC: 23 U/L (ref ?–34)
BASOPHILS # BLD AUTO: 0.1 X10(3) UL (ref 0–0.2)
BASOPHILS NFR BLD AUTO: 1.3 %
BILIRUB DIRECT SERPL-MCNC: 0.3 MG/DL (ref ?–0.3)
BILIRUB SERPL-MCNC: 1 MG/DL (ref 0.2–1.1)
BUN BLD-MCNC: 11 MG/DL (ref 9–23)
BUN/CREAT SERPL: 9.6 (ref 10–20)
CALCIUM BLD-MCNC: 10.1 MG/DL (ref 8.7–10.4)
CHLORIDE SERPL-SCNC: 105 MMOL/L (ref 98–112)
CHOLEST SERPL-MCNC: 200 MG/DL (ref ?–200)
CO2 SERPL-SCNC: 30 MMOL/L (ref 21–32)
CREAT BLD-MCNC: 1.15 MG/DL
DEPRECATED RDW RBC AUTO: 46.6 FL (ref 35.1–46.3)
EGFRCR SERPLBLD CKD-EPI 2021: 66 ML/MIN/1.73M2 (ref 60–?)
EOSINOPHIL # BLD AUTO: 0.59 X10(3) UL (ref 0–0.7)
EOSINOPHIL NFR BLD AUTO: 7.7 %
ERYTHROCYTE [DISTWIDTH] IN BLOOD BY AUTOMATED COUNT: 13.7 % (ref 11–15)
FASTING PATIENT LIPID ANSWER: YES
FASTING STATUS PATIENT QL REPORTED: YES
GLUCOSE BLD-MCNC: 111 MG/DL (ref 70–99)
HCT VFR BLD AUTO: 48.2 %
HDLC SERPL-MCNC: 57 MG/DL (ref 40–59)
HGB BLD-MCNC: 15.5 G/DL
IMM GRANULOCYTES # BLD AUTO: 0.01 X10(3) UL (ref 0–1)
IMM GRANULOCYTES NFR BLD: 0.1 %
LDLC SERPL CALC-MCNC: 119 MG/DL (ref ?–100)
LYMPHOCYTES # BLD AUTO: 1.97 X10(3) UL (ref 1–4)
LYMPHOCYTES NFR BLD AUTO: 25.8 %
MCH RBC QN AUTO: 29.8 PG (ref 26–34)
MCHC RBC AUTO-ENTMCNC: 32.2 G/DL (ref 31–37)
MCV RBC AUTO: 92.5 FL
MONOCYTES # BLD AUTO: 0.68 X10(3) UL (ref 0.1–1)
MONOCYTES NFR BLD AUTO: 8.9 %
NEUTROPHILS # BLD AUTO: 4.29 X10 (3) UL (ref 1.5–7.7)
NEUTROPHILS # BLD AUTO: 4.29 X10(3) UL (ref 1.5–7.7)
NEUTROPHILS NFR BLD AUTO: 56.2 %
NONHDLC SERPL-MCNC: 143 MG/DL (ref ?–130)
OSMOLALITY SERPL CALC.SUM OF ELEC: 284 MOSM/KG (ref 275–295)
PLATELET # BLD AUTO: 180 10(3)UL (ref 150–450)
POTASSIUM SERPL-SCNC: 4.3 MMOL/L (ref 3.5–5.1)
PROT SERPL-MCNC: 7.5 G/DL (ref 5.7–8.2)
RBC # BLD AUTO: 5.21 X10(6)UL
SODIUM SERPL-SCNC: 137 MMOL/L (ref 136–145)
TRIGL SERPL-MCNC: 135 MG/DL (ref 30–149)
TSI SER-ACNC: 1.93 MIU/ML (ref 0.55–4.78)
VIT B12 SERPL-MCNC: 1098 PG/ML (ref 211–911)
VLDLC SERPL CALC-MCNC: 24 MG/DL (ref 0–30)
WBC # BLD AUTO: 7.6 X10(3) UL (ref 4–11)

## 2023-11-21 PROCEDURE — 80048 BASIC METABOLIC PNL TOTAL CA: CPT

## 2023-11-21 PROCEDURE — 80061 LIPID PANEL: CPT

## 2023-11-21 PROCEDURE — 80076 HEPATIC FUNCTION PANEL: CPT

## 2023-11-21 PROCEDURE — 36415 COLL VENOUS BLD VENIPUNCTURE: CPT

## 2023-11-21 PROCEDURE — 82607 VITAMIN B-12: CPT

## 2023-11-21 PROCEDURE — 84443 ASSAY THYROID STIM HORMONE: CPT

## 2023-11-21 PROCEDURE — 85025 COMPLETE CBC W/AUTO DIFF WBC: CPT

## 2023-11-22 ENCOUNTER — OFFICE VISIT (OUTPATIENT)
Dept: OTOLARYNGOLOGY | Facility: CLINIC | Age: 73
End: 2023-11-22
Payer: COMMERCIAL

## 2023-11-22 VITALS — WEIGHT: 165 LBS | BODY MASS INDEX: 24 KG/M2

## 2023-11-22 DIAGNOSIS — R42 DIZZINESS: ICD-10-CM

## 2023-11-22 DIAGNOSIS — H61.23 BILATERAL IMPACTED CERUMEN: ICD-10-CM

## 2023-11-22 DIAGNOSIS — R94.118 ABNORMAL VIDEONYSTAGMOGRAM (VNG): Primary | ICD-10-CM

## 2023-11-25 ENCOUNTER — HOSPITAL ENCOUNTER (OUTPATIENT)
Age: 73
Setting detail: SURGERY ADMIT
End: 2023-11-25

## 2023-11-25 ENCOUNTER — HOSPITAL ENCOUNTER (INPATIENT)
Age: 73
LOS: 2 days | Discharge: HOME | End: 2023-11-27
Attending: EMERGENCY MEDICINE | Admitting: SURGERY

## 2023-11-25 ENCOUNTER — APPOINTMENT (OUTPATIENT)
Dept: GENERAL RADIOLOGY | Age: 73
End: 2023-11-25
Attending: EMERGENCY MEDICINE

## 2023-11-25 ENCOUNTER — APPOINTMENT (OUTPATIENT)
Dept: CARDIOLOGY | Age: 73
End: 2023-11-25
Attending: INTERNAL MEDICINE

## 2023-11-25 DIAGNOSIS — I44.2 THIRD DEGREE HEART BLOCK (CMD): Primary | ICD-10-CM

## 2023-11-25 DIAGNOSIS — Z95.0 PRESENCE OF CARDIAC PACEMAKER: ICD-10-CM

## 2023-11-25 DIAGNOSIS — I44.2 COMPLETE HEART BLOCK (CMD): ICD-10-CM

## 2023-11-25 LAB
ALBUMIN SERPL-MCNC: 3.6 G/DL (ref 3.6–5.1)
ALBUMIN/GLOB SERPL: 1 {RATIO} (ref 1–2.4)
ALP SERPL-CCNC: 63 UNITS/L (ref 45–117)
ALT SERPL-CCNC: 35 UNITS/L
ANION GAP SERPL CALC-SCNC: 8 MMOL/L (ref 7–19)
ANION GAP SERPL CALC-SCNC: 8 MMOL/L (ref 7–19)
AORTIC VALVE AREA (AVA): 0.59
AORTIC VALVE AREA: 2.09
APTT PPP: 25 SEC (ref 22–32)
AST SERPL-CCNC: 33 UNITS/L
ATRIAL RATE (BPM): 89
AV MEAN GRADIENT (AVMG): 4
AV MEAN VELOCITY (AVMV): 0.89
AV PEAK GRADIENT (AVPG): 8
AV PEAK VELOCITY (AVPV): 1.45
AV STENOSIS SEVERITY TEXT: NORMAL
AVI LVOT PEAK GRADIENT (LVOTMG): 1.2
BASOPHILS # BLD: 0.1 K/MCL (ref 0–0.3)
BASOPHILS # BLD: 0.1 K/MCL (ref 0–0.3)
BASOPHILS NFR BLD: 1 %
BASOPHILS NFR BLD: 1 %
BILIRUB SERPL-MCNC: 0.3 MG/DL (ref 0.2–1)
BUN SERPL-MCNC: 15 MG/DL (ref 6–20)
BUN SERPL-MCNC: 19 MG/DL (ref 6–20)
BUN/CREAT SERPL: 15 (ref 7–25)
BUN/CREAT SERPL: 19 (ref 7–25)
CALCIUM SERPL-MCNC: 10 MG/DL (ref 8.4–10.2)
CALCIUM SERPL-MCNC: 9.7 MG/DL (ref 8.4–10.2)
CHLORIDE SERPL-SCNC: 107 MMOL/L (ref 97–110)
CHLORIDE SERPL-SCNC: 108 MMOL/L (ref 97–110)
CHOLEST SERPL-MCNC: 186 MG/DL
CHOLEST/HDLC SERPL: 3.3 {RATIO}
CO2 SERPL-SCNC: 27 MMOL/L (ref 21–32)
CO2 SERPL-SCNC: 28 MMOL/L (ref 21–32)
CREAT SERPL-MCNC: 0.98 MG/DL (ref 0.67–1.17)
CREAT SERPL-MCNC: 0.99 MG/DL (ref 0.67–1.17)
DEPRECATED RDW RBC: 47.2 FL (ref 39–50)
DEPRECATED RDW RBC: 47.3 FL (ref 39–50)
EGFRCR SERPLBLD CKD-EPI 2021: 79 ML/MIN/{1.73_M2}
EGFRCR SERPLBLD CKD-EPI 2021: 80 ML/MIN/{1.73_M2}
EOSINOPHIL # BLD: 0.2 K/MCL (ref 0–0.5)
EOSINOPHIL # BLD: 0.4 K/MCL (ref 0–0.5)
EOSINOPHIL NFR BLD: 2 %
EOSINOPHIL NFR BLD: 3 %
ERYTHROCYTE [DISTWIDTH] IN BLOOD: 13.7 % (ref 11–15)
ERYTHROCYTE [DISTWIDTH] IN BLOOD: 13.8 % (ref 11–15)
FASTING DURATION TIME PATIENT: ABNORMAL H
FASTING DURATION TIME PATIENT: ABNORMAL H
GLOBULIN SER-MCNC: 3.5 G/DL (ref 2–4)
GLUCOSE BLDC GLUCOMTR-MCNC: 116 MG/DL (ref 70–99)
GLUCOSE SERPL-MCNC: 144 MG/DL (ref 70–99)
GLUCOSE SERPL-MCNC: 166 MG/DL (ref 70–99)
HBA1C MFR BLD: 5.7 % (ref 4.5–5.6)
HCT VFR BLD CALC: 44.9 % (ref 39–51)
HCT VFR BLD CALC: 45 % (ref 39–51)
HDLC SERPL-MCNC: 57 MG/DL
HGB BLD-MCNC: 14.5 G/DL (ref 13–17)
HGB BLD-MCNC: 14.5 G/DL (ref 13–17)
IMM GRANULOCYTES # BLD AUTO: 0 K/MCL (ref 0–0.2)
IMM GRANULOCYTES # BLD AUTO: 0.1 K/MCL (ref 0–0.2)
IMM GRANULOCYTES # BLD: 0 %
IMM GRANULOCYTES # BLD: 1 %
INR PPP: 1.1
INTERVENTRICULAR SEPTUM IN END DIASTOLE (IVSD): 1.88
LDLC SERPL CALC-MCNC: 84 MG/DL
LEFT INTERNAL DIMENSION IN SYSTOLE (LVSD): 1.3
LEFT VENTRICULAR INTERNAL DIMENSION IN DIASTOLE (LVDD): 2.7
LEFT VENTRICULAR POSTERIOR WALL IN END DIASTOLE (LVPW): 3.7
LV EF: NORMAL %
LVOT 2D (LVOTD): 14.8
LVOT VTI (LVOTVTI): 0.63
LYMPHOCYTES # BLD: 1.9 K/MCL (ref 1–4)
LYMPHOCYTES # BLD: 1.9 K/MCL (ref 1–4)
LYMPHOCYTES NFR BLD: 16 %
LYMPHOCYTES NFR BLD: 17 %
MAGNESIUM SERPL-MCNC: 2.3 MG/DL (ref 1.7–2.4)
MAGNESIUM SERPL-MCNC: 2.5 MG/DL (ref 1.7–2.4)
MCH RBC QN AUTO: 30.1 PG (ref 26–34)
MCH RBC QN AUTO: 30.1 PG (ref 26–34)
MCHC RBC AUTO-ENTMCNC: 32.2 G/DL (ref 32–36.5)
MCHC RBC AUTO-ENTMCNC: 32.3 G/DL (ref 32–36.5)
MCV RBC AUTO: 93.3 FL (ref 78–100)
MCV RBC AUTO: 93.4 FL (ref 78–100)
MONOCYTES # BLD: 0.6 K/MCL (ref 0.3–0.9)
MONOCYTES # BLD: 0.9 K/MCL (ref 0.3–0.9)
MONOCYTES NFR BLD: 6 %
MONOCYTES NFR BLD: 7 %
MV E TISSUE VEL MED (MESV): 5.98
MV PEAK A VELOCITY (MVPAV): 111
MV PEAK E VELOCITY (MVPEV): 1.06
NEUTROPHILS # BLD: 8.3 K/MCL (ref 1.8–7.7)
NEUTROPHILS # BLD: 8.9 K/MCL (ref 1.8–7.7)
NEUTROPHILS NFR BLD: 72 %
NEUTROPHILS NFR BLD: 74 %
NONHDLC SERPL-MCNC: 129 MG/DL
NRBC BLD MANUAL-RTO: 0 /100 WBC
NRBC BLD MANUAL-RTO: 0 /100 WBC
NT-PROBNP SERPL-MCNC: 240 PG/ML
P AXIS (DEGREES): 43
PHOSPHATE SERPL-MCNC: 2.3 MG/DL (ref 2.4–4.7)
PLATELET # BLD AUTO: 159 K/MCL (ref 140–450)
PLATELET # BLD AUTO: 166 K/MCL (ref 140–450)
POTASSIUM SERPL-SCNC: 4 MMOL/L (ref 3.4–5.1)
POTASSIUM SERPL-SCNC: 4.1 MMOL/L (ref 3.4–5.1)
PR-INTERVAL (MSEC): 224
PROT SERPL-MCNC: 7.1 G/DL (ref 6.4–8.2)
PROTHROMBIN TIME: 11.3 SEC (ref 9.7–11.8)
QRS-INTERVAL (MSEC): 148
QT-INTERVAL (MSEC): 404
QTC: 430
R AXIS (DEGREES): -78
RBC # BLD: 4.81 MIL/MCL (ref 4.5–5.9)
RBC # BLD: 4.82 MIL/MCL (ref 4.5–5.9)
REPORT TEXT: NORMAL
RV END SYSTOLIC LONGITUDINAL STRAIN FREE WALL (RVGS): 2
SODIUM SERPL-SCNC: 138 MMOL/L (ref 135–145)
SODIUM SERPL-SCNC: 140 MMOL/L (ref 135–145)
T AXIS (DEGREES): -9
TRICUSPID VALVE PEAK REGURGITATION VELOCITY (TRPV): 3.1
TRIGL SERPL-MCNC: 227 MG/DL
TROPONIN I SERPL DL<=0.01 NG/ML-MCNC: 117 NG/L
TSH SERPL-ACNC: 2.03 MCUNITS/ML (ref 0.35–5)
TV ESTIMATED RIGHT ARTERIAL PRESSURE (RAP): 13.6
VENTRICULAR RATE EKG/MIN (BPM): 68
WBC # BLD: 11.1 K/MCL (ref 4.2–11)
WBC # BLD: 12.2 K/MCL (ref 4.2–11)

## 2023-11-25 PROCEDURE — 93306 TTE W/DOPPLER COMPLETE: CPT | Performed by: INTERNAL MEDICINE

## 2023-11-25 PROCEDURE — 10002805 HB CONTRAST AGENT: Performed by: INTERNAL MEDICINE

## 2023-11-25 PROCEDURE — C1730 CATH, EP, 19 OR FEW ELECT: HCPCS | Performed by: INTERNAL MEDICINE

## 2023-11-25 PROCEDURE — 84443 ASSAY THYROID STIM HORMONE: CPT | Performed by: EMERGENCY MEDICINE

## 2023-11-25 PROCEDURE — 10002803 HB RX 637: Performed by: SURGERY

## 2023-11-25 PROCEDURE — 99152 MOD SED SAME PHYS/QHP 5/>YRS: CPT | Performed by: INTERNAL MEDICINE

## 2023-11-25 PROCEDURE — 10002800 HB RX 250 W HCPCS: Performed by: INTERNAL MEDICINE

## 2023-11-25 PROCEDURE — 99291 CRITICAL CARE FIRST HOUR: CPT

## 2023-11-25 PROCEDURE — C1898 LEAD, PMKR, OTHER THAN TRANS: HCPCS | Performed by: INTERNAL MEDICINE

## 2023-11-25 PROCEDURE — 93306 TTE W/DOPPLER COMPLETE: CPT

## 2023-11-25 PROCEDURE — 99291 CRITICAL CARE FIRST HOUR: CPT | Performed by: INTERNAL MEDICINE

## 2023-11-25 PROCEDURE — 10002805 HB CONTRAST AGENT: Performed by: SURGERY

## 2023-11-25 PROCEDURE — 10006025 HB SUPPLY 275: Performed by: INTERNAL MEDICINE

## 2023-11-25 PROCEDURE — C1785 PMKR, DUAL, RATE-RESP: HCPCS | Performed by: INTERNAL MEDICINE

## 2023-11-25 PROCEDURE — 02HK3JZ INSERTION OF PACEMAKER LEAD INTO RIGHT VENTRICLE, PERCUTANEOUS APPROACH: ICD-10-PCS | Performed by: INTERNAL MEDICINE

## 2023-11-25 PROCEDURE — 80053 COMPREHEN METABOLIC PANEL: CPT | Performed by: EMERGENCY MEDICINE

## 2023-11-25 PROCEDURE — 5A1223Z PERFORMANCE OF CARDIAC PACING, CONTINUOUS: ICD-10-PCS | Performed by: INTERNAL MEDICINE

## 2023-11-25 PROCEDURE — 10002800 HB RX 250 W HCPCS: Performed by: SURGERY

## 2023-11-25 PROCEDURE — 83036 HEMOGLOBIN GLYCOSYLATED A1C: CPT | Performed by: SURGERY

## 2023-11-25 PROCEDURE — 85730 THROMBOPLASTIN TIME PARTIAL: CPT | Performed by: EMERGENCY MEDICINE

## 2023-11-25 PROCEDURE — 93005 ELECTROCARDIOGRAM TRACING: CPT | Performed by: EMERGENCY MEDICINE

## 2023-11-25 PROCEDURE — 93458 L HRT ARTERY/VENTRICLE ANGIO: CPT | Performed by: INTERNAL MEDICINE

## 2023-11-25 PROCEDURE — 85025 COMPLETE CBC W/AUTO DIFF WBC: CPT | Performed by: EMERGENCY MEDICINE

## 2023-11-25 PROCEDURE — 02H63JZ INSERTION OF PACEMAKER LEAD INTO RIGHT ATRIUM, PERCUTANEOUS APPROACH: ICD-10-PCS | Performed by: INTERNAL MEDICINE

## 2023-11-25 PROCEDURE — 83880 ASSAY OF NATRIURETIC PEPTIDE: CPT | Performed by: SURGERY

## 2023-11-25 PROCEDURE — 36415 COLL VENOUS BLD VENIPUNCTURE: CPT

## 2023-11-25 PROCEDURE — C1760 CLOSURE DEV, VASC: HCPCS | Performed by: INTERNAL MEDICINE

## 2023-11-25 PROCEDURE — 82962 GLUCOSE BLOOD TEST: CPT

## 2023-11-25 PROCEDURE — 93005 ELECTROCARDIOGRAM TRACING: CPT | Performed by: SURGERY

## 2023-11-25 PROCEDURE — C1894 INTRO/SHEATH, NON-LASER: HCPCS | Performed by: INTERNAL MEDICINE

## 2023-11-25 PROCEDURE — 99153 MOD SED SAME PHYS/QHP EA: CPT | Performed by: INTERNAL MEDICINE

## 2023-11-25 PROCEDURE — 10002801 HB RX 250 W/O HCPCS: Performed by: NURSE PRACTITIONER

## 2023-11-25 PROCEDURE — 83735 ASSAY OF MAGNESIUM: CPT | Performed by: EMERGENCY MEDICINE

## 2023-11-25 PROCEDURE — 13003289 HB OXYGEN THERAPY DAILY

## 2023-11-25 PROCEDURE — 10002801 HB RX 250 W/O HCPCS: Performed by: INTERNAL MEDICINE

## 2023-11-25 PROCEDURE — 83735 ASSAY OF MAGNESIUM: CPT | Performed by: SURGERY

## 2023-11-25 PROCEDURE — 10006023 HB SUPPLY 272: Performed by: INTERNAL MEDICINE

## 2023-11-25 PROCEDURE — 84100 ASSAY OF PHOSPHORUS: CPT | Performed by: SURGERY

## 2023-11-25 PROCEDURE — 33208 INSRT HEART PM ATRIAL & VENT: CPT | Performed by: INTERNAL MEDICINE

## 2023-11-25 PROCEDURE — C1769 GUIDE WIRE: HCPCS | Performed by: INTERNAL MEDICINE

## 2023-11-25 PROCEDURE — B211YZZ FLUOROSCOPY OF MULTIPLE CORONARY ARTERIES USING OTHER CONTRAST: ICD-10-PCS | Performed by: INTERNAL MEDICINE

## 2023-11-25 PROCEDURE — 10006027 HB SUPPLY 278: Performed by: INTERNAL MEDICINE

## 2023-11-25 PROCEDURE — 71045 X-RAY EXAM CHEST 1 VIEW: CPT

## 2023-11-25 PROCEDURE — 10006031 HB ROOM CHARGE TELEMETRY

## 2023-11-25 PROCEDURE — 80048 BASIC METABOLIC PNL TOTAL CA: CPT | Performed by: SURGERY

## 2023-11-25 PROCEDURE — 80061 LIPID PANEL: CPT | Performed by: SURGERY

## 2023-11-25 PROCEDURE — 85025 COMPLETE CBC W/AUTO DIFF WBC: CPT | Performed by: SURGERY

## 2023-11-25 PROCEDURE — 33210 INSERT ELECTRD/PM CATH SNGL: CPT | Performed by: INTERNAL MEDICINE

## 2023-11-25 PROCEDURE — 84484 ASSAY OF TROPONIN QUANT: CPT | Performed by: EMERGENCY MEDICINE

## 2023-11-25 PROCEDURE — 0JH607Z INSERTION OF CARDIAC RESYNCHRONIZATION PACEMAKER PULSE GENERATOR INTO CHEST SUBCUTANEOUS TISSUE AND FASCIA, OPEN APPROACH: ICD-10-PCS | Performed by: INTERNAL MEDICINE

## 2023-11-25 PROCEDURE — 4A023N7 MEASUREMENT OF CARDIAC SAMPLING AND PRESSURE, LEFT HEART, PERCUTANEOUS APPROACH: ICD-10-PCS | Performed by: INTERNAL MEDICINE

## 2023-11-25 PROCEDURE — 10002803 HB RX 637: Performed by: INTERNAL MEDICINE

## 2023-11-25 PROCEDURE — 85610 PROTHROMBIN TIME: CPT | Performed by: EMERGENCY MEDICINE

## 2023-11-25 DEVICE — PACEMAKER
Type: IMPLANTABLE DEVICE | Site: CHEST | Status: FUNCTIONAL
Brand: ACCOLADE™ MRI EL DR

## 2023-11-25 DEVICE — PACE/SENSE LEAD
Type: IMPLANTABLE DEVICE | Site: VENTRICLE | Status: FUNCTIONAL
Brand: INGEVITY™+

## 2023-11-25 DEVICE — MYNXGRIP 6F/7F
Type: IMPLANTABLE DEVICE | Site: GROIN | Status: FUNCTIONAL
Brand: MYNXGRIP

## 2023-11-25 DEVICE — STEROX BIPOLAR IS-1 ATRIAL/VENTRICULAR
Type: IMPLANTABLE DEVICE | Site: ATRIUM | Status: FUNCTIONAL
Brand: FINELINE® II EZ STEROX

## 2023-11-25 RX ORDER — MONTELUKAST SODIUM 10 MG/1
10 TABLET ORAL NIGHTLY
COMMUNITY

## 2023-11-25 RX ORDER — CEFAZOLIN SODIUM 1 G/3ML
INJECTION, POWDER, FOR SOLUTION INTRAMUSCULAR; INTRAVENOUS PRN
Status: DISCONTINUED | OUTPATIENT
Start: 2023-11-25 | End: 2023-11-25 | Stop reason: HOSPADM

## 2023-11-25 RX ORDER — EZETIMIBE 10 MG/1
10 TABLET ORAL NIGHTLY
COMMUNITY

## 2023-11-25 RX ORDER — MIDAZOLAM HYDROCHLORIDE 1 MG/ML
INJECTION, SOLUTION INTRAMUSCULAR; INTRAVENOUS PRN
Status: DISCONTINUED | OUTPATIENT
Start: 2023-11-25 | End: 2023-11-25 | Stop reason: HOSPADM

## 2023-11-25 RX ORDER — NITROGLYCERIN 0.4 MG/1
0.4 TABLET SUBLINGUAL EVERY 5 MIN PRN
Status: DISCONTINUED | OUTPATIENT
Start: 2023-11-25 | End: 2023-11-27 | Stop reason: HOSPADM

## 2023-11-25 RX ORDER — HEPARIN SODIUM 5000 [USP'U]/ML
5000 INJECTION, SOLUTION INTRAVENOUS; SUBCUTANEOUS EVERY 8 HOURS SCHEDULED
Status: DISCONTINUED | OUTPATIENT
Start: 2023-11-26 | End: 2023-11-27 | Stop reason: HOSPADM

## 2023-11-25 RX ORDER — ONDANSETRON 2 MG/ML
4 INJECTION INTRAMUSCULAR; INTRAVENOUS EVERY 12 HOURS PRN
Status: DISCONTINUED | OUTPATIENT
Start: 2023-11-25 | End: 2023-11-27 | Stop reason: HOSPADM

## 2023-11-25 RX ORDER — LIDOCAINE HYDROCHLORIDE 20 MG/ML
INJECTION, SOLUTION EPIDURAL; INFILTRATION; INTRACAUDAL; PERINEURAL PRN
Status: DISCONTINUED | OUTPATIENT
Start: 2023-11-25 | End: 2023-11-25 | Stop reason: HOSPADM

## 2023-11-25 RX ORDER — BISACODYL 10 MG
10 SUPPOSITORY, RECTAL RECTAL DAILY PRN
Status: DISCONTINUED | OUTPATIENT
Start: 2023-11-25 | End: 2023-11-27 | Stop reason: HOSPADM

## 2023-11-25 RX ORDER — MECLIZINE HCL 12.5 MG/1
12.5 TABLET ORAL 3 TIMES DAILY PRN
Status: ON HOLD | COMMUNITY
End: 2023-11-27 | Stop reason: HOSPADM

## 2023-11-25 RX ORDER — SODIUM CHLORIDE 9 MG/ML
INJECTION, SOLUTION INTRAVENOUS CONTINUOUS PRN
Status: DISCONTINUED | OUTPATIENT
Start: 2023-11-25 | End: 2023-11-27 | Stop reason: HOSPADM

## 2023-11-25 RX ORDER — ASPIRIN 81 MG/1
81 TABLET ORAL DAILY
COMMUNITY

## 2023-11-25 RX ORDER — POLYETHYLENE GLYCOL 3350 17 G/17G
17 POWDER, FOR SOLUTION ORAL DAILY PRN
Status: DISCONTINUED | OUTPATIENT
Start: 2023-11-25 | End: 2023-11-27 | Stop reason: HOSPADM

## 2023-11-25 RX ORDER — 0.9 % SODIUM CHLORIDE 0.9 %
2 VIAL (ML) INJECTION EVERY 12 HOURS SCHEDULED
Status: DISCONTINUED | OUTPATIENT
Start: 2023-11-25 | End: 2023-11-27 | Stop reason: HOSPADM

## 2023-11-25 RX ORDER — HYDRALAZINE HYDROCHLORIDE 20 MG/ML
10 INJECTION INTRAMUSCULAR; INTRAVENOUS EVERY 6 HOURS PRN
Status: DISCONTINUED | OUTPATIENT
Start: 2023-11-25 | End: 2023-11-27 | Stop reason: HOSPADM

## 2023-11-25 RX ORDER — ACETAMINOPHEN 500 MG
1000 TABLET ORAL EVERY 6 HOURS PRN
Status: DISCONTINUED | OUTPATIENT
Start: 2023-11-25 | End: 2023-11-27 | Stop reason: HOSPADM

## 2023-11-25 RX ORDER — HEPARIN SODIUM 5000 [USP'U]/ML
5000 INJECTION, SOLUTION INTRAVENOUS; SUBCUTANEOUS EVERY 8 HOURS SCHEDULED
Status: DISCONTINUED | OUTPATIENT
Start: 2023-11-25 | End: 2023-11-25

## 2023-11-25 RX ORDER — ATORVASTATIN CALCIUM 20 MG/1
20 TABLET, FILM COATED ORAL NIGHTLY
Status: DISCONTINUED | OUTPATIENT
Start: 2023-11-25 | End: 2023-11-27 | Stop reason: HOSPADM

## 2023-11-25 RX ORDER — AMOXICILLIN 250 MG
2 CAPSULE ORAL DAILY PRN
Status: DISCONTINUED | OUTPATIENT
Start: 2023-11-25 | End: 2023-11-27 | Stop reason: HOSPADM

## 2023-11-25 RX ORDER — ASPIRIN 81 MG/1
324 TABLET, CHEWABLE ORAL ONCE
Status: DISCONTINUED | OUTPATIENT
Start: 2023-11-25 | End: 2023-11-25

## 2023-11-25 RX ORDER — POTASSIUM CHLORIDE 20 MEQ/1
40 TABLET, EXTENDED RELEASE ORAL ONCE
Status: COMPLETED | OUTPATIENT
Start: 2023-11-25 | End: 2023-11-25

## 2023-11-25 RX ORDER — MAGNESIUM HYDROXIDE/ALUMINUM HYDROXICE/SIMETHICONE 120; 1200; 1200 MG/30ML; MG/30ML; MG/30ML
30 SUSPENSION ORAL EVERY 4 HOURS PRN
Status: DISCONTINUED | OUTPATIENT
Start: 2023-11-25 | End: 2023-11-27 | Stop reason: HOSPADM

## 2023-11-25 RX ORDER — HEPARIN SODIUM 1000 [USP'U]/ML
INJECTION, SOLUTION INTRAVENOUS; SUBCUTANEOUS PRN
Status: DISCONTINUED | OUTPATIENT
Start: 2023-11-25 | End: 2023-11-25 | Stop reason: HOSPADM

## 2023-11-25 RX ORDER — MONTELUKAST SODIUM 10 MG/1
10 TABLET ORAL EVERY EVENING
Status: DISCONTINUED | OUTPATIENT
Start: 2023-11-25 | End: 2023-11-27 | Stop reason: HOSPADM

## 2023-11-25 RX ORDER — CHLORAL HYDRATE 500 MG
1 CAPSULE ORAL DAILY
Status: ON HOLD | COMMUNITY
End: 2023-11-27 | Stop reason: HOSPADM

## 2023-11-25 RX ORDER — ONDANSETRON 4 MG/1
4 TABLET, ORALLY DISINTEGRATING ORAL EVERY 12 HOURS PRN
Status: DISCONTINUED | OUTPATIENT
Start: 2023-11-25 | End: 2023-11-27 | Stop reason: HOSPADM

## 2023-11-25 RX ADMIN — DIBASIC SODIUM PHOSPHATE, MONOBASIC POTASSIUM PHOSPHATE AND MONOBASIC SODIUM PHOSPHATE 250 MG: 852; 155; 130 TABLET ORAL at 15:41

## 2023-11-25 RX ADMIN — CEFAZOLIN SODIUM 2000 MG: 300 INJECTION, POWDER, LYOPHILIZED, FOR SOLUTION INTRAVENOUS at 21:22

## 2023-11-25 RX ADMIN — MONTELUKAST SODIUM 10 MG: 10 TABLET, FILM COATED ORAL at 18:17

## 2023-11-25 RX ADMIN — POTASSIUM CHLORIDE 40 MEQ: 1500 TABLET, EXTENDED RELEASE ORAL at 11:06

## 2023-11-25 RX ADMIN — PERFLUTREN 2 ML: 6.52 INJECTION, SUSPENSION INTRAVENOUS at 09:19

## 2023-11-25 RX ADMIN — HYDRALAZINE HYDROCHLORIDE 10 MG: 20 INJECTION, SOLUTION INTRAMUSCULAR; INTRAVENOUS at 04:56

## 2023-11-25 RX ADMIN — DIBASIC SODIUM PHOSPHATE, MONOBASIC POTASSIUM PHOSPHATE AND MONOBASIC SODIUM PHOSPHATE 250 MG: 852; 155; 130 TABLET ORAL at 11:06

## 2023-11-25 RX ADMIN — ATORVASTATIN CALCIUM 20 MG: 20 TABLET, FILM COATED ORAL at 19:50

## 2023-11-25 RX ADMIN — CEFAZOLIN SODIUM 2000 MG: 300 INJECTION, POWDER, LYOPHILIZED, FOR SOLUTION INTRAVENOUS at 15:41

## 2023-11-25 SDOH — ECONOMIC STABILITY: TRANSPORTATION INSECURITY
IN THE PAST 12 MONTHS, HAS THE LACK OF TRANSPORTATION KEPT YOU FROM MEDICAL APPOINTMENTS OR FROM GETTING MEDICATIONS?: NO

## 2023-11-25 SDOH — ECONOMIC STABILITY: TRANSPORTATION INSECURITY
IN THE PAST 12 MONTHS, HAS LACK OF TRANSPORTATION KEPT YOU FROM MEETINGS, WORK, OR FROM GETTING THINGS NEEDED FOR DAILY LIVING?: NO

## 2023-11-25 SDOH — ECONOMIC STABILITY: FOOD INSECURITY: HOW OFTEN IN THE PAST 12 MONTHS WERE YOU WORRIED OR STRESSED ABOUT HAVING ENOUGH MONEY TO BUY NUTRITIOUS MEALS?: NEVER

## 2023-11-25 SDOH — ECONOMIC STABILITY: HOUSING INSECURITY: WHAT IS YOUR LIVING SITUATION TODAY?: SPOUSE

## 2023-11-25 SDOH — ECONOMIC STABILITY: GENERAL

## 2023-11-25 SDOH — SOCIAL STABILITY: SOCIAL NETWORK: SUPPORT SYSTEMS: CHILDREN;FAMILY MEMBERS;SPOUSE;FRIENDS

## 2023-11-25 SDOH — HEALTH STABILITY: GENERAL: BECAUSE OF A PHYSICAL, MENTAL, OR EMOTIONAL CONDITION, DO YOU HAVE DIFFICULTY DOING ERRANDS ALONE?: NO

## 2023-11-25 SDOH — SOCIAL STABILITY: SOCIAL NETWORK
HOW OFTEN DO YOU SEE OR TALK TO PEOPLE THAT YOU CARE ABOUT AND FEEL CLOSE TO? (FOR EXAMPLE: TALKING TO FRIENDS ON THE PHONE, VISITING FRIENDS OR FAMILY, GOING TO CHURCH OR CLUB MEETINGS): 5 OR MORE TIMES A WEEK

## 2023-11-25 SDOH — HEALTH STABILITY: GENERAL
BECAUSE OF A PHYSICAL, MENTAL, OR EMOTIONAL CONDITION, DO YOU HAVE SERIOUS DIFFICULTY CONCENTRATING, REMEMBERING OR MAKING DECISIONS?: NO

## 2023-11-25 SDOH — ECONOMIC STABILITY: HOUSING INSECURITY: WHAT IS YOUR LIVING SITUATION TODAY?: HOUSE

## 2023-11-25 SDOH — HEALTH STABILITY: PHYSICAL HEALTH: DO YOU HAVE DIFFICULTY DRESSING OR BATHING?: NO

## 2023-11-25 SDOH — ECONOMIC STABILITY: HOUSING INSECURITY: ARE YOU WORRIED ABOUT LOSING YOUR HOUSING?: NO

## 2023-11-25 SDOH — HEALTH STABILITY: PHYSICAL HEALTH: DO YOU HAVE SERIOUS DIFFICULTY WALKING OR CLIMBING STAIRS?: NO

## 2023-11-25 ASSESSMENT — ACTIVITIES OF DAILY LIVING (ADL)
ADL_SHORT_OF_BREATH: NO
RECENT_DECLINE_ADL: NO
ADL_SCORE: 12
ADL_BEFORE_ADMISSION: INDEPENDENT

## 2023-11-25 ASSESSMENT — ENCOUNTER SYMPTOMS
NAUSEA: 0
COUGH: 0
ABDOMINAL PAIN: 0
SHORTNESS OF BREATH: 0
FEVER: 0
AGITATION: 0
VOMITING: 0
HEADACHES: 0
CHILLS: 0
SORE THROAT: 0

## 2023-11-25 ASSESSMENT — LIFESTYLE VARIABLES
SMOKING_HISTORY: NO
HOW OFTEN DO YOU HAVE A DRINK CONTAINING ALCOHOL: NEVER
HOW MANY STANDARD DRINKS CONTAINING ALCOHOL DO YOU HAVE ON A TYPICAL DAY: 0,1 OR 2
AUDIT-C TOTAL SCORE: 0
ALCOHOL_USE_STATUS: NO OR LOW RISK WITH VALIDATED TOOL
HOW OFTEN DO YOU HAVE 6 OR MORE DRINKS ON ONE OCCASION: NEVER

## 2023-11-25 ASSESSMENT — PATIENT HEALTH QUESTIONNAIRE - PHQ9
1. LITTLE INTEREST OR PLEASURE IN DOING THINGS: SEVERAL DAYS
CLINICAL INTERPRETATION OF PHQ2 SCORE: NO FURTHER SCREENING NEEDED
SUM OF ALL RESPONSES TO PHQ9 QUESTIONS 1 AND 2: 1
SUM OF ALL RESPONSES TO PHQ9 QUESTIONS 1 AND 2: 1
2. FEELING DOWN, DEPRESSED OR HOPELESS: NOT AT ALL
IS PATIENT ABLE TO COMPLETE PHQ2 OR PHQ9: YES

## 2023-11-25 ASSESSMENT — ORIENTATION MEMORY CONCENTRATION TEST (OMCT)
WHAT TIME IS IT (NO WATCH OR CLOCK): CORRECT
OMCT SCORE: 0
REPEAT THE NAME AND ADDRESS I ASKED YOU TO REMEMBER: CORRECT
WHAT MONTH IS IT NOW: CORRECT
SAY THE MONTHS IN REVERSE ORDER STARTING WITH LAST MONTH: CORRECT
OMCT INTERPRETATION: 0-6: NO SIGNIFICANT IMPAIRMENT
WHAT YEAR IS IT NOW (MUST BE EXACT): CORRECT
COUNT BACKWARDS FROM 20 TO 1: CORRECT

## 2023-11-25 ASSESSMENT — COLUMBIA-SUICIDE SEVERITY RATING SCALE - C-SSRS
1. WITHIN THE PAST MONTH, HAVE YOU WISHED YOU WERE DEAD OR WISHED YOU COULD GO TO SLEEP AND NOT WAKE UP?: NO
6. HAVE YOU EVER DONE ANYTHING, STARTED TO DO ANYTHING, OR PREPARED TO DO ANYTHING TO END YOUR LIFE?: NO
2. HAVE YOU ACTUALLY HAD ANY THOUGHTS OF KILLING YOURSELF?: NO
IS THE PATIENT ABLE TO COMPLETE C-SSRS: YES

## 2023-11-25 ASSESSMENT — PAIN SCALES - GENERAL
PAINLEVEL_OUTOF10: 0

## 2023-11-26 ENCOUNTER — APPOINTMENT (OUTPATIENT)
Dept: GENERAL RADIOLOGY | Age: 73
End: 2023-11-26
Attending: INTERNAL MEDICINE

## 2023-11-26 LAB
ANION GAP SERPL CALC-SCNC: 9 MMOL/L (ref 7–19)
BASOPHILS # BLD: 0.1 K/MCL (ref 0–0.3)
BASOPHILS NFR BLD: 0 %
BUN SERPL-MCNC: 13 MG/DL (ref 6–20)
BUN/CREAT SERPL: 13 (ref 7–25)
CALCIUM SERPL-MCNC: 10 MG/DL (ref 8.4–10.2)
CHLORIDE SERPL-SCNC: 105 MMOL/L (ref 97–110)
CO2 SERPL-SCNC: 27 MMOL/L (ref 21–32)
CREAT SERPL-MCNC: 0.99 MG/DL (ref 0.67–1.17)
DEPRECATED RDW RBC: 48.2 FL (ref 39–50)
EGFRCR SERPLBLD CKD-EPI 2021: 79 ML/MIN/{1.73_M2}
EOSINOPHIL # BLD: 0.2 K/MCL (ref 0–0.5)
EOSINOPHIL NFR BLD: 1 %
ERYTHROCYTE [DISTWIDTH] IN BLOOD: 13.9 % (ref 11–15)
FASTING DURATION TIME PATIENT: ABNORMAL H
GLUCOSE SERPL-MCNC: 136 MG/DL (ref 70–99)
HCT VFR BLD CALC: 45.3 % (ref 39–51)
HGB BLD-MCNC: 14.7 G/DL (ref 13–17)
IMM GRANULOCYTES # BLD AUTO: 0.1 K/MCL (ref 0–0.2)
IMM GRANULOCYTES # BLD: 1 %
LYMPHOCYTES # BLD: 2.1 K/MCL (ref 1–4)
LYMPHOCYTES NFR BLD: 18 %
MAGNESIUM SERPL-MCNC: 2.2 MG/DL (ref 1.7–2.4)
MCH RBC QN AUTO: 30.3 PG (ref 26–34)
MCHC RBC AUTO-ENTMCNC: 32.5 G/DL (ref 32–36.5)
MCV RBC AUTO: 93.4 FL (ref 78–100)
MONOCYTES # BLD: 0.9 K/MCL (ref 0.3–0.9)
MONOCYTES NFR BLD: 8 %
NEUTROPHILS # BLD: 8.5 K/MCL (ref 1.8–7.7)
NEUTROPHILS NFR BLD: 72 %
NRBC BLD MANUAL-RTO: 0 /100 WBC
PHOSPHATE SERPL-MCNC: 3.1 MG/DL (ref 2.4–4.7)
PLATELET # BLD AUTO: 150 K/MCL (ref 140–450)
POTASSIUM SERPL-SCNC: 4 MMOL/L (ref 3.4–5.1)
RBC # BLD: 4.85 MIL/MCL (ref 4.5–5.9)
SODIUM SERPL-SCNC: 137 MMOL/L (ref 135–145)
WBC # BLD: 11.8 K/MCL (ref 4.2–11)

## 2023-11-26 PROCEDURE — 84100 ASSAY OF PHOSPHORUS: CPT | Performed by: SURGERY

## 2023-11-26 PROCEDURE — 10002800 HB RX 250 W HCPCS: Performed by: INTERNAL MEDICINE

## 2023-11-26 PROCEDURE — 96372 THER/PROPH/DIAG INJ SC/IM: CPT | Performed by: INTERNAL MEDICINE

## 2023-11-26 PROCEDURE — 10002801 HB RX 250 W/O HCPCS: Performed by: NURSE PRACTITIONER

## 2023-11-26 PROCEDURE — 10004651 HB RX, NO CHARGE ITEM: Performed by: SURGERY

## 2023-11-26 PROCEDURE — 83735 ASSAY OF MAGNESIUM: CPT | Performed by: SURGERY

## 2023-11-26 PROCEDURE — 85025 COMPLETE CBC W/AUTO DIFF WBC: CPT | Performed by: SURGERY

## 2023-11-26 PROCEDURE — 10002800 HB RX 250 W HCPCS: Performed by: SURGERY

## 2023-11-26 PROCEDURE — 10002803 HB RX 637: Performed by: INTERNAL MEDICINE

## 2023-11-26 PROCEDURE — 10006031 HB ROOM CHARGE TELEMETRY

## 2023-11-26 PROCEDURE — 99233 SBSQ HOSP IP/OBS HIGH 50: CPT | Performed by: INTERNAL MEDICINE

## 2023-11-26 PROCEDURE — 36415 COLL VENOUS BLD VENIPUNCTURE: CPT | Performed by: SURGERY

## 2023-11-26 PROCEDURE — 71046 X-RAY EXAM CHEST 2 VIEWS: CPT

## 2023-11-26 PROCEDURE — 80048 BASIC METABOLIC PNL TOTAL CA: CPT | Performed by: SURGERY

## 2023-11-26 RX ORDER — LISINOPRIL 10 MG/1
10 TABLET ORAL DAILY
Status: DISCONTINUED | OUTPATIENT
Start: 2023-11-27 | End: 2023-11-27 | Stop reason: HOSPADM

## 2023-11-26 RX ORDER — POTASSIUM CHLORIDE 20 MEQ/1
40 TABLET, EXTENDED RELEASE ORAL ONCE
Status: COMPLETED | OUTPATIENT
Start: 2023-11-26 | End: 2023-11-26

## 2023-11-26 RX ADMIN — ATORVASTATIN CALCIUM 20 MG: 20 TABLET, FILM COATED ORAL at 21:00

## 2023-11-26 RX ADMIN — HYDRALAZINE HYDROCHLORIDE 10 MG: 20 INJECTION, SOLUTION INTRAMUSCULAR; INTRAVENOUS at 00:06

## 2023-11-26 RX ADMIN — HEPARIN SODIUM 5000 UNITS: 5000 INJECTION INTRAVENOUS; SUBCUTANEOUS at 11:19

## 2023-11-26 RX ADMIN — ACETAMINOPHEN 1000 MG: 500 TABLET ORAL at 15:33

## 2023-11-26 RX ADMIN — HEPARIN SODIUM 5000 UNITS: 5000 INJECTION INTRAVENOUS; SUBCUTANEOUS at 21:00

## 2023-11-26 RX ADMIN — SODIUM CHLORIDE, PRESERVATIVE FREE 2 ML: 5 INJECTION INTRAVENOUS at 21:00

## 2023-11-26 RX ADMIN — MONTELUKAST SODIUM 10 MG: 10 TABLET, FILM COATED ORAL at 18:35

## 2023-11-26 RX ADMIN — POTASSIUM CHLORIDE 40 MEQ: 1500 TABLET, EXTENDED RELEASE ORAL at 08:06

## 2023-11-26 RX ADMIN — HYDRALAZINE HYDROCHLORIDE 10 MG: 20 INJECTION, SOLUTION INTRAMUSCULAR; INTRAVENOUS at 23:00

## 2023-11-26 ASSESSMENT — ENCOUNTER SYMPTOMS
LOSS OF CONSCIOUSNESS: 1
SHORTNESS OF BREATH: 0
PSYCHIATRIC NEGATIVE: 1
EYES NEGATIVE: 1
RESPIRATORY NEGATIVE: 1
DIETARY ISSUES: ADEQUATE INTAKE
ACTIVITY IMPAIRMENT: NORMAL
NAUSEA: 0
CONSTITUTIONAL NEGATIVE: 1
DIZZINESS: 1
GASTROINTESTINAL NEGATIVE: 1

## 2023-11-26 ASSESSMENT — PAIN SCALES - GENERAL
PAINLEVEL_OUTOF10: 0
PAINLEVEL_OUTOF10: 8
PAINLEVEL_OUTOF10: 2
PAINLEVEL_OUTOF10: 0
PAINLEVEL_OUTOF10: 0

## 2023-11-27 ENCOUNTER — MED REC SCAN ONLY (OUTPATIENT)
Dept: INTERNAL MEDICINE CLINIC | Facility: CLINIC | Age: 73
End: 2023-11-27

## 2023-11-27 VITALS
OXYGEN SATURATION: 98 % | HEIGHT: 69 IN | BODY MASS INDEX: 24.42 KG/M2 | DIASTOLIC BLOOD PRESSURE: 63 MMHG | TEMPERATURE: 98.1 F | SYSTOLIC BLOOD PRESSURE: 100 MMHG | HEART RATE: 86 BPM | WEIGHT: 164.9 LBS | RESPIRATION RATE: 15 BRPM

## 2023-11-27 LAB
ANION GAP SERPL CALC-SCNC: 9 MMOL/L (ref 7–19)
BASOPHILS # BLD: 0.1 K/MCL (ref 0–0.3)
BASOPHILS NFR BLD: 1 %
BUN SERPL-MCNC: 15 MG/DL (ref 6–20)
BUN/CREAT SERPL: 17 (ref 7–25)
CALCIUM SERPL-MCNC: 10.1 MG/DL (ref 8.4–10.2)
CHLORIDE SERPL-SCNC: 106 MMOL/L (ref 97–110)
CO2 SERPL-SCNC: 27 MMOL/L (ref 21–32)
CREAT SERPL-MCNC: 0.88 MG/DL (ref 0.67–1.17)
DEPRECATED RDW RBC: 47.8 FL (ref 39–50)
EGFRCR SERPLBLD CKD-EPI 2021: 90 ML/MIN/{1.73_M2}
EOSINOPHIL # BLD: 0.2 K/MCL (ref 0–0.5)
EOSINOPHIL NFR BLD: 2 %
ERYTHROCYTE [DISTWIDTH] IN BLOOD: 13.9 % (ref 11–15)
FASTING DURATION TIME PATIENT: ABNORMAL H
GLUCOSE SERPL-MCNC: 115 MG/DL (ref 70–99)
HCT VFR BLD CALC: 46.9 % (ref 39–51)
HGB BLD-MCNC: 15.1 G/DL (ref 13–17)
IMM GRANULOCYTES # BLD AUTO: 0.1 K/MCL (ref 0–0.2)
IMM GRANULOCYTES # BLD: 1 %
LACTATE BLDV-SCNC: 1.2 MMOL/L (ref 0–2)
LYMPHOCYTES # BLD: 2.1 K/MCL (ref 1–4)
LYMPHOCYTES NFR BLD: 17 %
MAGNESIUM SERPL-MCNC: 2.3 MG/DL (ref 1.7–2.4)
MCH RBC QN AUTO: 30.2 PG (ref 26–34)
MCHC RBC AUTO-ENTMCNC: 32.2 G/DL (ref 32–36.5)
MCV RBC AUTO: 93.8 FL (ref 78–100)
MONOCYTES # BLD: 1 K/MCL (ref 0.3–0.9)
MONOCYTES NFR BLD: 8 %
NEUTROPHILS # BLD: 8.7 K/MCL (ref 1.8–7.7)
NEUTROPHILS NFR BLD: 71 %
NRBC BLD MANUAL-RTO: 0 /100 WBC
PHOSPHATE SERPL-MCNC: 2.9 MG/DL (ref 2.4–4.7)
PLATELET # BLD AUTO: 150 K/MCL (ref 140–450)
POTASSIUM SERPL-SCNC: 4.4 MMOL/L (ref 3.4–5.1)
RBC # BLD: 5 MIL/MCL (ref 4.5–5.9)
SODIUM SERPL-SCNC: 138 MMOL/L (ref 135–145)
WBC # BLD: 12.1 K/MCL (ref 4.2–11)

## 2023-11-27 PROCEDURE — 10002800 HB RX 250 W HCPCS: Performed by: INTERNAL MEDICINE

## 2023-11-27 PROCEDURE — 36415 COLL VENOUS BLD VENIPUNCTURE: CPT | Performed by: SURGERY

## 2023-11-27 PROCEDURE — 99233 SBSQ HOSP IP/OBS HIGH 50: CPT | Performed by: INTERNAL MEDICINE

## 2023-11-27 PROCEDURE — 83735 ASSAY OF MAGNESIUM: CPT | Performed by: SURGERY

## 2023-11-27 PROCEDURE — 10002803 HB RX 637: Performed by: INTERNAL MEDICINE

## 2023-11-27 PROCEDURE — 84100 ASSAY OF PHOSPHORUS: CPT | Performed by: SURGERY

## 2023-11-27 PROCEDURE — 85025 COMPLETE CBC W/AUTO DIFF WBC: CPT | Performed by: SURGERY

## 2023-11-27 PROCEDURE — 10004651 HB RX, NO CHARGE ITEM: Performed by: SURGERY

## 2023-11-27 PROCEDURE — 96372 THER/PROPH/DIAG INJ SC/IM: CPT | Performed by: INTERNAL MEDICINE

## 2023-11-27 PROCEDURE — 83605 ASSAY OF LACTIC ACID: CPT | Performed by: INTERNAL MEDICINE

## 2023-11-27 PROCEDURE — 80048 BASIC METABOLIC PNL TOTAL CA: CPT | Performed by: SURGERY

## 2023-11-27 RX ORDER — ATORVASTATIN CALCIUM 20 MG/1
20 TABLET, FILM COATED ORAL DAILY
Qty: 30 TABLET | Refills: 3 | Status: SHIPPED | OUTPATIENT
Start: 2023-11-27

## 2023-11-27 RX ORDER — LISINOPRIL 10 MG/1
10 TABLET ORAL DAILY
Qty: 30 TABLET | Refills: 11 | Status: SHIPPED | OUTPATIENT
Start: 2023-11-28 | End: 2024-11-27

## 2023-11-27 RX ADMIN — HEPARIN SODIUM 5000 UNITS: 5000 INJECTION INTRAVENOUS; SUBCUTANEOUS at 06:04

## 2023-11-27 RX ADMIN — LISINOPRIL 10 MG: 10 TABLET ORAL at 08:44

## 2023-11-27 RX ADMIN — SODIUM CHLORIDE, PRESERVATIVE FREE 2 ML: 5 INJECTION INTRAVENOUS at 08:44

## 2023-11-27 ASSESSMENT — COGNITIVE AND FUNCTIONAL STATUS - GENERAL
DO YOU HAVE SERIOUS DIFFICULTY WALKING OR CLIMBING STAIRS: NO
DO YOU HAVE DIFFICULTY DRESSING OR BATHING: NO
BECAUSE OF A PHYSICAL, MENTAL, OR EMOTIONAL CONDITION, DO YOU HAVE DIFFICULTY DOING ERRANDS ALONE: NO
BECAUSE OF A PHYSICAL, MENTAL, OR EMOTIONAL CONDITION, DO YOU HAVE SERIOUS DIFFICULTY CONCENTRATING, REMEMBERING OR MAKING DECISIONS: NO

## 2023-11-27 ASSESSMENT — PAIN SCALES - GENERAL: PAINLEVEL_OUTOF10: 0

## 2023-12-04 ENCOUNTER — TELEPHONE (OUTPATIENT)
Dept: INTERNAL MEDICINE CLINIC | Facility: CLINIC | Age: 73
End: 2023-12-04

## 2023-12-04 NOTE — TELEPHONE ENCOUNTER
YISEL: Dr. Suzanne Kearns    Patient called reporting he had a 911 call for syncope and was seen in [facility]--> admission for Third degree heart block. He also states he saw ENT in [facility] for vertigo recently. He is wondering what the next steps are. He was advised to schedule visit with PCP. Scheduled. Patient verbalized understanding. No further questions or concerns at this time.     Future Appointments   Date Time Provider Angel Romero   12/8/2023  9:30 AM Malachi Pyle MD [facility]   2/6/2024  9:00 AM Cyndi Macedo [facility]

## 2023-12-06 ENCOUNTER — TELEPHONE (OUTPATIENT)
Dept: INTERNAL MEDICINE CLINIC | Facility: CLINIC | Age: 73
End: 2023-12-06

## 2023-12-06 NOTE — TELEPHONE ENCOUNTER
Left message in regards to Dr. Damien Hearn message. Patient advised to call office with any questions.

## 2023-12-06 NOTE — TELEPHONE ENCOUNTER
Patient states that he is at a dental appointment this morning and he is not able to get dental work done of deep teeth cleaning and patient also needs to get dental xrays done, as they need to get clearance from our office, as the patient recently had a pacemaker put in about 10 days ago. Patient is requesting to be seen sooner then first available. Patient states that he is at [facility] - Dr John Aly - phone # [phone]. Patient states that they will be faxing over the surgery clearance form to our office.

## 2023-12-08 ENCOUNTER — OFFICE VISIT (OUTPATIENT)
Dept: INTERNAL MEDICINE CLINIC | Facility: CLINIC | Age: 73
End: 2023-12-08
Payer: COMMERCIAL

## 2023-12-08 VITALS
SYSTOLIC BLOOD PRESSURE: 121 MMHG | HEART RATE: 79 BPM | BODY MASS INDEX: 24.88 KG/M2 | OXYGEN SATURATION: 96 % | HEIGHT: 69 IN | DIASTOLIC BLOOD PRESSURE: 73 MMHG | WEIGHT: 168 LBS

## 2023-12-08 DIAGNOSIS — I44.2 THIRD DEGREE HEART BLOCK (HCC): Primary | ICD-10-CM

## 2023-12-08 DIAGNOSIS — M26.623 BILATERAL TEMPOROMANDIBULAR JOINT PAIN: ICD-10-CM

## 2023-12-08 DIAGNOSIS — Z95.0 STATUS POST PLACEMENT OF CARDIAC PACEMAKER: ICD-10-CM

## 2023-12-08 PROCEDURE — 3008F BODY MASS INDEX DOCD: CPT | Performed by: INTERNAL MEDICINE

## 2023-12-08 PROCEDURE — 3074F SYST BP LT 130 MM HG: CPT | Performed by: INTERNAL MEDICINE

## 2023-12-08 PROCEDURE — 99214 OFFICE O/P EST MOD 30 MIN: CPT | Performed by: INTERNAL MEDICINE

## 2023-12-08 PROCEDURE — 1111F DSCHRG MED/CURRENT MED MERGE: CPT | Performed by: INTERNAL MEDICINE

## 2023-12-08 PROCEDURE — 3078F DIAST BP <80 MM HG: CPT | Performed by: INTERNAL MEDICINE

## 2023-12-08 RX ORDER — LISINOPRIL 10 MG/1
10 TABLET ORAL DAILY
COMMUNITY
Start: 2023-11-28 | End: 2024-11-27

## 2023-12-08 RX ORDER — ATORVASTATIN CALCIUM 20 MG/1
20 TABLET, FILM COATED ORAL DAILY
COMMUNITY
Start: 2023-11-27

## 2023-12-08 RX ORDER — ASPIRIN 81 MG/1
81 TABLET ORAL DAILY
COMMUNITY

## 2023-12-12 NOTE — TELEPHONE ENCOUNTER
GI Staff:     Please schedule: Colonoscopy with MAC     Please send SPLIT dose Golytely bowel prep. Diagnosis: Screening    Medication adjustments:  Day before procedure, hold: NONE  Day of procedure, hold: NONE    >>>Please inform patient if new medications are started after scheduling procedure they need to call clinic to notify us. GI RNs:  -please obtain cardiac clearance from Dr. Malka Darden.

## 2023-12-13 ENCOUNTER — ANCILLARY PROCEDURE (OUTPATIENT)
Dept: CARDIOLOGY | Age: 73
End: 2023-12-13
Attending: NURSE PRACTITIONER

## 2023-12-13 VITALS — HEART RATE: 80 BPM

## 2023-12-13 DIAGNOSIS — Z95.0 PRESENCE OF CARDIAC PACEMAKER: ICD-10-CM

## 2023-12-13 DIAGNOSIS — I44.2 COMPLETE HEART BLOCK (CMD): ICD-10-CM

## 2023-12-13 LAB
MDC_IDC_LEAD_IMPLANT_DT: NORMAL
MDC_IDC_LEAD_IMPLANT_DT: NORMAL
MDC_IDC_LEAD_LOCATION: NORMAL
MDC_IDC_LEAD_LOCATION: NORMAL
MDC_IDC_LEAD_LOCATION_DETAIL_1: NORMAL
MDC_IDC_LEAD_LOCATION_DETAIL_1: NORMAL
MDC_IDC_LEAD_MFG: NORMAL
MDC_IDC_LEAD_MFG: NORMAL
MDC_IDC_LEAD_MODEL: NORMAL
MDC_IDC_LEAD_MODEL: NORMAL
MDC_IDC_LEAD_POLARITY_TYPE: NORMAL
MDC_IDC_LEAD_POLARITY_TYPE: NORMAL
MDC_IDC_LEAD_SERIAL: NORMAL
MDC_IDC_LEAD_SERIAL: NORMAL
MDC_IDC_MSMT_BATTERY_DTM: NORMAL
MDC_IDC_MSMT_BATTERY_REMAINING_LONGEVITY: 180 MO
MDC_IDC_MSMT_LEADCHNL_RA_IMPEDANCE_VALUE: 470 OHM
MDC_IDC_MSMT_LEADCHNL_RA_IMPEDANCE_VALUE: 470 OHM
MDC_IDC_MSMT_LEADCHNL_RA_PACING_THRESHOLD_AMPLITUDE: 0.6 V
MDC_IDC_MSMT_LEADCHNL_RA_PACING_THRESHOLD_PULSEWIDTH: 0.4 MS
MDC_IDC_MSMT_LEADCHNL_RA_SENSING_INTR_AMPL: 5.9 MV
MDC_IDC_MSMT_LEADCHNL_RV_IMPEDANCE_VALUE: 849 OHM
MDC_IDC_MSMT_LEADCHNL_RV_IMPEDANCE_VALUE: 849 OHM
MDC_IDC_MSMT_LEADCHNL_RV_PACING_THRESHOLD_AMPLITUDE: 0.8 V
MDC_IDC_MSMT_LEADCHNL_RV_PACING_THRESHOLD_PULSEWIDTH: 0.4 MS
MDC_IDC_MSMT_LEADCHNL_RV_SENSING_INTR_AMPL: 10.8 MV
MDC_IDC_PG_IMPLANT_DTM: NORMAL
MDC_IDC_PG_MFG: NORMAL
MDC_IDC_PG_MODEL: NORMAL
MDC_IDC_PG_SERIAL: NORMAL
MDC_IDC_PG_TYPE: NORMAL
MDC_IDC_SESS_CLINIC_NAME: NORMAL
MDC_IDC_SESS_DTM: NORMAL
MDC_IDC_SESS_TYPE: NORMAL
MDC_IDC_SET_BRADY_AT_MODE_SWITCH_MODE: NORMAL
MDC_IDC_SET_BRADY_AT_MODE_SWITCH_RATE: 170 {BEATS}/MIN
MDC_IDC_SET_BRADY_HYSTRATE: NORMAL
MDC_IDC_SET_BRADY_LOWRATE: 60 {BEATS}/MIN
MDC_IDC_SET_BRADY_MAX_SENSOR_RATE: 130 {BEATS}/MIN
MDC_IDC_SET_BRADY_MAX_TRACKING_RATE: 130 {BEATS}/MIN
MDC_IDC_SET_BRADY_MODE: NORMAL
MDC_IDC_SET_BRADY_PAV_DELAY_HIGH: 170 MS
MDC_IDC_SET_BRADY_PAV_DELAY_LOW: 180 MS
MDC_IDC_SET_BRADY_SAV_DELAY_HIGH: 140 MS
MDC_IDC_SET_BRADY_SAV_DELAY_LOW: 150 MS
MDC_IDC_SET_LEADCHNL_RA_PACING_AMPLITUDE: 3.5 V
MDC_IDC_SET_LEADCHNL_RA_PACING_CAPTURE_MODE: NORMAL
MDC_IDC_SET_LEADCHNL_RA_PACING_POLARITY: NORMAL
MDC_IDC_SET_LEADCHNL_RA_PACING_PULSEWIDTH: 0.4 MS
MDC_IDC_SET_LEADCHNL_RA_SENSING_ADAPTATION_MODE: NORMAL
MDC_IDC_SET_LEADCHNL_RA_SENSING_POLARITY: NORMAL
MDC_IDC_SET_LEADCHNL_RA_SENSING_SENSITIVITY: 0.75 MV
MDC_IDC_SET_LEADCHNL_RV_PACING_AMPLITUDE: 3.5 V
MDC_IDC_SET_LEADCHNL_RV_PACING_CAPTURE_MODE: NORMAL
MDC_IDC_SET_LEADCHNL_RV_PACING_POLARITY: NORMAL
MDC_IDC_SET_LEADCHNL_RV_PACING_PULSEWIDTH: 0.4 MS
MDC_IDC_SET_LEADCHNL_RV_SENSING_ADAPTATION_MODE: NORMAL
MDC_IDC_SET_LEADCHNL_RV_SENSING_POLARITY: NORMAL
MDC_IDC_SET_LEADCHNL_RV_SENSING_SENSITIVITY: 2.5 MV
MDC_IDC_SET_ZONE_DETECTION_INTERVAL: 375 MS
MDC_IDC_SET_ZONE_TYPE: NORMAL
MDC_IDC_SET_ZONE_VENDOR_TYPE: NORMAL
MDC_IDC_STAT_BRADY_RA_PERCENT_PACED: 3 %
MDC_IDC_STAT_BRADY_RV_PERCENT_PACED: 12 %
MDC_IDC_STAT_EPISODE_RECENT_COUNT: 0
MDC_IDC_STAT_EPISODE_RECENT_COUNT_DTM_END: NORMAL
MDC_IDC_STAT_EPISODE_RECENT_COUNT_DTM_START: NORMAL
MDC_IDC_STAT_EPISODE_TOTAL_COUNT: 0
MDC_IDC_STAT_EPISODE_TOTAL_COUNT_DTM_END: NORMAL
MDC_IDC_STAT_EPISODE_TYPE: NORMAL
MDC_IDC_STAT_EPISODE_TYPE: NORMAL
MDC_IDC_STAT_EPISODE_VENDOR_TYPE: NORMAL
MDC_IDC_STAT_EPISODE_VENDOR_TYPE: NORMAL

## 2023-12-13 NOTE — TELEPHONE ENCOUNTER
Scheduled for:  Colonoscopy [id number]  Provider Name:  Dr Symone Torres  Date:  4/23/2024  Location:  [facility]  Sedation:  MAC  Time:  8:15 am. (pt is aware to arrive at 7:15 am)  Prep:  Split dose Golytely. Bowel prep was sent to pharmacy on file. Meds/Allergies Reconciled?:  yes  Diagnosis with codes:    CRC screening Z12.11  Was patient informed to call insurance with codes (Y/N):  Yes  Referral sent?:  Referral was sent at the time of electronic surgical scheduling. [address] [facility] or [address]  notified?:  I sent an electronic request to Endo Scheduling and received a confirmation today. Medication Orders:  Pt is aware to NOT take iron pills, herbal meds and diet supplements for 7 days before exam. Also to NOT take any form of alcohol, recreational drugs and any forms of ED meds 24 hours before exam.   Misc Orders:  Patient reports he just a pacemaker put on 3 weeks ago at [facility].   -Obtain cardiac clearance from Dr. Ning Moreno. Further instructions given by staff:  I discussed the prep instructions with the patient which he verbally understood. I will send prep instructions via My Chart.

## 2023-12-13 NOTE — TELEPHONE ENCOUNTER
GI Rns,    Patient is scheduled for Colonoscopy procedure on 4/23/2024. Patient reports he just had a Pacemaker put on 3 weeks ago at [facility]. Per Dr Joe Fong note below please:    Obtain cardiac clearance from Dr. Romario Hair. Thank you!

## 2023-12-15 ENCOUNTER — OFFICE VISIT (OUTPATIENT)
Dept: OTOLARYNGOLOGY | Facility: CLINIC | Age: 73
End: 2023-12-15
Payer: COMMERCIAL

## 2023-12-15 DIAGNOSIS — M24.20 EAGLE'S SYNDROME: Primary | ICD-10-CM

## 2023-12-15 PROCEDURE — 99213 OFFICE O/P EST LOW 20 MIN: CPT | Performed by: OTOLARYNGOLOGY

## 2023-12-15 RX ORDER — CYCLOBENZAPRINE HCL 5 MG
5 TABLET ORAL NIGHTLY
Qty: 30 TABLET | Refills: 1 | Status: SHIPPED | OUTPATIENT
Start: 2023-12-15

## 2023-12-15 RX ORDER — MELOXICAM 15 MG/1
15 TABLET ORAL DAILY
Qty: 30 TABLET | Refills: 3 | Status: SHIPPED | OUTPATIENT
Start: 2023-12-15

## 2023-12-16 NOTE — PROGRESS NOTES
Harmony Iqbal is a 74year old male. Chief Complaint   Patient presents with    Throat Problem     Patient is here due to pain in throat x 6 months. Reports pain only when moving his head. Reports no issues swallowing. Tmj Disorder     Patient is here to discuss TMJ related pain. HISTORY OF PRESENT ILLNESS  Presents with throat discomfort in the submental region for the past 6 months. Also has been told in the past is TMJ disorder. Pain occurs in his throat when he turns to the left only. He does admit to a previous problem with significant throat pain with swallowing states that he was seen in [facility] by another doctor started on carbamazepine with complete resolution of his symptoms. Only uses carbamazepine on apparent basis at this point. Was told that he may have an elongated styloid process when he was in [facility]. Had an MRI reportedly this. No other signs, symptoms or complaints.   Sent by Dr. Josephine Evans for my opinion regarding his complaints      Social History     Socioeconomic History    Marital status:    Tobacco Use    Smoking status: Never    Smokeless tobacco: Never   Vaping Use    Vaping Use: Never used   Substance and Sexual Activity    Alcohol use: Never    Drug use: Never    Sexual activity: Not Currently   Other Topics Concern    Caffeine Concern No    Exercise No       Family History   Problem Relation Age of Onset    Diabetes Father     Cancer Sister         stomach cancer    No Known Problems Sister     Cancer Brother     Glaucoma Neg     Macular degeneration Neg        Past Medical History:   Diagnosis Date    Acid reflux     Age-related nuclear cataract of both eyes 06/24/2019    Back problem     CAD (coronary artery disease)     COPD (chronic obstructive pulmonary disease) (HCC)     Decreased lung capacity     Deviated septum     Esophageal reflux     Floaters, right 06/24/2019    Fractured nasal bones     Hearing difficulty     High cholesterol     Macular degeneration POAG (primary open-angle glaucoma) 05/25/2021    Started Latanoprost in both eyes at bedtime- RJM    Shortness of breath     with exertion    Tubular adenoma of colon 2019    repeat CLN in 5 yrs    Visual impairment     Glasses       Past Surgical History:   Procedure Laterality Date    ANGIOGRAM      Was told he had a blockage     COLONOSCOPY      [facility] about 5 yrs ago    COLONOSCOPY N/A 8/20/2019    Procedure: COLONOSCOPY;  Surgeon: Tristan Medellin MD;  Location: [address] [place] [address] ENDOSCOPY    EGD      OTHER SURGICAL HISTORY      nasal surgery         REVIEW OF SYSTEMS    System Neg/Pos Details   Constitutional Negative Fatigue, fever and weight loss. ENMT Negative Drooling. Eyes Negative Blurred vision and vision changes. Respiratory Negative Dyspnea and wheezing. Cardio Negative Chest pain, irregular heartbeat/palpitations and syncope. GI Negative Abdominal pain and diarrhea. Endocrine Negative Cold intolerance and heat intolerance. Neuro Negative Tremors. Psych Negative Anxiety and depression. Integumentary Negative Frequent skin infections, pigment change and rash. Hema/Lymph Negative Easy bleeding and easy bruising. PHYSICAL EXAM    There were no vitals taken for this visit. Constitutional Normal Overall appearance - Normal.   Psychiatric Normal Orientation - Oriented to time, place, person & situation. Appropriate mood and affect. Neck Exam Normal Inspection - Normal. Palpation - Normal. Parotid gland - Normal. Thyroid gland - Normal.  Tenderness left submandibular region. Tenderness of the submandibular gland? Eyes Normal Conjunctiva - Right: Normal, Left: Normal. Pupil - Right: Normal, Left: Normal. Fundus - Right: Normal, Left: Normal.   Neurological Normal Memory - Normal. Cranial nerves - Cranial nerves II through XII grossly intact.    Head/Face Normal Facial features - Normal. Eyebrows - Normal. Skull - Normal.        Nasopharynx Normal External nose - Normal. Lips/teeth/gums - Normal. Tonsils - Normal. Oropharynx - Normal.   Ears Normal Inspection - Right: Normal, Left: Normal. Canal - Right: Normal, Left: Normal. TM - Right: Normal, Left: Normal.   Skin Normal Inspection - Normal.        Lymph Detail Normal Submental. Submandibular. Anterior cervical. Posterior cervical. Supraclavicular. Nose/Mouth/Throat Normal External nose - Normal. Lips/teeth/gums - Normal. Tonsils - Normal. Oropharynx - Normal.   Nose/Mouth/Throat Normal Nares - Right: Normal Left: Normal. Septum -Normal  Turbinates - Right: Normal, Left: Normal.       Current Outpatient Medications:     Meloxicam 15 MG Oral Tab, Take 1 tablet (15 mg total) by mouth daily. , Disp: 30 tablet, Rfl: 3    cyclobenzaprine 5 MG Oral Tab, Take 1 tablet (5 mg total) by mouth nightly., Disp: 30 tablet, Rfl: 1    lisinopril 10 MG Oral Tab, Take 1 tablet (10 mg total) by mouth daily. , Disp: , Rfl:     atorvastatin 20 MG Oral Tab, Take 1 tablet (20 mg total) by mouth daily. , Disp: , Rfl:     aspirin 81 MG Oral Tab EC, Take 1 tablet (81 mg total) by mouth daily. , Disp: , Rfl:     Vitamins-Lipotropics (LIPO FLAVONOID PLUS OR), Take by mouth., Disp: , Rfl:     meclizine 12.5 MG Oral Tab, Take 1 tablet (12.5 mg total) by mouth 3 (three) times daily as needed for Dizziness. CAUSES DROWSINESS, Disp: 30 tablet, Rfl: 0    ezetimibe (ZETIA) 10 MG Oral Tab, Take 1 tablet (10 mg total) by mouth daily. , Disp: 90 tablet, Rfl: 3    montelukast 10 MG Oral Tab, Take 1 tablet (10 mg total) by mouth nightly., Disp: 90 tablet, Rfl: 3    COD LIVER OIL OR, Take by mouth., Disp: , Rfl:     B Complex Vitamins (VITAMIN B COMPLEX OR), Inject as directed., Disp: , Rfl:     Calcium Carbonate-Vitamin D (CALTRATE 600+D OR), Take by mouth nightly., Disp: , Rfl:     Probiotic Product (PROBIOTIC DAILY OR), Take by mouth., Disp: , Rfl:   ASSESSMENT AND PLAN    1. Eagle's syndrome  Primarily complains of pain in the submandibular region on the left. Quite significantly when he turns to the left. He will syndrome? He has been told in the past that he may have an elongated styloid process. I did recommend that he undergo a CT scan of the neck to evaluate for an elongated styloid process I will start him on cyclobenzaprine and meloxicam for his discomfort. Return to see me after his studies  - CT SOFT TISSUE OF NECK(CONTRAST ONLY) (CPT=70491); Future        This note was prepared using [employer] voice recognition dictation software. As a result errors may occur. When identified these errors have been corrected. While every attempt is made to correct errors during dictation discrepancies may still exist    Perez Meade MD    12/15/2023    6:45 PM

## 2023-12-26 ENCOUNTER — HOSPITAL ENCOUNTER (OUTPATIENT)
Dept: CT IMAGING | Facility: HOSPITAL | Age: 73
Discharge: HOME | End: 2023-12-26
Attending: OTOLARYNGOLOGY
Payer: COMMERCIAL

## 2023-12-26 DIAGNOSIS — M24.20 EAGLE'S SYNDROME: ICD-10-CM

## 2023-12-26 LAB
CREAT BLD-MCNC: 1.1 MG/DL
EGFRCR SERPLBLD CKD-EPI 2021: 70 ML/MIN/1.73M2 (ref 60–?)

## 2023-12-26 PROCEDURE — 82565 ASSAY OF CREATININE: CPT

## 2023-12-26 PROCEDURE — 70491 CT SOFT TISSUE NECK W/DYE: CPT | Performed by: OTOLARYNGOLOGY

## 2024-01-12 ENCOUNTER — OFFICE VISIT (OUTPATIENT)
Dept: OTOLARYNGOLOGY | Facility: CLINIC | Age: 74
End: 2024-01-12

## 2024-01-12 DIAGNOSIS — M24.20 EAGLE'S SYNDROME: Primary | ICD-10-CM

## 2024-01-12 PROCEDURE — 99214 OFFICE O/P EST MOD 30 MIN: CPT | Performed by: OTOLARYNGOLOGY

## 2024-01-12 NOTE — PROGRESS NOTES
Tarik Turpin is a 73 year old male.    Chief Complaint   Patient presents with    Results     Patient is here due to discuss CT results        HISTORY OF PRESENT ILLNESS        Presents with throat discomfort in the submental region for the past 6 months.  Also has been told in the past is TMJ disorder.  Pain occurs in his throat when he turns to the left only.  He does admit to a previous problem with significant throat pain with swallowing states that he was seen in [place] by another doctor started on carbamazepine with complete resolution of his symptoms.  Only uses carbamazepine on apparent basis at this point.  Was told that he may have an elongated styloid process when he was in [place].  Had an MRI reportedly this.  No other signs, symptoms or complaints.  Sent by Dr. Hernandez for my opinion regarding his complaints     1/12/24 last visit started on meloxicam and cyclobenzaprine with very good control of his pain and discomfort.  States that he has very minimal pain at this time on the left side and usually would elicit significant pain with simply turning his head in 1 direction of the other looking up with his head back.  CT scan was performed I personally reviewed the images and interpreted them and discussed the results of the study and went over the images with the patient in the office.  This does demonstrate bilateral stylohyoid bones which become ligaments with about a 4 cm stylohyoid ligament on the left and a 3 cm ligament on the right.  No other signs, symptoms or complaints    Family History   Problem Relation Age of Onset    Diabetes Father     Cancer Sister         stomach cancer    No Known Problems Sister     Cancer Brother     Glaucoma Neg     Macular degeneration Neg        Past Medical History:   Diagnosis Date    Acid reflux     Age-related nuclear cataract of both eyes 06/24/2019    Back problem     CAD (coronary artery disease)     COPD (chronic obstructive pulmonary disease) ([facility])      Decreased lung capacity     Deviated septum     Esophageal reflux     Floaters, right 06/24/2019    Fractured nasal bones     Hearing difficulty     High cholesterol     Macular degeneration     POAG (primary open-angle glaucoma) 05/25/2021    Started Latanoprost in both eyes at bedtime- RJM    Shortness of breath     with exertion    Tubular adenoma of colon 2019    repeat CLN in 5 yrs    Visual impairment     Glasses       Past Surgical History:   Procedure Laterality Date    ANGIOGRAM      Was told he had a blockage     COLONOSCOPY      [facility] about 5 yrs ago    COLONOSCOPY N/A 8/20/2019    Procedure: COLONOSCOPY;  Surgeon: IRINEO Villasenor MD;  Location: [facility] ENDOSCOPY    EGD      OTHER SURGICAL HISTORY      nasal surgery         REVIEW OF SYSTEMS    System Neg/Pos Details   Constitutional Negative Fatigue, fever and weight loss.   ENMT Negative Drooling.   Eyes Negative Blurred vision and vision changes.   Respiratory Negative Dyspnea and wheezing.   Cardio Negative Chest pain, irregular heartbeat/palpitations and syncope.   GI Negative Abdominal pain and diarrhea.   Endocrine Negative Cold intolerance and heat intolerance.   Neuro Negative Tremors.   Psych Negative Anxiety and depression.   Integumentary Negative Frequent skin infections, pigment change and rash.   Hema/Lymph Negative Easy bleeding and easy bruising.           PHYSICAL EXAM    There were no vitals taken for this visit.       Constitutional Normal Overall appearance - Normal.   Psychiatric Normal Orientation - Oriented to time, place, person & situation. Appropriate mood and affect.   Neck Exam Normal Inspection - Normal. Palpation - Normal. Parotid gland - Normal. Thyroid gland - Normal.   Eyes Normal Conjunctiva - Right: Normal, Left: Normal. Pupil - Right: Normal, Left: Normal. Fundus - Right: Normal, Left: Normal.   Neurological Normal Memory - Normal. Cranial nerves - Cranial nerves II through XII grossly intact.   Head/Face Normal  Facial features - Normal. Eyebrows - Normal. Skull - Normal.        Nasopharynx Normal External nose - Normal. Lips/teeth/gums - Normal. Tonsils - Normal. Oropharynx - Normal.   Ears Normal Inspection - Right: Normal, Left: Normal. Canal - Right: Normal, Left: Normal. TM - Right: Normal, Left: Normal.   Skin Normal Inspection - Normal.        Lymph Detail Normal Submental. Submandibular. Anterior cervical. Posterior cervical. Supraclavicular.        Nose/Mouth/Throat Normal External nose - Normal. Lips/teeth/gums - Normal. Tonsils - Normal. Oropharynx - Normal.   Nose/Mouth/Throat Normal Nares - Right: Normal Left: Normal. Septum -Normal  Turbinates - Right: Normal, Left: Normal.       Current Outpatient Medications:     Meloxicam 15 MG Oral Tab, Take 1 tablet (15 mg total) by mouth daily., Disp: 30 tablet, Rfl: 3    cyclobenzaprine 5 MG Oral Tab, Take 1 tablet (5 mg total) by mouth nightly., Disp: 30 tablet, Rfl: 1    lisinopril 10 MG Oral Tab, Take 1 tablet (10 mg total) by mouth daily., Disp: , Rfl:     atorvastatin 20 MG Oral Tab, Take 1 tablet (20 mg total) by mouth daily., Disp: , Rfl:     aspirin 81 MG Oral Tab EC, Take 1 tablet (81 mg total) by mouth daily., Disp: , Rfl:     Vitamins-Lipotropics (LIPO FLAVONOID PLUS OR), Take by mouth., Disp: , Rfl:     ezetimibe (ZETIA) 10 MG Oral Tab, Take 1 tablet (10 mg total) by mouth daily., Disp: 90 tablet, Rfl: 3    montelukast 10 MG Oral Tab, Take 1 tablet (10 mg total) by mouth nightly., Disp: 90 tablet, Rfl: 3    COD LIVER OIL OR, Take by mouth., Disp: , Rfl:     B Complex Vitamins (VITAMIN B COMPLEX OR), Inject as directed., Disp: , Rfl:     Calcium Carbonate-Vitamin D (CALTRATE 600+D OR), Take by mouth nightly., Disp: , Rfl:     Probiotic Product (PROBIOTIC DAILY OR), Take by mouth., Disp: , Rfl:     meclizine 12.5 MG Oral Tab, Take 1 tablet (12.5 mg total) by mouth 3 (three) times daily as needed for Dizziness. CAUSES DROWSINESS, Disp: 30 tablet, Rfl:  0  ASSESSMENT AND PLAN    1. [place]'s syndrome  She has a 4 cm styloid ligament of the left and 3 cm styloid ligament on the right.  At this time is done quite well using cyclobenzaprine and meloxicam.  We discussed surgical management versus simply continued use of medications.  At this time he states the medications have helped so well that he simply wants to continue the use.  Return to see me in 2 months for reevaluation.  Present 30 minutes spent with patient in discussions regarding management of his bilateral Eagle syndrome as well as reviewing his CT scan images in the office today.        This note was prepared using [employer] voice recognition dictation software. As a result errors may occur. When identified these errors have been corrected. While every attempt is made to correct errors during dictation discrepancies may still exist    Perez Bloom MD    1/12/2024    2:07 PM

## 2024-01-23 ENCOUNTER — TELEPHONE (OUTPATIENT)
Dept: CASE MANAGEMENT | Age: 74
End: 2024-01-23

## 2024-01-23 NOTE — TELEPHONE ENCOUNTER
Action Requested: Summary for Provider     []  Critical Lab, Recommendations Needed  [] Need Additional Advice  []   FYI    []   Need Orders  [] Need Medications Sent to Pharmacy  []  Other     SUMMARY:  Asking if he should get both RSV & Covid vaccine.    They will be traveling to [facility] middle of Feb 2024.    Requesting to have vaccine at [facility] as it is closer to his home.    Reason for call: Condition Update  Onset: Data Unavailable

## 2024-01-23 NOTE — TELEPHONE ENCOUNTER
Left Voicemail to call back our office. Office phone number provided with office telephone hours.      We do not give these vaccines in the office , they can go to their local pharmacy

## 2024-01-23 NOTE — TELEPHONE ENCOUNTER
Dr. Hernandez,     Patient has questions regarding the RSV and COVID vaccinations.    Thank you.

## 2024-01-24 NOTE — TELEPHONE ENCOUNTER
Called patient back after seeing message stating [facility] does not carry RSV vaccine. Informed patients wife and also informed Kayleigh lópez and [facility] do have vaccine. Also informed her to contact her insurance to inquire if its covered and if so if its covered through pharmacy or PCP office as there is a form to sign before administration regarding responsibility of payment incase not covered by insurance. Patients wife verbalized understanding . Canceled appointment.

## 2024-02-05 ENCOUNTER — TELEPHONE (OUTPATIENT)
Dept: INTERNAL MEDICINE CLINIC | Facility: CLINIC | Age: 74
End: 2024-02-05

## 2024-02-05 NOTE — TELEPHONE ENCOUNTER
Patient states he needs documentation that states that Dr. Hernandez is recommending that he take the RSV vaccine so that she can submit it to his insurance for coverage.    Letter pended in communications for your approval.    Verified name and  of patient.    Wife of patient (on PAULY) states that 2 weeks ago, patient checked blood pressure at [facility] and reading was elevated- unaware of what reading was. She denies that patient had any symptoms at that time and denies that patient has any symptoms at this time.    She states that patient has not checked her blood pressure since then.     Advised to monitor blood pressures  and log them over the next couple of days and call in to schedule follow up appointment if blood pressures are abnormal.

## 2024-02-05 NOTE — TELEPHONE ENCOUNTER
Pt did not view my chart. Called pt and advised Letter available in my chart.    Pt wanted to inform Dr Hernandez he had OV with Dr Bloom 1/12/24 and pt was prescribed meloxicam and cyclobenzaprine.  Per pt these medications did not work and pt went back to taking Carbamazine \"I got that from a doctor in [place] and it works well\"   Pt wanted to know Dr Hernandez's thoughts on taking the Carbamazine for his throat \"Dr Bloom did tell me to stop taking this, but it seems to work\"    Please advise.

## 2024-02-06 NOTE — TELEPHONE ENCOUNTER
Called Pt and left a detailed message on voicemail with Dr. Hernandez's message.  Ok per PAULY.  Per Dr. Bloom's notes, Pt was taking carbamezapine 1/12/24  Pt should schedule follow up with Dr. 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   She has a 4 cm styloid ligament of the left and 3 cm styloid ligament on the right.    At this time is done quite well using cyclobenzaprine and meloxicam.  We discussed surgical management versus simply continued use of medications.  At this time he states the medications have helped so well that he simply wants to continue the use.  Return to see me in 2 months for reevaluation.  Present 30 minutes spent with patient in discussions regarding management of his bilateral Eagle syndrome as well as reviewing his CT scan images in the office today.

## 2024-02-07 ENCOUNTER — TELEPHONE (OUTPATIENT)
Facility: CLINIC | Age: 74
End: 2024-02-07

## 2024-02-07 NOTE — TELEPHONE ENCOUNTER
You1 month ago     AP  Noted. Will follow up on cardiac clearance prior to procedure on 4/23/24.         Note     Rach Saeed CMA routed conversation to [facility] Clinical Staff1 month ago     Rach Saeed CMA1 month ago     [place]  [facility],     Patient is scheduled for Colonoscopy procedure on 4/23/2024. Patient reports he just had a Pacemaker put on 3 weeks ago at [facility]. Per Dr Villasenor's note below please:     Obtain cardiac clearance from Dr. Kvng Rucker.         Thank you!

## 2024-02-09 ENCOUNTER — IMMUNIZATION (OUTPATIENT)
Dept: LAB | Age: 74
End: 2024-02-09
Attending: EMERGENCY MEDICINE
Payer: COMMERCIAL

## 2024-02-09 DIAGNOSIS — Z23 NEED FOR VACCINATION: Primary | ICD-10-CM

## 2024-02-09 PROCEDURE — 90480 ADMN SARSCOV2 VAC 1/ONLY CMP: CPT

## 2024-04-02 ENCOUNTER — TELEPHONE (OUTPATIENT)
Dept: CARDIOLOGY | Age: 74
End: 2024-04-02

## 2024-04-03 NOTE — TELEPHONE ENCOUNTER
Current Outpatient Medications:       atorvastatin 20 MG Oral Tab, Take 1 tablet (20 mg total) by mouth daily., Disp: , Rfl:

## 2024-04-05 NOTE — TELEPHONE ENCOUNTER
Refill passed per [facility] protocol.  Last office visit 11/20/2023 with Dr Jacome     Medication is listed as patient reported, not yet prescribed by provider       Requested Prescriptions   Pending Prescriptions Disp Refills    atorvastatin 20 MG Oral Tab 90 tablet 0     Sig: Take 1 tablet (20 mg total) by mouth daily.       Cholesterol Medication Protocol Passed - 4/3/2024  3:56 PM        Passed - ALT < 80     Lab Results   Component Value Date    ALT 23 11/21/2023             Passed - ALT resulted within past year        Passed - Lipid panel within past 12 months     Lab Results   Component Value Date    CHOLEST 200 (H) 11/21/2023    TRIG 135 11/21/2023    HDL 57 11/21/2023     (H) 11/21/2023    VLDL 24 11/21/2023    NONHDLC 143 (H) 11/21/2023             Passed - In person appointment or virtual visit in the past 12 mos or appointment in next 3 mos     Recent Out[facility] Visits              [facility]    [facility]    3 [facility]    Office Visit    3 months ago Third degree heart block (HCC)    [facility]    Office Visit    4 months ago [facility]    [facility] Visit    4 months ago Pre-syncope    [facility], [facility] Laura, MD    Office Visit          Future Appointments         Provider Department Appt Notes    In 2 weeks [facility]                  Future Appointments         Provider Department Appt Notes    In 2 weeks EDUARDA BAUM [facility] Colonoscopy w/MAC @ [facility]          Recent  Outpatient Visits              2 months ago Eagle's syndrome    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, [facility] Logan Mathew MD    Office Visit    4 months ago Abnormal videonystagmogram (VNG)    [facility], Yannick Gardner MD    Office Visit    4 months ago Pre-syncope    [facility] [place] [place]    Office Visit

## 2024-04-10 RX ORDER — ATORVASTATIN CALCIUM 20 MG/1
20 TABLET, FILM COATED ORAL DAILY
Qty: 90 TABLET | Refills: 0 | Status: SHIPPED | OUTPATIENT
Start: 2024-04-10

## 2024-04-15 ENCOUNTER — TELEPHONE (OUTPATIENT)
Facility: CLINIC | Age: 74
End: 2024-04-15

## 2024-04-17 NOTE — PAT NURSING NOTE
RN spoke with patient to review preadmission testing questions regarding upcoming endoscopy procedure scheduled on 04/23/2024. Patient verbalized that he was unable to take notes during PAT call and requested that colonoscopy prep instructions be sent. Prep instructions sent to patient via [employer]. Date, location and time of arrival confirmed with patient.

## 2024-04-17 NOTE — PAT NURSING NOTE
RN spoke with Sam from [employer] who verbalized that patient's pacemaker is safe for magnet use. Sam verbalized that if a magnet is placed over device for cautery, the device would go into an asynchronous mode at a rate of 100 or \"slightly less.\" After magnet is removed, the device would return to original program setting. Sam verbalized that no interrogation is required after magnet use.

## 2024-04-23 ENCOUNTER — HOSPITAL ENCOUNTER (OUTPATIENT)
Facility: HOSPITAL | Age: 74
Setting detail: HOSPITAL OUTPATIENT SURGERY
Discharge: HOME | End: 2024-04-23
Attending: INTERNAL MEDICINE | Admitting: INTERNAL MEDICINE
Payer: COMMERCIAL

## 2024-04-23 ENCOUNTER — ANESTHESIA EVENT (OUTPATIENT)
Dept: ENDOSCOPY | Facility: HOSPITAL | Age: 74
End: 2024-04-23
Payer: COMMERCIAL

## 2024-04-23 ENCOUNTER — ANESTHESIA (OUTPATIENT)
Dept: ENDOSCOPY | Facility: HOSPITAL | Age: 74
End: 2024-04-23
Payer: COMMERCIAL

## 2024-04-23 VITALS
HEIGHT: 69 IN | HEART RATE: 66 BPM | DIASTOLIC BLOOD PRESSURE: 82 MMHG | SYSTOLIC BLOOD PRESSURE: 134 MMHG | TEMPERATURE: 98 F | OXYGEN SATURATION: 99 % | WEIGHT: 160 LBS | RESPIRATION RATE: 14 BRPM | BODY MASS INDEX: 23.7 KG/M2

## 2024-04-23 DIAGNOSIS — Z12.11 SCREENING FOR COLON CANCER: ICD-10-CM

## 2024-04-23 PROBLEM — K57.30 DIVERTICULA OF COLON: Status: ACTIVE | Noted: 2024-04-23

## 2024-04-23 PROCEDURE — 0DBM8ZX EXCISION OF DESCENDING COLON, VIA NATURAL OR ARTIFICIAL OPENING ENDOSCOPIC, DIAGNOSTIC: ICD-10-PCS | Performed by: INTERNAL MEDICINE

## 2024-04-23 PROCEDURE — 0DBK8ZX EXCISION OF ASCENDING COLON, VIA NATURAL OR ARTIFICIAL OPENING ENDOSCOPIC, DIAGNOSTIC: ICD-10-PCS | Performed by: INTERNAL MEDICINE

## 2024-04-23 PROCEDURE — 45385 COLONOSCOPY W/LESION REMOVAL: CPT | Performed by: INTERNAL MEDICINE

## 2024-04-23 RX ORDER — NALOXONE HYDROCHLORIDE 0.4 MG/ML
0.08 INJECTION, SOLUTION INTRAMUSCULAR; INTRAVENOUS; SUBCUTANEOUS ONCE AS NEEDED
Status: DISCONTINUED | OUTPATIENT
Start: 2024-04-23 | End: 2024-04-23

## 2024-04-23 RX ORDER — LIDOCAINE HYDROCHLORIDE 10 MG/ML
INJECTION, SOLUTION EPIDURAL; INFILTRATION; INTRACAUDAL; PERINEURAL AS NEEDED
Status: DISCONTINUED | OUTPATIENT
Start: 2024-04-23 | End: 2024-04-23 | Stop reason: SURG

## 2024-04-23 RX ORDER — SODIUM CHLORIDE, SODIUM LACTATE, POTASSIUM CHLORIDE, CALCIUM CHLORIDE 600; 310; 30; 20 MG/100ML; MG/100ML; MG/100ML; MG/100ML
INJECTION, SOLUTION INTRAVENOUS CONTINUOUS
Status: DISCONTINUED | OUTPATIENT
Start: 2024-04-23 | End: 2024-04-23

## 2024-04-23 RX ADMIN — SODIUM CHLORIDE, SODIUM LACTATE, POTASSIUM CHLORIDE, CALCIUM CHLORIDE: 600; 310; 30; 20 INJECTION, SOLUTION INTRAVENOUS at 08:17:00

## 2024-04-23 RX ADMIN — SODIUM CHLORIDE, SODIUM LACTATE, POTASSIUM CHLORIDE, CALCIUM CHLORIDE: 600; 310; 30; 20 INJECTION, SOLUTION INTRAVENOUS at 07:53:00

## 2024-04-23 RX ADMIN — LIDOCAINE HYDROCHLORIDE 25 MG: 10 INJECTION, SOLUTION EPIDURAL; INFILTRATION; INTRACAUDAL; PERINEURAL at 08:01:00

## 2024-04-23 NOTE — H&P
History & Physical Examination    Patient Name: Tarik Turpin  MRN: [id number]  [facility]: [id number]  YOB: 1950    Diagnosis: screening for colon cancer    Medications Prior to Admission   Medication Sig Dispense Refill Last Dose    atorvastatin 20 MG Oral Tab Take 1 tablet (20 mg total) by mouth daily. 90 tablet 0 4/21/2024    lisinopril 10 MG Oral Tab Take 1 tablet (10 mg total) by mouth daily.   4/21/2024    aspirin 81 MG Oral Tab EC Take 1 tablet (81 mg total) by mouth daily.   4/21/2024    ezetimibe (ZETIA) 10 MG Oral Tab Take 1 tablet (10 mg total) by mouth daily. 90 tablet 3 4/21/2024    montelukast 10 MG Oral Tab Take 1 tablet (10 mg total) by mouth nightly. 90 tablet 3 4/21/2024    Calcium Carbonate-Vitamin D (CALTRATE 600+D OR) Take by mouth nightly.       Probiotic Product (PROBIOTIC DAILY OR) Take by mouth.   4/21/2024    polyethylene glycol, PEG 3350-KCl-NaBcb-NaCl-NaSulf, 236 g Oral Recon Soln Take 4,000 mL by mouth As Directed. May substitute prescription with Golytely/Nulytely/Gavilyte and or generic equivalent, if needed. Take bowel preparation as provided by Gastroenterology, or visit our website at [email or web address] 4000 mL 0 4/21/2024    Meloxicam 15 MG Oral Tab Take 1 tablet (15 mg total) by mouth daily. 30 tablet 3     cyclobenzaprine 5 MG Oral Tab Take 1 tablet (5 mg total) by mouth nightly. 30 tablet 1     Vitamins-Lipotropics (LIPO FLAVONOID PLUS OR) Take by mouth.       meclizine 12.5 MG Oral Tab Take 1 tablet (12.5 mg total) by mouth 3 (three) times daily as needed for Dizziness. CAUSES DROWSINESS 30 tablet 0     COD LIVER OIL OR Take by mouth.       B Complex Vitamins (VITAMIN B COMPLEX OR) Inject as directed.        Current Facility-Administered Medications   Medication Dose Route Frequency    lactated ringers infusion   Intravenous Continuous       Allergies: No Known Allergies    Past Medical History:    Acid reflux     Age-related nuclear cataract of both eyes    Back problem    CAD (coronary artery disease)    COPD (chronic obstructive pulmonary disease) (HCC)    Decreased lung capacity    Deviated septum    Esophageal reflux    Floaters, right    Fractured nasal bones    Hearing difficulty    High blood pressure    High cholesterol    Macular degeneration    POAG (primary open-angle glaucoma)    Started Latanoprost in both eyes at bedtime- RJM    Shortness of breath    with exertion    Tubular adenoma of colon    repeat CLN in 5 yrs    Visual impairment    Glasses     Past Surgical History:   Procedure Laterality Date    Angiogram      Was told he had a blockage     Cardiac pacemaker placement      [employer] per patient    Colonoscopy      Addvocate about 5 yrs ago    Colonoscopy N/A 08/20/2019    Procedure: COLONOSCOPY;  Surgeon: IRINEO Villasenor MD;  Location: [facility] ENDOSCOPY    Egd      Other surgical history      nasal surgery     Family History   Problem Relation Age of Onset    Diabetes Father     Cancer Sister         stomach cancer    No Known Problems Sister     Cancer Brother     Glaucoma Neg     Macular degeneration Neg      Social History     Tobacco Use    Smoking status: Never    Smokeless tobacco: Never   Substance Use Topics    Alcohol use: Never       SYSTEM Check if Review is Normal Check if Physical Exam is Normal If not normal, please explain:   HEENT [X ] [ X]    NECK  [X ] [ X]    HEART [X ] [ X]    LUNGS [X ] [ X]    ABDOMEN [X ] [ X]    EXTREMITIES [X ] [ X]    OTHER        I have discussed the risks and benefits and alternatives of the procedure with the patient/family.  They understand and agree to proceed with plan of care.   I have reviewed the History and Physical done within the last 30 days.  Any changes noted above.    JOHAN Villasenor MD  [facility] - Gastroenterology  4/23/2024  7:34 AM

## 2024-04-23 NOTE — ANESTHESIA PREPROCEDURE EVALUATION
Anesthesia PreOp Note    HPI:     Tarik Turpin is a 73 year old male who presents for preoperative consultation requested by: IRINEO Villasenor MD    Date of Surgery: 4/23/2024    Procedure(s):  COLONOSCOPY  Indication: Screening for colon cancer    Relevant Problems   No relevant active problems       NPO:  Last Liquid Consumption Date: 04/23/24  Last Liquid Consumption Time: 0200  Last Solid Consumption Date: 04/22/24  Last Solid Consumption Time: 1600  Last Liquid Consumption Date: 04/23/24          History Review:  Patient Active Problem List    Diagnosis Date Noted    Third degree heart block (HCC) 12/08/2023    Status post placement of cardiac pacemaker 12/08/2023    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    Thyroiditis 08/02/2022    Odynophagia 08/02/2022    Lymphadenopathy 08/02/2022    POAG (primary open-angle glaucoma) 05/25/2021    Polyp of ascending colon     Glaucoma suspect of both eyes 06/24/2019    Age-related nuclear cataract of both eyes 06/24/2019    Floaters, right 06/24/2019    Sensorineural hearing loss, bilateral 06/19/2019    Centrilobular emphysema (HCC) 06/05/2019    Gastroesophageal reflux disease without esophagitis 06/05/2019    Macular degeneration of both eyes 06/05/2019    Deviated nasal septum 06/05/2019    Coronary artery disease involving native coronary artery of native heart without angina pectoris 06/05/2019    Benign prostatic hyperplasia with nocturia 06/05/2019    Hypercholesterolemia 06/05/2019    Numbness and tingling of foot 05/21/2014       Past Medical History:    Acid reflux    Age-related nuclear cataract of both eyes    Back problem    CAD (coronary artery disease)    COPD (chronic obstructive pulmonary disease) (HCC)    Decreased lung capacity    Deviated septum    Esophageal reflux    Floaters, right    Fractured nasal bones    Hearing difficulty    High blood pressure    High cholesterol    Macular degeneration    POAG (primary open-angle glaucoma)    Started Latanoprost in  both eyes at bedtime- RJM    Shortness of breath    with exertion    Tubular adenoma of colon    repeat CLN in 5 yrs    Visual impairment    Glasses       Past Surgical History:   Procedure Laterality Date    Angiogram      Was told he had a blockage     Cardiac pacemaker placement      [employer] per patient    Colonoscopy      Addvocate about 5 yrs ago    Colonoscopy N/A 08/20/2019    Procedure: COLONOSCOPY;  Surgeon: IRINEO Villasenor MD;  Location: [facility] ENDOSCOPY    Egd      Other surgical history      nasal surgery       Medications Prior to Admission   Medication Sig Dispense Refill Last Dose    atorvastatin 20 MG Oral Tab Take 1 tablet (20 mg total) by mouth daily. 90 tablet 0 4/21/2024    lisinopril 10 MG Oral Tab Take 1 tablet (10 mg total) by mouth daily.   4/21/2024    aspirin 81 MG Oral Tab EC Take 1 tablet (81 mg total) by mouth daily.   4/21/2024    ezetimibe (ZETIA) 10 MG Oral Tab Take 1 tablet (10 mg total) by mouth daily. 90 tablet 3 4/21/2024    montelukast 10 MG Oral Tab Take 1 tablet (10 mg total) by mouth nightly. 90 tablet 3 4/21/2024    Calcium Carbonate-Vitamin D (CALTRATE 600+D OR) Take by mouth nightly.       Probiotic Product (PROBIOTIC DAILY OR) Take by mouth.   4/21/2024    polyethylene glycol, PEG 3350-KCl-NaBcb-NaCl-NaSulf, 236 g Oral Recon Soln Take 4,000 mL by mouth As Directed. May substitute prescription with Golytely/Nulytely/Gavilyte and or generic equivalent, if needed. Take bowel preparation as provided by Gastroenterology, or visit our website at [email or web address]/. 4000 mL 0 4/21/2024    Meloxicam 15 MG Oral Tab Take 1 tablet (15 mg total) by mouth daily. 30 tablet 3     cyclobenzaprine 5 MG Oral Tab Take 1 tablet (5 mg total) by mouth nightly. 30 tablet 1     Vitamins-Lipotropics (LIPO FLAVONOID PLUS OR) Take by mouth.       meclizine 12.5 MG Oral Tab Take 1 tablet (12.5 mg total) by mouth 3 (three) times daily as  needed for Dizziness. CAUSES DROWSINESS 30 tablet 0     COD LIVER OIL OR Take by mouth.       B Complex Vitamins (VITAMIN B COMPLEX OR) Inject as directed.        Current Facility-Administered Medications Ordered in Epic   Medication Dose Route Frequency Provider Last Rate Last Admin    lactated ringers infusion   Intravenous Continuous IRINEO Villasenor MD         No current [facility]-ordered outpatient medications on file.       No Known Allergies    Family History   Problem Relation Age of Onset    Diabetes Father     Cancer Sister         stomach cancer    No Known Problems Sister     Cancer Brother     Glaucoma Neg     Macular degeneration Neg      Social History     Socioeconomic History    Marital status:    Tobacco Use    Smoking status: Never    Smokeless tobacco: Never   Vaping Use    Vaping status: Never Used   Substance and Sexual Activity    Alcohol use: Never    Drug use: Never    Sexual activity: Not Currently   Other Topics Concern    Caffeine Concern No    Exercise No       Available pre-op labs reviewed.             Vital Signs:  Body mass index is 23.63 kg/m².   height is 1.753 m (5' 9\") and weight is 72.6 kg (160 lb). His blood pressure is 121/84 and his pulse is 82. His respiration is 16 and oxygen saturation is 99%.   Vitals:    04/17/24 1604 04/23/24 0739   BP:  121/84   Pulse:  82   Resp:  16   SpO2:  99%   Weight: 72.6 kg (160 lb)    Height: 1.753 m (5' 9\")         Anesthesia Evaluation     Patient summary reviewed and Nursing notes reviewed    No history of anesthetic complications   Airway   Mallampati: II  TM distance: >3 FB  Neck ROM: full  Dental - Dentition appears grossly intact     Pulmonary - normal exam   (+) COPD, shortness of breath (occassional on exertion)  Cardiovascular - normal exam  (+) pacemaker (pacemaker placed 12/2023 for complete heart block), hypertension, CAD    Neuro/Psych - negative ROS     GI/Hepatic/Renal    (+) GERD, bowel prep    Endo/Other - negative ROS    Abdominal  - normal exam                 Anesthesia Plan:   ASA:  3  Plan:   General  Informed Consent Plan and Risks Discussed With:  Patient  Discussed plan with:  CRNA and surgeon      I have informed Tarik Turpin and/or legal guardian or family member of the nature of the anesthetic plan, benefits, risks including possible dental damage if relevant, major complications, and any alternative forms of anesthetic management.   All of the patient's questions were answered to the best of my ability. The patient desires the anesthetic management as planned.  Marie Valdovinos CRNA  4/23/2024 7:46 AM  Present on Admission:  **None**

## 2024-04-23 NOTE — OPERATIVE REPORT
COLONOSCOPY REPORT    Tarik Turipn     1950 Age 73 year old   PCP Logan Hernandez MD Endoscopist Bharti Villasenor MD     Date of procedure: 24    Procedure: Colonoscopy w/cold snare polypectomy    Pre-operative diagnosis: Screening    Post-operative diagnosis: Polyp(s) of colon, diverticulosis of the colon, internal hemorrhoids    Medications: MAC    Withdrawal time: 11 minutes    Procedure:  Informed consent was obtained from the patient after the risks of the procedure were discussed, including but not limited to bleeding, perforation, aspiration, infection, or possibility of a missed lesion. After discussions of the risks/benefits and alternatives to this procedure, as well as the planned sedation, the patient was placed in the left lateral decubitus position and begun on continuous blood pressure pulse oximetry and EKG monitoring and this was maintained throughout the procedure. Once an adequate level of sedation was obtained a digital rectal exam was completed. Then the lubricated tip of the [id number] diagnostic video colonoscope was inserted and advanced without difficulty to the cecum using the CO2 insufflation technique. The cecum was identified by localizing the trifold, the appendix and the ileocecal valve. Withdrawal was begun with thorough washing and careful examination of the colonic walls and folds. A routine second examination of the cecum/ascending colon was performed. Photodocumentation was obtained. The bowel prep was good. Views of the colon were good with washing. I then carefully withdrew the instrument from the patient who tolerated the procedure well.     Complications: none.    Findings:   1. Two polyp(s) noted as follows:      A. 7 mm polyp in the ascending colon; sessile morphology; cold snare polypectomy and retrieved.      B. 7 mm polyp in the descending colon; sessile morphology; cold snare polypectomy and retrieved.    2. Diverticulosis: mild in the left  colon.    3. Terminal ileum: the visualized mucosa appeared normal.    4. The colonic mucosa throughout the colon showed normal vascular pattern, without evidence of angioectasias or inflammation.     5. A retroflexed view of the rectum revealed small internal hemorrhoids.    6. BARB: normal rectal tone, no masses palpated.     Impression:   Two small polyps removed.  Diverticulosis, mild.  Small internal hemorrhoids.    Recommend:  Await pathology. The interval for the next colonoscopy will be determined after reviewing pathology. If new signs or symptoms develop, colonoscopy may need to be repeated sooner.   High fiber diet.  Monitor for blood in the stool. If having more than just tinge of blood, call office or go to the ER.    >>>If tissue was obtained and you have not received your pathology results either by phone or letter within 2 weeks, please call our office at [phone].    Specimens: colon   Blood loss: <1 ml      ----------------------------------------------------------------------------------------------------------------------------------    INTERVAL FOR COLONOSCOPY:   - If there is no family history of colon cancer and no colon polyps identified in an adequately prepped colon - colonoscopy should be repeated in 10 years. Various factors should be considered regarding repeat colonoscopy - including co-morbid conditions, ability to tolerate procedure with advanced age, and desire for repeat testing.   - If no colon polyps were identified and a positive family history of colon cancer - the colonoscopy should be repeated in 5 years.   - If colon polyps were removed, the colonoscopy should be repeated sooner depending on size/type/location of polyp.

## 2024-04-23 NOTE — DISCHARGE INSTRUCTIONS
Home Care Instructions for Colonoscopy with Sedation    Diet:  - Resume your regular diet as tolerated unless otherwise instructed.  - Start with light meals to minimize bloating.  - Do not drink alcohol today.    Medication:  - If you have questions about resuming your normal medications, please contact your Primary Care Physician.    Activities:  - Take it easy today. Do not return to work today.  - Do not drive today.  - Do not operate any machinery today (including kitchen equipment).    Colonoscopy:  - You may notice some rectal \"spotting\" (a little blood on the toilet tissue) for a day or two after the exam. This is normal.  - If you experience any rectal bleeding (not spotting), persistent tenderness or sharp severe abdominal pains, oral temperature over 100 degrees Fahrenheit, light-headedness or dizziness, or any other problems, contact your doctor.    **If unable to reach your doctor, please go to the [facility] Emergency Room**    - Your referring physician will receive a full report of your examination.  - If you do not hear from your doctor's office within two weeks of your biopsy, please call them for your results.    You may be able to see your laboratory results in [facility] between 4 and 7 business days.  In some cases, your physician may not have viewed the results before they are released to [facility].  If you have questions regarding your results contact the physician who ordered the test/exam by phone or via [facility] by choosing \"Ask a Medical Question.\"

## 2024-04-23 NOTE — ANESTHESIA POSTPROCEDURE EVALUATION
Patient: Tarik Turpin    Procedure Summary       Date: 04/23/24 Room / Location: [facility] ENDOSCOPY 01 / [facility] ENDOSCOPY    Anesthesia Start: 0753 Anesthesia Stop: 0818    Procedure: COLONOSCOPY Diagnosis:       Screening for colon cancer      (polyps, diverticulosis, hemorrhoids)    Surgeons: IRINEO Villasenor MD Anesthesiologist: Marie Valdovinos CRNA    Anesthesia Type: general ASA Status: 3            Anesthesia Type: general    Vitals Value Taken Time   /64 04/23/24 0817   Temp 98 °F (36.7 °C) 04/23/24 0817   Pulse 80 04/23/24 0817   Resp 20 04/23/24 0817   SpO2 100 % 04/23/24 0817       [facility] AN Post Evaluation:   Patient Evaluated in PACU  Patient Participation: complete - patient participated  Level of Consciousness: sleepy but conscious  Pain Score: 0  Pain Management: adequate  Airway Patency:patent  Dental exam unchanged from preop  Yes    Nausea/Vomiting: none  Cardiovascular Status: acceptable  Respiratory Status: acceptable  Postoperative Hydration acceptable      Marie Valdovinos CRNA  4/23/2024 8:18 AM

## 2024-04-30 DIAGNOSIS — J43.2 CENTRILOBULAR EMPHYSEMA (HCC): ICD-10-CM

## 2024-05-02 NOTE — TELEPHONE ENCOUNTER
Please review; protocol failed/No Protocol    Requested Prescriptions   Pending Prescriptions Disp Refills    MONTELUKAST 10 MG Oral Tab [Pharmacy Med Name: Montelukast Sodium Oral Tablet 10 MG] 90 tablet 0     Sig: TAKE ONE TABLET BY MOUTH NIGHTLY       Asthma & COPD Medication Protocol Failed - 4/30/2024 11:40 PM        Failed - Asthma Action Score greater than or equal to 20        Failed - AAP/ACT given in last 12 months     No data recorded  No data recorded  No data recorded  No data recorded          Passed - Appointment in past 6 or next 3 months      Recent Outpatient Visits              3 months ago Eagle's syndrome    [facility] Perez Bowser MD    Office Visit    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    [facility] Perez Bowser MD    Office Visit    4 months ago Third degree heart block (HCC)    [facility] Logan Mathew MD    Office Visit    5 months ago [facility]    [facility]    5 months ago Pre-syncope    [facility] Eliza Child Laura, MD    Office Visit                           Recent Outpatient Visits              3 months ago Eagle's syndrome    [facility] Perez Boswer MD    Office Visit    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    [facility]    [facility] Visit    4 months ago [facility]    [facility]    5 months ago Abnormal [facility]stagm[facility] (VNG[facility]    [facility]    5 months ago Pre-syncope    [facility] [place] Eliza Child Laura, MD     Office Visit

## 2024-05-03 RX ORDER — MONTELUKAST SODIUM 10 MG/1
10 TABLET ORAL NIGHTLY
Qty: 90 TABLET | Refills: 3 | Status: SHIPPED | OUTPATIENT
Start: 2024-05-03

## 2024-05-09 ENCOUNTER — TELEPHONE (OUTPATIENT)
Dept: GASTROENTEROLOGY | Facility: CLINIC | Age: 74
End: 2024-05-09

## 2024-05-09 NOTE — TELEPHONE ENCOUNTER
I mailed out colonoscopy results letter to pt  Updated health maintenance  Colonoscopy done 4/23/2024.    IRINEO Villasenor MD  [facility] Clinical Staff  No recall

## 2024-06-20 ENCOUNTER — TELEPHONE (OUTPATIENT)
Dept: CARDIOLOGY | Age: 74
End: 2024-06-20

## 2024-06-21 ENCOUNTER — TELEPHONE (OUTPATIENT)
Dept: CARDIOLOGY | Age: 74
End: 2024-06-21

## 2024-06-27 ENCOUNTER — ANCILLARY PROCEDURE (OUTPATIENT)
Dept: CARDIOLOGY | Age: 74
End: 2024-06-27
Attending: INTERNAL MEDICINE

## 2024-06-27 DIAGNOSIS — Z95.0 PRESENCE OF CARDIAC PACEMAKER: ICD-10-CM

## 2024-06-27 DIAGNOSIS — E78.00 HYPERCHOLESTEROLEMIA: ICD-10-CM

## 2024-06-27 LAB
MDC_IDC_LEAD_CONNECTION_STATUS: NORMAL
MDC_IDC_LEAD_CONNECTION_STATUS: NORMAL
MDC_IDC_LEAD_IMPLANT_DT: NORMAL
MDC_IDC_LEAD_IMPLANT_DT: NORMAL
MDC_IDC_LEAD_LOCATION: NORMAL
MDC_IDC_LEAD_LOCATION: NORMAL
MDC_IDC_LEAD_LOCATION_DETAIL_1: NORMAL
MDC_IDC_LEAD_LOCATION_DETAIL_1: NORMAL
MDC_IDC_LEAD_MFG: NORMAL
MDC_IDC_LEAD_MFG: NORMAL
MDC_IDC_LEAD_MODEL: NORMAL
MDC_IDC_LEAD_MODEL: NORMAL
MDC_IDC_LEAD_POLARITY_TYPE: NORMAL
MDC_IDC_LEAD_POLARITY_TYPE: NORMAL
MDC_IDC_LEAD_SERIAL: NORMAL
MDC_IDC_LEAD_SERIAL: NORMAL
MDC_IDC_PG_IMPLANT_DTM: NORMAL
MDC_IDC_PG_MFG: NORMAL
MDC_IDC_PG_MODEL: NORMAL
MDC_IDC_PG_SERIAL: NORMAL
MDC_IDC_PG_TYPE: NORMAL
MDC_IDC_SESS_CLINIC_NAME: NORMAL
MDC_IDC_SESS_TYPE: NORMAL

## 2024-07-01 RX ORDER — EZETIMIBE 10 MG/1
10 TABLET ORAL DAILY
Qty: 90 TABLET | Refills: 3 | Status: SHIPPED | OUTPATIENT
Start: 2024-07-01 | End: 2025-06-26

## 2024-07-01 NOTE — TELEPHONE ENCOUNTER
REFILL PASSED PER [facility] PROTOCOLS    Requested Prescriptions   Pending Prescriptions Disp Refills    EZETIMIBE 10 MG Oral Tab [Pharmacy Med Name: Ezetimibe Oral Tablet 10 MG] 90 tablet 0     Sig: Take 1 tablet (10 mg total) by mouth daily.       Cholesterol Medication Protocol Passed - 6/27/2024  2:07 AM        Passed - ALT < 80     Lab Results   Component Value Date    ALT 23 11/21/2023             Passed - ALT resulted within past year        Passed - Lipid panel within past 12 months     Lab Results   Component Value Date    CHOLEST 200 (H) 11/21/2023    TRIG 135 11/21/2023    HDL 57 11/21/2023     (H) 11/21/2023    VLDL 24 11/21/2023    NONHDLC 143 (H) 11/21/2023             Passed - In person appointment or virtual visit in the past 12 mos or appointment in next 3 mos     Recent Outpatient Visits              5 months ago Eagle's syndrome    [facility], Perez Bowser MD    Office Visit    6 months ago [place]'s syndrome    [facility]    [facility]    6 months ago [facility]    7 months ago Abnormal [facility]onystagmogram (VNG)    [facility]    [facility]    7 months ago Pre-syncope    [facility]    [facility]                             Recent Outpatient Visits              5 months ago Eagle's syndrome    [facility]    [facility]    6 months ago [place]'s syndrome    [facility]    [facility]    6 months ago Third degree heart block [facility]HCC)    [facility]    [facility] Visit    7 months  ago Abnormal videonystagmogram (VNG)    [facility], Yannick Gardner MD    Office Visit    7 months ago Pre-syncope    [facility], [facility] Catalan Laura, MD    Office Visit

## 2024-07-17 RX ORDER — ATORVASTATIN CALCIUM 20 MG/1
20 TABLET, FILM COATED ORAL DAILY
Qty: 90 TABLET | Refills: 3 | Status: SHIPPED | OUTPATIENT
Start: 2024-07-17

## 2024-07-17 NOTE — TELEPHONE ENCOUNTER
Refill passed per [facility] protocol.    Requested Prescriptions   Pending Prescriptions Disp Refills    ATORVASTATIN 20 MG Oral Tab [Pharmacy Med Name: Atorvastatin Calcium Oral Tablet 20 MG] 90 tablet 0     Sig: TAKE ONE TABLET BY MOUTH ONE TIME DAILY       Cholesterol Medication Protocol Passed - 7/15/2024 12:45 AM        Passed - ALT < 80     Lab Results   Component Value Date    ALT 23 11/21/2023             Passed - ALT resulted within past year        Passed - Lipid panel within past 12 months     Lab Results   Component Value Date    CHOLEST 200 (H) 11/21/2023    TRIG 135 11/21/2023    HDL 57 11/21/2023     (H) 11/21/2023    VLDL 24 11/21/2023    NONHDLC 143 (H) 11/21/2023             Passed - In person appointment or virtual visit in the past 12 mos or appointment in next 3 mos     Recent Outpatient Visits              6 months ago [facility]'s [facility]    [facility]    7 months ago Eagle's syndrome    [facility] Perez Bowser MD    Office Visit    7 months ago [facility]    [facility]    7 months ago Abnormal [facility]onystagmogram (VNG)    [facility]    [facility]    8 months ago Pre-syncope    [facility], [place] Eliza Child Laura, MD    Office Visit                           Recent Outpatient Visits              6 months ago Eagle's syndrome    [facility]    [facility]    7 months ago Eagle's syndrome    [facility]    [facility]    7 months ago Third degree heart block (HCC)    [facility]     Office Visit    7 months ago Abnormal videonystagmogram (VNG)    [facility], Yannick Gardner MD    Office Visit    8 months ago Pre-syncope    [facility], [facility] Catalan Laura, MD    Office Visit

## 2024-07-23 ENCOUNTER — OFFICE VISIT (OUTPATIENT)
Dept: CARDIOLOGY | Age: 74
End: 2024-07-23

## 2024-07-23 ENCOUNTER — ANCILLARY PROCEDURE (OUTPATIENT)
Dept: CARDIOLOGY | Age: 74
End: 2024-07-23
Attending: INTERNAL MEDICINE

## 2024-07-23 VITALS
BODY MASS INDEX: 23.87 KG/M2 | OXYGEN SATURATION: 98 % | HEIGHT: 69 IN | DIASTOLIC BLOOD PRESSURE: 78 MMHG | SYSTOLIC BLOOD PRESSURE: 106 MMHG | HEART RATE: 75 BPM | WEIGHT: 161.16 LBS

## 2024-07-23 DIAGNOSIS — Z95.0 PRESENCE OF CARDIAC PACEMAKER: Primary | ICD-10-CM

## 2024-07-23 DIAGNOSIS — Z95.0 PRESENCE OF CARDIAC PACEMAKER: ICD-10-CM

## 2024-07-23 PROCEDURE — 93280 PM DEVICE PROGR EVAL DUAL: CPT | Performed by: INTERNAL MEDICINE

## 2024-07-24 LAB
MDC_IDC_LEAD_CONNECTION_STATUS: NORMAL
MDC_IDC_LEAD_CONNECTION_STATUS: NORMAL
MDC_IDC_LEAD_IMPLANT_DT: NORMAL
MDC_IDC_LEAD_IMPLANT_DT: NORMAL
MDC_IDC_LEAD_LOCATION: NORMAL
MDC_IDC_LEAD_LOCATION: NORMAL
MDC_IDC_LEAD_LOCATION_DETAIL_1: NORMAL
MDC_IDC_LEAD_LOCATION_DETAIL_1: NORMAL
MDC_IDC_LEAD_MFG: NORMAL
MDC_IDC_LEAD_MFG: NORMAL
MDC_IDC_LEAD_MODEL: NORMAL
MDC_IDC_LEAD_MODEL: NORMAL
MDC_IDC_LEAD_POLARITY_TYPE: NORMAL
MDC_IDC_LEAD_POLARITY_TYPE: NORMAL
MDC_IDC_LEAD_SERIAL: NORMAL
MDC_IDC_LEAD_SERIAL: NORMAL
MDC_IDC_MSMT_BATTERY_DTM: NORMAL
MDC_IDC_MSMT_BATTERY_REMAINING_LONGEVITY: 174 MO
MDC_IDC_MSMT_BATTERY_REMAINING_PERCENTAGE: 100 %
MDC_IDC_MSMT_LEADCHNL_RA_IMPEDANCE_VALUE: 548 OHM
MDC_IDC_MSMT_LEADCHNL_RA_PACING_THRESHOLD_AMPLITUDE: 0.9 V
MDC_IDC_MSMT_LEADCHNL_RA_PACING_THRESHOLD_PULSEWIDTH: 0.4 MS
MDC_IDC_MSMT_LEADCHNL_RV_IMPEDANCE_VALUE: 763 OHM
MDC_IDC_MSMT_LEADCHNL_RV_PACING_THRESHOLD_AMPLITUDE: 0.7 V
MDC_IDC_MSMT_LEADCHNL_RV_PACING_THRESHOLD_PULSEWIDTH: 0.4 MS
MDC_IDC_PG_IMPLANT_DTM: NORMAL
MDC_IDC_PG_MFG: NORMAL
MDC_IDC_PG_MODEL: NORMAL
MDC_IDC_PG_SERIAL: NORMAL
MDC_IDC_PG_TYPE: NORMAL
MDC_IDC_SESS_CLINIC_NAME: NORMAL
MDC_IDC_SESS_DTM: NORMAL
MDC_IDC_SESS_TYPE: NORMAL
MDC_IDC_SET_BRADY_AT_MODE_SWITCH_MODE: NORMAL
MDC_IDC_SET_BRADY_AT_MODE_SWITCH_RATE: 170 {BEATS}/MIN
MDC_IDC_SET_BRADY_LOWRATE: 60 {BEATS}/MIN
MDC_IDC_SET_BRADY_MAX_SENSOR_RATE: 130 {BEATS}/MIN
MDC_IDC_SET_BRADY_MAX_TRACKING_RATE: 130 {BEATS}/MIN
MDC_IDC_SET_BRADY_MODE: NORMAL
MDC_IDC_SET_BRADY_PAV_DELAY_HIGH: 170 MS
MDC_IDC_SET_BRADY_PAV_DELAY_LOW: 180 MS
MDC_IDC_SET_BRADY_SAV_DELAY_HIGH: 140 MS
MDC_IDC_SET_BRADY_SAV_DELAY_LOW: 150 MS
MDC_IDC_SET_LEADCHNL_RA_PACING_AMPLITUDE: 2 V
MDC_IDC_SET_LEADCHNL_RA_PACING_CAPTURE_MODE: NORMAL
MDC_IDC_SET_LEADCHNL_RA_PACING_POLARITY: NORMAL
MDC_IDC_SET_LEADCHNL_RA_PACING_PULSEWIDTH: 0.4 MS
MDC_IDC_SET_LEADCHNL_RA_SENSING_ADAPTATION_MODE: NORMAL
MDC_IDC_SET_LEADCHNL_RA_SENSING_POLARITY: NORMAL
MDC_IDC_SET_LEADCHNL_RA_SENSING_SENSITIVITY: 0.75 MV
MDC_IDC_SET_LEADCHNL_RV_PACING_AMPLITUDE: 1.2 V
MDC_IDC_SET_LEADCHNL_RV_PACING_CAPTURE_MODE: NORMAL
MDC_IDC_SET_LEADCHNL_RV_PACING_POLARITY: NORMAL
MDC_IDC_SET_LEADCHNL_RV_PACING_PULSEWIDTH: 0.4 MS
MDC_IDC_SET_LEADCHNL_RV_SENSING_ADAPTATION_MODE: NORMAL
MDC_IDC_SET_LEADCHNL_RV_SENSING_POLARITY: NORMAL
MDC_IDC_SET_LEADCHNL_RV_SENSING_SENSITIVITY: 2.5 MV
MDC_IDC_SET_ZONE_DETECTION_INTERVAL: 375 MS
MDC_IDC_SET_ZONE_STATUS: NORMAL
MDC_IDC_SET_ZONE_TYPE: NORMAL
MDC_IDC_SET_ZONE_VENDOR_TYPE: NORMAL
MDC_IDC_STAT_AT_BURDEN_PERCENT: 0 %
MDC_IDC_STAT_AT_DTM_END: NORMAL
MDC_IDC_STAT_AT_DTM_START: NORMAL
MDC_IDC_STAT_BRADY_DTM_END: NORMAL
MDC_IDC_STAT_BRADY_DTM_START: NORMAL
MDC_IDC_STAT_BRADY_RA_PERCENT_PACED: 1 %
MDC_IDC_STAT_BRADY_RV_PERCENT_PACED: 70 %
MDC_IDC_STAT_EPISODE_RECENT_COUNT: 0
MDC_IDC_STAT_EPISODE_RECENT_COUNT_DTM_END: NORMAL
MDC_IDC_STAT_EPISODE_RECENT_COUNT_DTM_START: NORMAL
MDC_IDC_STAT_EPISODE_TYPE: NORMAL
MDC_IDC_STAT_EPISODE_VENDOR_TYPE: NORMAL
MDC_IDC_STAT_EPISODE_VENDOR_TYPE: NORMAL

## 2024-08-08 ENCOUNTER — TELEPHONE (OUTPATIENT)
Dept: INTERNAL MEDICINE CLINIC | Facility: CLINIC | Age: 74
End: 2024-08-08

## 2024-08-08 ENCOUNTER — NURSE TRIAGE (OUTPATIENT)
Dept: INTERNAL MEDICINE CLINIC | Facility: CLINIC | Age: 74
End: 2024-08-08

## 2024-08-08 NOTE — TELEPHONE ENCOUNTER
Spoke to patient (name and  of patient verified). He is calling to report plan from cardiology to have echocardiogram and CTA and possibly update pacemaker based on test results. Advised patient to schedule tests per cardiology and follow up with Dr. Rucker. He states he will, just awaiting prior authorization.  Also reports receiving a bill from anesthesiology after colonoscopy. Advised patient to call [facility] billing at [phone] regarding billing questions, verbalizes understanding.    Per progress note by Dr. Rucker 24 accessed via Care Everywhere:  \"1. Presence of cardiac pacemaker  74-year-old male with complete heart block status post [employer] dual-chamber pacer presents for follow-up.  Plan:  1. Given his symptoms we will update his echocardiogram as we will get a coronary CTA.  2. If his EF shows that it is dropping a little bit may potentially benefit from a BiV pacer versus left bundle branch pacing upgrade.  3. If the above testing is normal I will see him back in 1 year.  Kvng Rucker MD  24  Electronically signed by Kvng Rucker MD at 2024 2:14 PM CDT\"

## 2024-08-08 NOTE — TELEPHONE ENCOUNTER
Action Requested: Summary for Provider     []  Critical Lab, Recommendations Needed  [] Need Additional Advice  []   FYI    []   Need Orders  [] Need Medications Sent to Pharmacy  []  Other     SUMMARY: Per protocol disposition advised See Today or Tomorrow in Office. Patient requests appointment at [place] office. Next available appointment scheduled:  Future Appointments   Date Time Provider Department Center   8/12/2024 11:40 AM Leni Morales APRN [facility]     Reason for call: Shoulder Injury  Onset: 4 days ago    Fell on left arm and hand at home 4 days ago. Pain when moving left arm, otherwise feels ok. Pain was severe at first, now moderate with movement.  Denies: severe pain, and protocol questions marked with \"no\"    Reason for Disposition   MODERATE pain (e.g., interferes with normal activities) and high-risk adult (e.g., age > 60 years, osteoporosis, chronic steroid use)    Protocols used: Shoulder Injury-A-OH

## 2024-08-12 ENCOUNTER — HOSPITAL ENCOUNTER (OUTPATIENT)
Dept: GENERAL RADIOLOGY | Facility: HOSPITAL | Age: 74
Discharge: HOME | End: 2024-08-12
Attending: NURSE PRACTITIONER
Payer: COMMERCIAL

## 2024-08-12 ENCOUNTER — OFFICE VISIT (OUTPATIENT)
Dept: INTERNAL MEDICINE CLINIC | Facility: CLINIC | Age: 74
End: 2024-08-12
Payer: COMMERCIAL

## 2024-08-12 VITALS
RESPIRATION RATE: 16 BRPM | BODY MASS INDEX: 23.85 KG/M2 | SYSTOLIC BLOOD PRESSURE: 129 MMHG | HEIGHT: 69 IN | DIASTOLIC BLOOD PRESSURE: 77 MMHG | WEIGHT: 161 LBS | HEART RATE: 103 BPM

## 2024-08-12 DIAGNOSIS — M25.512 ACUTE PAIN OF LEFT SHOULDER: ICD-10-CM

## 2024-08-12 DIAGNOSIS — M25.512 ACUTE PAIN OF LEFT SHOULDER: Primary | ICD-10-CM

## 2024-08-12 DIAGNOSIS — Z95.0 STATUS POST PLACEMENT OF CARDIAC PACEMAKER: ICD-10-CM

## 2024-08-12 DIAGNOSIS — I25.10 CORONARY ARTERY DISEASE INVOLVING NATIVE CORONARY ARTERY OF NATIVE HEART WITHOUT ANGINA PECTORIS: ICD-10-CM

## 2024-08-12 PROCEDURE — 3074F SYST BP LT 130 MM HG: CPT | Performed by: NURSE PRACTITIONER

## 2024-08-12 PROCEDURE — 3008F BODY MASS INDEX DOCD: CPT | Performed by: NURSE PRACTITIONER

## 2024-08-12 PROCEDURE — 99214 OFFICE O/P EST MOD 30 MIN: CPT | Performed by: NURSE PRACTITIONER

## 2024-08-12 PROCEDURE — 3078F DIAST BP <80 MM HG: CPT | Performed by: NURSE PRACTITIONER

## 2024-08-12 PROCEDURE — 73030 X-RAY EXAM OF SHOULDER: CPT | Performed by: NURSE PRACTITIONER

## 2024-08-12 NOTE — PROGRESS NOTES
Tarik Turpin is a 74 year old male.  Chief Complaint   Patient presents with    Pain     Left shoulder pain fell on 08-04-24 fell on shoulder     HPI:   He presents after having a fall at home on 8/4. He had a watering can in his right hand and he was trying to open the door with his left hand. He slipped on a throw rug. He hit his left shoulder on his brick wall and fell on his butt. No LOC. He did not hit his head.     He rates the pain at rest a 2/10.   He has decreased ROM.   He took Advil for his pain the first couple nights after the incidence with some relief but has not taken it recently.     He states there has been some improvement in his pain since his fall.     He has a pacemaker which was placed in November 2023.     He states he is having SOB with exertion. He did see cardiology and they recommended CTA and 2D Echo. He does need to schedule imaging.     Current Outpatient Medications   Medication Sig Dispense Refill    atorvastatin 20 MG Oral Tab Take 1 tablet (20 mg total) by mouth daily. 90 tablet 3    ezetimibe 10 MG Oral Tab Take 1 tablet (10 mg total) by mouth daily. 90 tablet 3    montelukast 10 MG Oral Tab Take 1 tablet (10 mg total) by mouth nightly. 90 tablet 3    lisinopril 10 MG Oral Tab Take 1 tablet (10 mg total) by mouth daily.      aspirin 81 MG Oral Tab EC Take 1 tablet (81 mg total) by mouth daily.      Vitamins-Lipotropics (LIPO FLAVONOID PLUS OR) Take by mouth.      Calcium Carbonate-Vitamin D (CALTRATE 600+D OR) Take by mouth nightly.      Probiotic Product (PROBIOTIC DAILY OR) Take by mouth.        Past Medical History:    Acid reflux    Age-related nuclear cataract of both eyes    Back problem    CAD (coronary artery disease)    COPD (chronic obstructive pulmonary disease) ([facility])    Decreased lung capacity    Deviated septum    Esophageal reflux    Floaters, right    Fractured nasal bones    Hearing difficulty    High blood pressure    High cholesterol    Macular degeneration     POAG (primary open-angle glaucoma)    Started Latanoprost in both eyes at bedtime- RJM    Shortness of breath    with exertion    Tubular adenoma of colon    polyps removed; if interested in continuing screening, next in 2029    Visual impairment    Glasses      Past Surgical History:   Procedure Laterality Date    Angiogram      Was told he had a blockage     Cardiac pacemaker placement      [employer] per patient    Colonoscopy      [facility] about 5 yrs ago    Colonoscopy N/A 08/20/2019    Procedure: COLONOSCOPY;  Surgeon: IRINEO Villasenor MD;  Location: [facility] ENDOSCOPY    Colonoscopy N/A 4/23/2024    Procedure: COLONOSCOPY;  Surgeon: IRINEO Villasenor MD;  Location: [facility] ENDOSCOPY    Egd      Other surgical history      nasal surgery      Social History:  Social History     Socioeconomic History    Marital status:    Tobacco Use    Smoking status: Never    Smokeless tobacco: Never   Vaping Use    Vaping status: Never Used   Substance and Sexual Activity    Alcohol use: Never    Drug use: Never    Sexual activity: Not Currently   Other Topics Concern    Caffeine Concern No    Exercise No   Social History Narrative    The patient does not use an assistive device..      The patient does live in a home with stairs.     Social Determinants of Health     Financial Resource Strain: Low Risk  (11/25/2023)    Received from [facility], [facility]    Financial Resource Strain     In the past year, have you or any family members you live with been unable to get any of the following when it was really needed? Check all that apply.: None   Food Insecurity: Not At Risk (11/25/2023)    Received from [facility], [facility]    Food Insecurity     RETIRE How often in the past 12 months would you say you are worried or stressed about having enough money to buy nutritious meals? : Never   Transportation Needs: No Transportation Needs (11/25/2023)    [facility], [facility], [facility]    PRAPARE - Transportation     In the past 12 months, has lack of transportation kept you from medical appointments or from getting medications?: No     In the past 12 months, has lack of transportation kept you from meetings, work, or from getting things needed for daily living?: No   Physical Activity: Medium Risk (7/23/2024)    Received from [facility]    Exercise Vital Sign     On average, how many days per week do you engage in moderate to strenuous exercise (like a brisk walk)?: 1 day     On average, how many minutes do you engage in exercise at this level?: 60 min   Social Connections: Low Risk  (11/25/2023)    Received from [facility], [facility]    Social Connections     How often do you see or talk to people that you care about and feel close to? (For example: talking to friends on the phone, visiting friends or family, going to [employer] or club meetings): 5 or more times a week      Family History   Problem Relation Age of Onset    Diabetes Father     Cancer Sister         stomach cancer    No Known Problems Sister     Cancer Brother     Glaucoma Neg     Macular degeneration Neg       No Known Allergies     REVIEW OF SYSTEMS:     Review of Systems   Constitutional:  Negative for fever.   HENT: Negative.     Respiratory:  Negative for cough, shortness of breath and wheezing.    Cardiovascular:  Negative for chest pain.   Gastrointestinal: Negative.    Genitourinary: Negative.    Musculoskeletal:  Positive for arthralgias (left shoulder).   Skin: Negative.    Neurological: Negative.    Psychiatric/Behavioral: Negative.        Wt Readings from Last 5 Encounters:   08/12/24 161 lb (73 kg)   04/17/24 160 lb (72.6 kg)   12/08/23 168 lb (76.2 kg)   11/22/23 165 lb (74.8 kg)   11/20/23 165 lb (74.8 kg)     Body mass index is 23.78 kg/m².      EXAM:   /77   Pulse 103   Resp 16   Ht 5'  9\" (1.753 m)   Wt 161 lb (73 kg)   BMI 23.78 kg/m²     Physical Exam  Vitals reviewed.   Constitutional:       Appearance: Normal appearance.   HENT:      Head: Normocephalic.   Cardiovascular:      Rate and Rhythm: Normal rate and regular rhythm.      Pulses: Normal pulses.   Pulmonary:      Breath sounds: Normal breath sounds. No wheezing.   Musculoskeletal:         General: No swelling. Normal range of motion.   Skin:     General: Skin is warm and dry.   Neurological:      Mental Status: He is alert and oriented to person, place, and time.   Psychiatric:         Mood and Affect: Mood normal.         Behavior: Behavior normal.            ASSESSMENT AND PLAN:   1. Acute pain of left shoulder  - ok to continue Advil and or tylenol as needed for pain  - based on results patient may benefit from PT and or ortho consult.   - XR SHOULDER, COMPLETE (MIN 2 VIEWS), LEFT (CPT=73030); Future    2. Coronary artery disease involving native coronary artery of native heart without angina pectoris   - per patient he has been experiencing SOB with exertion. He discussed this with his cardiology Dr Rucker and he ordered a CTA and 2D Echo. Patient needs to schedule the testing.     3. Status post placement of cardiac pacemaker   - following with cardiology   - recently saw Dr Rucker      The patient indicates understanding of these issues and agrees to the plan.  Return for if symptoms do not resolve.

## 2024-08-12 NOTE — PATIENT INSTRUCTIONS
Ok to take advil as needed for pain.     Based on imaging results you may benefit from PT and or orthopedic evaluation.

## 2024-09-05 ENCOUNTER — TELEPHONE (OUTPATIENT)
Dept: INTERNAL MEDICINE CLINIC | Facility: CLINIC | Age: 74
End: 2024-09-05

## 2024-09-05 DIAGNOSIS — E78.00 HYPERCHOLESTEROLEMIA: ICD-10-CM

## 2024-09-05 DIAGNOSIS — I25.10 CORONARY ARTERY DISEASE INVOLVING NATIVE CORONARY ARTERY OF NATIVE HEART WITHOUT ANGINA PECTORIS: Primary | ICD-10-CM

## 2024-09-05 NOTE — TELEPHONE ENCOUNTER
Patient was referred by the doctor to Dr. Rucker at [facility] for his heart condition.   Jaylyn was recently told by our referral department that the patient needs to see someone at [place] for this treatment.  Jaylyn stated if the patient wants to continue to see Dr. Rucker, the referral needs to be approved by our doctor.

## 2024-09-06 ENCOUNTER — TELEPHONE (OUTPATIENT)
Dept: CT IMAGING | Age: 74
End: 2024-09-06

## 2024-09-06 NOTE — TELEPHONE ENCOUNTER
If this condition is currently being managed by an outside physician then he should most likely still go there for continuity of care.  I am not sure what my role is regarding this

## 2024-09-06 NOTE — TELEPHONE ENCOUNTER
Referral pended  Dr. Jack Rucker  [address] [facility], [address]  [place], [address] [place] [address] # [phone]  Fax# [phone]  From message patient wants to continue with Dr. Rucker.  Patient has HMO insurance therefore needs refferal from PCP.          Coronary artery disease involving native coronary artery of native heart without angina pectoris I25.10     Hypercholesterolemia E78.00

## 2024-09-16 ENCOUNTER — TELEPHONE (OUTPATIENT)
Dept: ADMINISTRATIVE | Age: 74
End: 2024-09-16

## 2024-09-16 DIAGNOSIS — I44.2 THIRD DEGREE HEART BLOCK (HCC): Primary | ICD-10-CM

## 2024-09-16 DIAGNOSIS — Z95.0 STATUS POST PLACEMENT OF CARDIAC PACEMAKER: ICD-10-CM

## 2024-09-16 DIAGNOSIS — I25.10 CORONARY ARTERY DISEASE INVOLVING NATIVE CORONARY ARTERY OF NATIVE HEART WITHOUT ANGINA PECTORIS: ICD-10-CM

## 2024-09-16 NOTE — TELEPHONE ENCOUNTER
Hello,    This request has been closed by [employer].    [employer] has requested a redirection to [facility] provider.  Provider suggestion from [employer] are:  Dr. JESSIE Jorgensen.  Thank you  Tracey

## 2024-09-19 ENCOUNTER — APPOINTMENT (OUTPATIENT)
Dept: CARDIOLOGY | Age: 74
End: 2024-09-19
Attending: INTERNAL MEDICINE

## 2024-09-19 ENCOUNTER — APPOINTMENT (OUTPATIENT)
Dept: CT IMAGING | Age: 74
End: 2024-09-19
Attending: INTERNAL MEDICINE

## 2024-10-01 ENCOUNTER — TELEPHONE (OUTPATIENT)
Dept: CARDIOLOGY | Age: 74
End: 2024-10-01

## 2024-10-10 ENCOUNTER — TELEPHONE (OUTPATIENT)
Dept: CARDIOLOGY | Age: 74
End: 2024-10-10

## 2024-11-08 ENCOUNTER — ORDER TRANSCRIPTION (OUTPATIENT)
Dept: ADMINISTRATIVE | Facility: HOSPITAL | Age: 74
End: 2024-11-08

## 2024-11-08 DIAGNOSIS — R94.39 ABNORMAL NUCLEAR STRESS TEST: Primary | ICD-10-CM

## 2024-12-09 RX ORDER — LISINOPRIL 10 MG/1
10 TABLET ORAL DAILY
Qty: 30 TABLET | Refills: 11 | Status: CANCELLED | OUTPATIENT
Start: 2024-12-09 | End: 2025-12-09

## 2024-12-09 NOTE — TELEPHONE ENCOUNTER
Per fax from pharmacy requesting refill     Medication Quantity Refills Start End   lisinopril 10 MG Oral Tab () -- -- 2023   Sig:   Take 1 tablet (10 mg total) by mouth daily.     Route:   Oral

## 2024-12-12 ENCOUNTER — PATIENT MESSAGE (OUTPATIENT)
Dept: INTERNAL MEDICINE CLINIC | Facility: CLINIC | Age: 74
End: 2024-12-12

## 2024-12-12 NOTE — TELEPHONE ENCOUNTER
[facility] pharmacy calling to check on status of lisinopril refill request, verified patient name and date of birth.  Advised pharmacist that lisnopril is not on patient active or historical med list in Epic.  She reports it was prescribed by Dr Lew. She will contact patient for more information.

## 2024-12-13 ENCOUNTER — TELEPHONE (OUTPATIENT)
Dept: INTERNAL MEDICINE CLINIC | Facility: CLINIC | Age: 74
End: 2024-12-13

## 2024-12-13 NOTE — TELEPHONE ENCOUNTER
Patient called back for status of his refill - he stated he has never seen Dr. Lew and us not aware why they were prescribing the medication he thought it was managed by his primary. it was explained to the patient that this medication has previously been managed by a cardiologist and therefore should be managed by cardiology. Lisinopril is not on list of current of previous medications. Patient is going to call [facility] to see if they will refill the medication and then call us back or send my chart message.

## 2025-02-03 ENCOUNTER — HOSPITAL ENCOUNTER (OUTPATIENT)
Dept: CT IMAGING | Facility: HOSPITAL | Age: 75
Discharge: HOME | End: 2025-02-03
Attending: INTERNAL MEDICINE
Payer: COMMERCIAL

## 2025-02-03 VITALS — HEIGHT: 69 IN | BODY MASS INDEX: 23.55 KG/M2 | WEIGHT: 159 LBS

## 2025-02-03 DIAGNOSIS — R94.39 ABNORMAL NUCLEAR STRESS TEST: ICD-10-CM

## 2025-02-03 LAB
CREAT BLD-MCNC: 1 MG/DL
EGFRCR SERPLBLD CKD-EPI 2021: 78 ML/MIN/1.73M2 (ref 60–?)

## 2025-02-03 PROCEDURE — 75580 N-INVAS EST C FFR SW ALY CTA: CPT | Performed by: INTERNAL MEDICINE

## 2025-02-03 PROCEDURE — 82565 ASSAY OF CREATININE: CPT

## 2025-02-03 PROCEDURE — 75572 CT HRT W/3D IMAGE: CPT | Performed by: INTERNAL MEDICINE

## 2025-02-03 RX ORDER — NITROGLYCERIN 0.4 MG/1
TABLET SUBLINGUAL
Status: COMPLETED
Start: 2025-02-03 | End: 2025-02-03

## 2025-02-03 RX ORDER — METOPROLOL TARTRATE 25 MG/1
50 TABLET, FILM COATED ORAL ONCE AS NEEDED
Status: COMPLETED | OUTPATIENT
Start: 2025-02-03 | End: 2025-02-03

## 2025-02-03 RX ORDER — METOPROLOL TARTRATE 25 MG/1
TABLET, FILM COATED ORAL
Status: DISPENSED
Start: 2025-02-03 | End: 2025-02-03

## 2025-02-03 RX ORDER — METOPROLOL TARTRATE 1 MG/ML
5 INJECTION, SOLUTION INTRAVENOUS SEE ADMIN INSTRUCTIONS
Status: DISCONTINUED | OUTPATIENT
Start: 2025-02-03 | End: 2025-02-05

## 2025-02-03 RX ORDER — METOPROLOL TARTRATE 25 MG/1
TABLET, FILM COATED ORAL
Status: COMPLETED
Start: 2025-02-03 | End: 2025-02-03

## 2025-02-03 RX ORDER — NITROGLYCERIN 0.4 MG/1
0.4 TABLET SUBLINGUAL ONCE
Status: COMPLETED | OUTPATIENT
Start: 2025-02-03 | End: 2025-02-03

## 2025-02-03 RX ORDER — METOPROLOL TARTRATE 25 MG/1
50 TABLET, FILM COATED ORAL ONCE AS NEEDED
Status: DISCONTINUED | OUTPATIENT
Start: 2025-02-03 | End: 2025-02-05

## 2025-02-03 RX ORDER — METOPROLOL TARTRATE 25 MG/1
100 TABLET, FILM COATED ORAL ONCE AS NEEDED
Status: DISCONTINUED | OUTPATIENT
Start: 2025-02-03 | End: 2025-02-05

## 2025-02-03 RX ORDER — METOPROLOL TARTRATE 25 MG/1
100 TABLET, FILM COATED ORAL ONCE AS NEEDED
Status: COMPLETED | OUTPATIENT
Start: 2025-02-03 | End: 2025-02-03

## 2025-02-03 RX ORDER — DILTIAZEM HYDROCHLORIDE 5 MG/ML
5 INJECTION INTRAVENOUS SEE ADMIN INSTRUCTIONS
Status: DISCONTINUED | OUTPATIENT
Start: 2025-02-03 | End: 2025-02-05

## 2025-02-03 RX ADMIN — NITROGLYCERIN 0.4 MG: 0.4 TABLET SUBLINGUAL at 11:21:00

## 2025-02-03 RX ADMIN — METOPROLOL TARTRATE 100 MG: 25 TABLET, FILM COATED ORAL at 10:28:00

## 2025-02-03 NOTE — IMAGING NOTE
TO RAD HOLDING AT 1003      HX TAKEN: SOB W EXERTION, pulmonary vein, pre-ablation    PT CONSENTED     BASELINE VITAL SIGNS: HR 79  /89 BMI 23.6    CTA ORDERED BY DR SPENCER WAS PT GIVEN CTA PREMEDS NO    Patient states he has a [employer] pacemaker, which is not documented in epic. This RN called [facility] for order to adjust settings. Settings adjusted remotely, HR set to 55. Pt has own intrinsic rate at 79.    METOPROLOL PO GIVEN 100 MILLIGRAMS  AT 1027    18 GAUGE IV STARTED AT 1100 POC TESTING COMPLETED GFR = 78   CREATINE = 1     TO CT TABLE @ 1120    CONNECT TO MONITOR  HR 57 /70      NITROGLYCERIN 0.4 MILLIGRAMS SUBLINGUAL GIVEN AT 1121      INJECTION STARTED AT 1130 HR 55 DURING SCAN PROCEDURE COMPLETE    POST SCAN HR 59 /67 AT 1132    PT TO HOLDING AREA  VS: Q 15 MIN X2   HR 58 /76  1150: Pacemaker returned to original settings remotely with [employer].  HR 60 /64    AVS  PROVIDED      1200 DISCHARGED HOME

## 2025-03-24 NOTE — H&P
Tarik Turpin is a 74 year old male with abnormal stress suggestive of apical defect and CCTA with severe LAD, liekly significant circumflex disease referred for coronary angiogram with possible intervention. H&P reviewed and there are no significant changes. Indications, risks, benefits and alternate options reviewed with the patient and they are agreeable to proceed with the procedure.    Mallampati 2  ASA 3    Dr. Leandro Bob       declines

## 2025-03-28 NOTE — TELEPHONE ENCOUNTER
Talked to patient offered him an appointment but he says he will make the appointment thru his MyChart.

## 2025-03-28 NOTE — TELEPHONE ENCOUNTER
Please Review. Protocol Failed; No Protocol   Mycharted patient to schedule an appointment.   Sent to Patient  to call patient to schedule an appointment

## 2025-03-31 RX ORDER — LISINOPRIL 10 MG/1
10 TABLET ORAL NIGHTLY
COMMUNITY
Start: 2024-09-20

## 2025-04-01 ENCOUNTER — MED REC SCAN ONLY (OUTPATIENT)
Dept: INTERNAL MEDICINE CLINIC | Facility: CLINIC | Age: 75
End: 2025-04-01

## 2025-04-07 ENCOUNTER — HOSPITAL ENCOUNTER (OUTPATIENT)
Dept: INTERVENTIONAL RADIOLOGY/VASCULAR | Facility: HOSPITAL | Age: 75
Discharge: HOME | End: 2025-04-07
Attending: INTERNAL MEDICINE | Admitting: INTERNAL MEDICINE
Payer: COMMERCIAL

## 2025-04-07 VITALS
RESPIRATION RATE: 19 BRPM | BODY MASS INDEX: 23.64 KG/M2 | HEART RATE: 67 BPM | DIASTOLIC BLOOD PRESSURE: 76 MMHG | SYSTOLIC BLOOD PRESSURE: 133 MMHG | OXYGEN SATURATION: 100 % | WEIGHT: 159.63 LBS | TEMPERATURE: 98 F | HEIGHT: 69 IN

## 2025-04-07 DIAGNOSIS — R93.1 ABNORMAL CARDIAC CT ANGIOGRAPHY: ICD-10-CM

## 2025-04-07 LAB
AVOX TOTAL HEMOGLOBIN CONCENTRATION: 13.5 G/DL
AVOX TOTAL HEMOGLOBIN CONCENTRATION: 13.6 G/DL
COHGB MFR BLD: 65.2 % (ref 65–80)
COHGB MFR BLD: 65.5 % (ref 65–80)
ISTAT ACTIVATED CLOTTING TIME: 273 SECONDS (ref 125–137)

## 2025-04-07 PROCEDURE — 99153 MOD SED SAME PHYS/QHP EA: CPT | Performed by: INTERNAL MEDICINE

## 2025-04-07 PROCEDURE — 92978 ENDOLUMINL IVUS OCT C 1ST: CPT | Performed by: INTERNAL MEDICINE

## 2025-04-07 PROCEDURE — B2111ZZ FLUOROSCOPY OF MULTIPLE CORONARY ARTERIES USING LOW OSMOLAR CONTRAST: ICD-10-PCS | Performed by: INTERNAL MEDICINE

## 2025-04-07 PROCEDURE — 85347 COAGULATION TIME ACTIVATED: CPT

## 2025-04-07 PROCEDURE — 93456 R HRT CORONARY ARTERY ANGIO: CPT | Performed by: INTERNAL MEDICINE

## 2025-04-07 PROCEDURE — 92979 ENDOLUMINL IVUS OCT C EA: CPT | Performed by: INTERNAL MEDICINE

## 2025-04-07 PROCEDURE — B241ZZ3 ULTRASONOGRAPHY OF MULTIPLE CORONARY ARTERIES, INTRAVASCULAR: ICD-10-PCS | Performed by: INTERNAL MEDICINE

## 2025-04-07 PROCEDURE — 99152 MOD SED SAME PHYS/QHP 5/>YRS: CPT | Performed by: INTERNAL MEDICINE

## 2025-04-07 PROCEDURE — 4A023N6 MEASUREMENT OF CARDIAC SAMPLING AND PRESSURE, RIGHT HEART, PERCUTANEOUS APPROACH: ICD-10-PCS | Performed by: INTERNAL MEDICINE

## 2025-04-07 RX ORDER — SODIUM CHLORIDE 9 MG/ML
3 INJECTION, SOLUTION INTRAVENOUS
Status: COMPLETED | OUTPATIENT
Start: 2025-04-07 | End: 2025-04-07

## 2025-04-07 RX ORDER — VERAPAMIL HYDROCHLORIDE 2.5 MG/ML
INJECTION, SOLUTION INTRAVENOUS
Status: COMPLETED
Start: 2025-04-07 | End: 2025-04-07

## 2025-04-07 RX ORDER — LIDOCAINE HYDROCHLORIDE 20 MG/ML
INJECTION, SOLUTION EPIDURAL; INFILTRATION; INTRACAUDAL; PERINEURAL
Status: COMPLETED
Start: 2025-04-07 | End: 2025-04-07

## 2025-04-07 RX ORDER — NITROGLYCERIN 20 MG/100ML
INJECTION INTRAVENOUS
Status: COMPLETED
Start: 2025-04-07 | End: 2025-04-07

## 2025-04-07 RX ORDER — ASPIRIN 81 MG/1
324 TABLET, CHEWABLE ORAL ONCE
Status: DISCONTINUED | OUTPATIENT
Start: 2025-04-07 | End: 2025-04-07

## 2025-04-07 RX ORDER — MIDAZOLAM HYDROCHLORIDE 1 MG/ML
INJECTION INTRAMUSCULAR; INTRAVENOUS
Status: COMPLETED
Start: 2025-04-07 | End: 2025-04-07

## 2025-04-07 RX ORDER — IOPAMIDOL 612 MG/ML
105 INJECTION, SOLUTION INTRAVASCULAR
Status: COMPLETED | OUTPATIENT
Start: 2025-04-07 | End: 2025-04-07

## 2025-04-07 RX ORDER — HEPARIN SODIUM 1000 [USP'U]/ML
INJECTION, SOLUTION INTRAVENOUS; SUBCUTANEOUS
Status: COMPLETED
Start: 2025-04-07 | End: 2025-04-07

## 2025-04-07 RX ORDER — CHLORHEXIDINE GLUCONATE 40 MG/ML
SOLUTION TOPICAL
Status: DISCONTINUED | OUTPATIENT
Start: 2025-04-07 | End: 2025-04-07

## 2025-04-07 RX ADMIN — IOPAMIDOL 105 ML: 612 INJECTION, SOLUTION INTRAVASCULAR at 12:00:00

## 2025-04-07 RX ADMIN — SODIUM CHLORIDE 3 ML/KG/HR: 9 INJECTION, SOLUTION INTRAVENOUS at 10:15:00

## 2025-04-07 NOTE — DISCHARGE INSTRUCTIONS
Transradial Angiogram Discharge Instructions    The following instructions are for patient who have had an angiogram, cardiac cath, angioplasty, or stent through the radial artery.    Proper skin puncture site care is important during the healing period. This guideline should help to remind you of the verbal instructions you received from your physician or nurse.    Site: right    Site Care:  For 5 days after the procedure, make sure the wrist is not submerged in water or any liquid.  Leave bandage in place for 24 hours. Then, gently wash with soap and water. Do no put any other bandage, ointment, powders, or creams to the site.  For local swelling: apply ice  If bleeding occurs, elevate the hand above the heart and apply local pressure    Activity/Driving:  Avoid wrist flexion, extension, and fine motor activities (i.e. Texting, typing, using a computer mouse, etc.) for 24 hours.  Do not drive for 24 hours.  Do not lift or pull anything heavier than 10 pounnds with affected hand for 1 week.    Additional Instructions:  Do not take glucophage/metformin containing products for at least 48 hours after procedure, unless otherwise directed by your physician.    When to contact your physician  Call Dr. Bob at [phone] right away if you experience any of the following:    Swelling, pain, or bleeding at the site that is not relieved by applying ice or pressure  Signs of infection: redness, warmth, drainage at the site, chills, or temperature of 100.5 degrees or greater.  Changes in sensation, numbness, or tingling of affected hand

## 2025-04-07 NOTE — H&P (VIEW-ONLY)
[facility]  part of [facility]  [facility]  Report of Consultation    Tarik Turpin Patient Status:  Outpatient    1950 MRN [id number]   Location [facility] INTERVENTIONAL SUITES Attending No att. providers found   Hosp Day # 0 PCP Logan Hernandez MD     Date of Consult:  2025    Reason for Consultation:  Multivessel coronary artery disease.    History of Present Illness:  Tarik Turpin is a a(n) 74 year old male.  History of permanent pacemaker placed last year at [facility] due to complete heart block which precipitated by episodes of dizziness lightheadedness and blacking out.  Since his pacemaker he has done well but continues to endorse fatigue and shortness of breath and occasional dizziness and lightheadedness.  This ultimately prompted stress test which was abnormal and angiogram was recommended and performed today demonstrating a left dominant system with a 50% left main stenosis and diffuse disease in the left anterior descending artery and obtuse marginal arteries.  We are asked to evaluate for surgical vascularization.  Patient seen in Cath Lab holding area this wife at the bedside.    History:  Past Medical History:    Acid reflux    Age-related nuclear cataract of both eyes    Back problem    CAD (coronary artery disease)    COPD (chronic obstructive pulmonary disease) (HCC)    Decreased lung capacity    Deviated septum    Esophageal reflux    Floaters, right    Fractured nasal bones    Hearing difficulty    High blood pressure    High cholesterol    Macular degeneration    POAG (primary open-angle glaucoma)    Started Latanoprost in both eyes at bedtime- RJM    Shortness of breath    with exertion    Tubular adenoma of colon    polyps removed; if interested in continuing screening, next in     Visual impairment    Glasses     Past Surgical History:   Procedure Laterality Date    Angiogram      Was told he had a blockage      Cardiac pacemaker placement      [employer] per patient    Colonoscopy      [facility] about 5 yrs ago    Colonoscopy N/A 08/20/2019    Procedure: COLONOSCOPY;  Surgeon: IRINEO Villasenor MD;  Location: [facility] ENDOSCOPY    Colonoscopy N/A 4/23/2024    Procedure: COLONOSCOPY;  Surgeon: IRINEO Villasenor MD;  Location: [facility] ENDOSCOPY    Egd      Other surgical history      nasal surgery     Family History   Problem Relation Age of Onset    Diabetes Father     Cancer Sister         stomach cancer    No Known Problems Sister     Cancer Brother     Glaucoma Neg     Macular degeneration Neg       reports that he has never smoked. He has never used smokeless tobacco. He reports that he does not drink alcohol and does not use drugs.    Allergies:  Allergies[1]    Medications:    Current Facility-Administered Medications:     chlorhexidine (Hibiclens) 4 % external liquid, , Topical, On Call    aspirin chewable tab 324 mg, 324 mg, Oral, Once    Home medications are aspirin lisinopril statin Zetia montelukast and a seizure medicine for what sounds like trigeminal neuralgia although he does not under the name.    Review of Systems:  Pertinent items are noted in HPI.    Physical Exam:  Blood pressure 133/76, pulse 67, temperature 97.6 °F (36.4 °C), temperature source Oral, resp. rate 19, height 5' 9\" (1.753 m), weight 159 lb 9.6 oz (72.4 kg), SpO2 100%.    GENERAL: No acute distress.  VITAL SIGNS: See above.  HEENT: Trachea midline.  No jugular venous distention.  No carotid bruit.  CHEST: Chest wall is nontender.  HEART: Regular rate and rhythm without murmurs.  LUNGS: Clear to auscultation bilaterally.  ABDOMEN: Soft, nontender nondistended.  VASCULAR: Palpable radial artery pulses 2+ bilateral.  SKIN: No rash, no excessive bruising, petechiae, or purpura.  NEUROLOGIC: Cranial nerves II-XII intact without motor/sensory deficit.      Laboratory Data:  White blood cell count 12.1 hematocrit 46.9 platelet count 1 50,000  creatinine 0.88    Findings:  Coronaries:  Left main coronary artery: Large size vessel with 40 to 50% stenosis of the mid and distal segment.  Left anterior descending artery: Medium size, Type IV vessel with 80% stenosis proximal segment, 60-70% stenosis mid segment.  Circumflex artery: Large size dominant vessel with 40% mid circumflex, 60% proximal OM1, 70% distal circumflex lesion into LPDA.  Right coronary artery: Small size nondominant vessel with no angiographically significant disease.      RIGHT HEART CATHETERIZATION HEMODYNAMICS:  Right Atrial Pressure: 4/3/2 mmHg  Right Ventricular Pressure: 28/6/5 mmHg  Pulmonary Artery Pressure: 27/9/17  mmHg  Wedge Pressure: 10/9/8 mmHg  Rekha CO: 4.2 L/min; Rekha CI: 2.2 L/min/m²  Transpulmonary Gradient (PAmean - Wedge mean): 9 mmHg  Pulmonary Vascular Resistance (PA-wedge/CO): 2.2 Wood units  Ao saturation 96%  PA saturation 65%  Hgb 13.6  Heart Rate 65    Echocardiogram from 10/29/2024 within ejection fraction 45 to 50% left ventricle with areas of hypokinesis and akinesis in the septal apex and posterior lateral walls.  Normal right ventricular function.  Trace mitral digitation, trace aortic insufficiency      Impression and Plan:  Patient Active Problem List   Diagnosis    Numbness and tingling of foot    Centrilobular emphysema (HCC)    Gastroesophageal reflux disease without esophagitis    Macular degeneration of both eyes    Deviated nasal septum    Coronary artery disease involving native coronary artery of native heart without angina pectoris    Benign prostatic hyperplasia with nocturia    Hypercholesterolemia    Sensorineural hearing loss, bilateral    Glaucoma suspect of both eyes    Age-related nuclear cataract of both eyes    Floaters, right    Polyp of colon    POAG (primary open-angle glaucoma)    Thyroiditis    Odynophagia    Lymphadenopathy    Eagle's syndrome    Third degree heart block (HCC)    Status post placement of cardiac pacemaker     Screening for colon cancer    Diverticula of colon       74-year-old male with history of pacemaker, abnormal stress test and multivessel coronary artery disease.    Patient will benefit from surgical revascularization.  I had a long discussion with the patient, wife.  Using pictures, I explained the nature his heart disease.  I explained his current disease.  I explained treatement options of medical management, high risk PCI and surgical revascularization.  I explained the operation, median sternotomy, use of cardiopulmonary bypass machine, and cardiac arrest.  I explained the bypass operation, conduit selection, long term patency rates of mammary artery and reverse saphenous vein.  We discussed benefits. We discussed risks including but not limited to: bleeding, coagulopathy, transfusion (to which he agrees to accept after discussing risks of transfusion), infection, sternal dehiscence, prolonged ventilation, myocardial infarction, stroke, arrhythmia, atrial fibrillation, kidney injury, dialysis, multisystem organ dysfunction, imponderables and death.      Patient voiced understanding wish to proceed.  We have tentatively scheduled surgery for Wednesday, April 23, 2025 at 7 AM.  Will need carotid duplex to complete his preoperative workup.  Many of his and his wife's excellent questions are answered to their satisfaction.    Eulalio Hernandez MD  Cardiothoracic Surgery  [facility]     Time spent on counseling/coordination of care:  02983- 80 min    Total time spent with patient:  1 Hour    Eulalio Hernandez MD  4/7/2025  3:51 PM       [1] No Known Allergies

## 2025-04-07 NOTE — PROCEDURES
Post-Op Assessment Note    CV Status:  Stable    Pain management: adequate       Mental Status:  Alert and awake   Hydration Status:  Stable   PONV Controlled:  None   Airway Patency:  Patent     Post Op Vitals Reviewed: Yes    No anethesia notable event occurred.    Staff: CRNA           BP      Temp      Pulse     Resp      SpO2       [facility]  part of [facility] Cardiac Cath Procedure Note  Tarik Turpin Patient Status:  Outpatient    1950 MRN [id number]   Location [facility] INTERVENTIONAL SUITES Attending Leandro Bob DO   Hosp Day # 0 PCP Logan Hernandez MD       Proceduralist: Leandro Bob DO  Date of Procedure: 2025     Preoperative Diagnosis:  1) Shortness of breath  2) Abnormal stress test, abnormal coronary CTA    Postoperative Diagnosis:  1) Severe mid and distal left main, severe proximal and mid LAD disease    Procedures Performed:  1) Selective Left and Right Coronary angiogram  2) IVUS evaluation of left main and LAD  3) [facility]    Indication/History: Tarik Turpin is a 74 year old male with history of pacemaker placement, hypertension, hyperlipidemia referred for coronary catheterization with possible PCI indicated for evaluation of exertional dyspnea and abnormal noninvasive testing.  Stress test suggests apical ischemia and coronary CTA suggests diffuse proximal LAD disease which is hemodynamically significant by CT FFR and suggestion of circumflex disease as well.    Summary of Case: After written informed consent was obtained from the patient, patient was brought to the cardiac catheterization laboratory.  Patient was prepped and draped in the usual sterile fashion. Lidocaine 1% was used to infiltrate the right upper arm and wrist for local anesthesia and a 6 Chinese introducer sheath was inserted into the radial artery and 4/5 Chinese slender sheath into the right basilic vein using modified Seldinger technique.      Selective coronary angiography performed with 6 Chinese JR 5 catheter for RCA and 6 Chinese JL 3.5 catheter for LCA.  Angiography performed in standard projections.      Findings:  Coronaries:  Left main coronary artery: Large size vessel with 40 to 50% stenosis of the mid and distal segment.  Left anterior descending artery: Medium size, Type IV vessel with 80%  stenosis proximal segment, 60-70% stenosis mid segment.  Circumflex artery: Large size dominant vessel with 40% mid circumflex, 60% proximal OM1, 70% distal circumflex lesion into LPDA.  Right coronary artery: Small size nondominant vessel with no angiographically significant disease.     RIGHT HEART CATHETERIZATION HEMODYNAMICS:  Right Atrial Pressure: 4/3/2 mmHg  Right Ventricular Pressure: 28/6/5 mmHg  Pulmonary Artery Pressure: 27/9/17  mmHg  Wedge Pressure: 10/9/8 mmHg  Rekha CO: 4.2 L/min; Rekha CI: 2.2 L/min/m²  Transpulmonary Gradient (PAmean - Wedge mean): 9 mmHg  Pulmonary Vascular Resistance (PA-wedge/CO): 2.2 Wood units  Ao saturation 96%  PA saturation 65%  Hgb 13.6  Heart Rate 65        Specimen sent to: No specimen collected  Estimated blood loss: 10cc  Closure: Manual pressure on vein, TR band on artery      Lesion #1 IVUS left main  Lesion Characteristics- nontorturous, moderately calcified.  Type non-C lesion.      Guide Catheter: 6 Tajik EBU 3.5 guide  Wire: [place] blue  IVUS left main:  mid and distal left main MLA ranging between 5 and 7.5 mm²   IVUS proximal LAD: Distal left main disease extending into  ostial and proximal LAD resulting in severe disease with MLA less than 4 mm²    IV was maintained by RN and moderate conscious sedation of versed 2 mg and fentanyl 50 mcg were given.  Patient was assessed and monitoring of oxygen, heart rate and blood pressure by nurse and myself during the exam from 1116 to 1157.      Assessment and Plan:  74-year-old male presenting with shortness of breath and abnormal stress test, abnormal coronary CTA.  Right heart catheterization shows normal hemodynamics.  Coronary angiogram suggests bulky eccentric left main disease and severe LAD disease.  IVUS confirms  severe mid and distal left main disease with MLA ranging between 5 and 7 mm², severe proximal LAD, moderate mid LAD disease.  Severe distal circumflex/LPDA disease.  Will recommend CT surgery evaluation  for CABG.      Leandro Bob,   04/07/25

## 2025-04-07 NOTE — IVS NOTE
DISCHARGE NOTE      Pt is able to sit up and ambulate without difficulty.   Pt voided and tolerated fluids and food.   Procedural site remains dry and intact with good circulation, motion, and sensation.   No signs and symptoms of bleeding/hematoma noted.   Instruction provided, patient/family verbalizes understanding.   Dr. Bob spoke with patient/family post procedure.      Follow up Appointment: 4/18 at 240pm with Dr. Bob  Seen by Dr. Hernandez. Office will call for follow up and testings before surgery.    Discharge to home via wheelchair.

## 2025-04-07 NOTE — CONSULTS
[facility]  part of [facility]  [facility]  Report of Consultation    Tarik Turpin Patient Status:  Outpatient    1950 MRN [id number]   Location [facility] INTERVENTIONAL SUITES Attending No att. providers found   Hosp Day # 0 PCP Logan Hernandez MD     Date of Consult:  2025    Reason for Consultation:  Multivessel coronary artery disease.    History of Present Illness:  Tarik Turpin is a a(n) 74 year old male.  History of permanent pacemaker placed last year at [facility] due to complete heart block which precipitated by episodes of dizziness lightheadedness and blacking out.  Since his pacemaker he has done well but continues to endorse fatigue and shortness of breath and occasional dizziness and lightheadedness.  This ultimately prompted stress test which was abnormal and angiogram was recommended and performed today demonstrating a left dominant system with a 50% left main stenosis and diffuse disease in the left anterior descending artery and obtuse marginal arteries.  We are asked to evaluate for surgical vascularization.  Patient seen in Cath Lab holding area this wife at the bedside.    History:  Past Medical History:    Acid reflux    Age-related nuclear cataract of both eyes    Back problem    CAD (coronary artery disease)    COPD (chronic obstructive pulmonary disease) (HCC)    Decreased lung capacity    Deviated septum    Esophageal reflux    Floaters, right    Fractured nasal bones    Hearing difficulty    High blood pressure    High cholesterol    Macular degeneration    POAG (primary open-angle glaucoma)    Started Latanoprost in both eyes at bedtime- RJM    Shortness of breath    with exertion    Tubular adenoma of colon    polyps removed; if interested in continuing screening, next in     Visual impairment    Glasses     Past Surgical History:   Procedure Laterality Date    Angiogram      Was told he had a blockage      Cardiac pacemaker placement      [employer] per patient    Colonoscopy      [facility] about 5 yrs ago    Colonoscopy N/A 08/20/2019    Procedure: COLONOSCOPY;  Surgeon: IRINEO Villasenor MD;  Location: [facility] ENDOSCOPY    Colonoscopy N/A 4/23/2024    Procedure: COLONOSCOPY;  Surgeon: IRINEO Villasenor MD;  Location: [facility] ENDOSCOPY    Egd      Other surgical history      nasal surgery     Family History   Problem Relation Age of Onset    Diabetes Father     Cancer Sister         stomach cancer    No Known Problems Sister     Cancer Brother     Glaucoma Neg     Macular degeneration Neg       reports that he has never smoked. He has never used smokeless tobacco. He reports that he does not drink alcohol and does not use drugs.    Allergies:  Allergies[1]    Medications:    Current Facility-Administered Medications:     chlorhexidine (Hibiclens) 4 % external liquid, , Topical, On Call    aspirin chewable tab 324 mg, 324 mg, Oral, Once    Home medications are aspirin lisinopril statin Zetia montelukast and a seizure medicine for what sounds like trigeminal neuralgia although he does not under the name.    Review of Systems:  Pertinent items are noted in HPI.    Physical Exam:  Blood pressure 133/76, pulse 67, temperature 97.6 °F (36.4 °C), temperature source Oral, resp. rate 19, height 5' 9\" (1.753 m), weight 159 lb 9.6 oz (72.4 kg), SpO2 100%.    GENERAL: No acute distress.  VITAL SIGNS: See above.  HEENT: Trachea midline.  No jugular venous distention.  No carotid bruit.  CHEST: Chest wall is nontender.  HEART: Regular rate and rhythm without murmurs.  LUNGS: Clear to auscultation bilaterally.  ABDOMEN: Soft, nontender nondistended.  VASCULAR: Palpable radial artery pulses 2+ bilateral.  SKIN: No rash, no excessive bruising, petechiae, or purpura.  NEUROLOGIC: Cranial nerves II-XII intact without motor/sensory deficit.      Laboratory Data:  White blood cell count 12.1 hematocrit 46.9 platelet count 1 50,000  creatinine 0.88    Findings:  Coronaries:  Left main coronary artery: Large size vessel with 40 to 50% stenosis of the mid and distal segment.  Left anterior descending artery: Medium size, Type IV vessel with 80% stenosis proximal segment, 60-70% stenosis mid segment.  Circumflex artery: Large size dominant vessel with 40% mid circumflex, 60% proximal OM1, 70% distal circumflex lesion into LPDA.  Right coronary artery: Small size nondominant vessel with no angiographically significant disease.      RIGHT HEART CATHETERIZATION HEMODYNAMICS:  Right Atrial Pressure: 4/3/2 mmHg  Right Ventricular Pressure: 28/6/5 mmHg  Pulmonary Artery Pressure: 27/9/17  mmHg  Wedge Pressure: 10/9/8 mmHg  Rekha CO: 4.2 L/min; Rekha CI: 2.2 L/min/m²  Transpulmonary Gradient (PAmean - Wedge mean): 9 mmHg  Pulmonary Vascular Resistance (PA-wedge/CO): 2.2 Wood units  Ao saturation 96%  PA saturation 65%  Hgb 13.6  Heart Rate 65    Echocardiogram from 10/29/2024 within ejection fraction 45 to 50% left ventricle with areas of hypokinesis and akinesis in the septal apex and posterior lateral walls.  Normal right ventricular function.  Trace mitral digitation, trace aortic insufficiency      Impression and Plan:  Patient Active Problem List   Diagnosis    Numbness and tingling of foot    Centrilobular emphysema (HCC)    Gastroesophageal reflux disease without esophagitis    Macular degeneration of both eyes    Deviated nasal septum    Coronary artery disease involving native coronary artery of native heart without angina pectoris    Benign prostatic hyperplasia with nocturia    Hypercholesterolemia    Sensorineural hearing loss, bilateral    Glaucoma suspect of both eyes    Age-related nuclear cataract of both eyes    Floaters, right    Polyp of colon    POAG (primary open-angle glaucoma)    Thyroiditis    Odynophagia    Lymphadenopathy    Eagle's syndrome    Third degree heart block (HCC)    Status post placement of cardiac pacemaker     Screening for colon cancer    Diverticula of colon       74-year-old male with history of pacemaker, abnormal stress test and multivessel coronary artery disease.    Patient will benefit from surgical revascularization.  I had a long discussion with the patient, wife.  Using pictures, I explained the nature his heart disease.  I explained his current disease.  I explained treatement options of medical management, high risk PCI and surgical revascularization.  I explained the operation, median sternotomy, use of cardiopulmonary bypass machine, and cardiac arrest.  I explained the bypass operation, conduit selection, long term patency rates of mammary artery and reverse saphenous vein.  We discussed benefits. We discussed risks including but not limited to: bleeding, coagulopathy, transfusion (to which he agrees to accept after discussing risks of transfusion), infection, sternal dehiscence, prolonged ventilation, myocardial infarction, stroke, arrhythmia, atrial fibrillation, kidney injury, dialysis, multisystem organ dysfunction, imponderables and death.      Patient voiced understanding wish to proceed.  We have tentatively scheduled surgery for Wednesday, April 23, 2025 at 7 AM.  Will need carotid duplex to complete his preoperative workup.  Many of his and his wife's excellent questions are answered to their satisfaction.    Eulalio Hernandez MD  Cardiothoracic Surgery  [facility]     Time spent on counseling/coordination of care:  49005- 80 min    Total time spent with patient:  1 Hour    Eulalio Hernandez MD  4/7/2025  3:51 PM       [1] No Known Allergies

## 2025-04-21 NOTE — PAT NURSING NOTE
OPEN HEART SURGERY PRE-OPERATIVE PREPARATION  Shower with BETACEPT or Dial Gold antibacterial soap every day for 3 days prior to surgery, including the day of surgery. No powder, lotion, deodorant, or nail polish, or nail polish and do not apply makeup as well on the day of the surgery. On the morning of surgery, remove ALL jewelry and leave all valuables at home. Do not shave chest area.  Nothing to eat or drink after midnight the day before your surgery.  NO ASPIRIN, PLAVIX, OR BLOOD THINNERS.  Please take the following medications with only sips of water the morning of surgery:   NONE    Please STOP taking the following medications:   STOP ALL VITAMINS/SUPPLEMENTS/NSAIDS 1 WEEK PRIOR TO SURGERY    Calcium Carbonate-Vitamin D (CALTRATE 600+D OR) - last dose 4/16/25     Probiotic Product - last dose 4/16/25      lisinopril 10 MG - last dose 4/20/25     atorvastatin 20 MG - last dose 4/22/25     ezetimibe 10 MG - last dose 4/22/25     montelukast 10 MG - last dose 4/22/25     aspirin 81 MG - last dose 4/22/25      Probiotic Product - last dose 4/16/25      Please arrive at the hospital at 5:30 AM as your surgery is scheduled for 7:00 AM. Park in the BLUE or GREEN parking lot at the [facility]. Complimentary  parking is available at the main entrance Monday - Friday between 7:30am - 4pm. Enter the hospital at the MAIN Entrance and use the elevators within the main entrance lobby to get to the second floor. Check in at the Surgery Reception Desk, which will be on your left when you exit the elevator.  You will be taken to the surgery suite at about 6:45 AM. Once you are taken to the surgery suite, your family will be directed to the critical care lounge area in the Critical Care Unit on the second floor. They are also welcome to visit the retail and lounge spaces on the first floor and the [facility] on the first and second floor near the Critical Care Unit.  Your surgery will last  approximately 4-6 hours. Your family will be notified about 30 minutes prior to completion. Your cardiovascular surgeon will meet with your family once you are transferred out of the surgery suite into a private room where you will recover for the remainder of your hospital visit, which can be expected to last about four to five days.  If you have any special needs, concerns or questions, feel free to call us at [phone] or call Dr. Hernandez's office at [phone].  Warmest Regards.  Sophia Dorado, RN, BSN, BS  CV Pre-admission Testing (PAT) RN, Interventional Suites  [address] [address]  P [phone] F [phone]  [email or web address]

## 2025-04-21 NOTE — PAT NURSING NOTE
Patient is having preadmission teaching for CORONARY ARTERY BYPASS GRAFT; POSSIBLE ENDOSCOPIC VEIN HARVEST  with Dr. Hernandez on date:    4/23/25  .  Pre-op testing performed today with patient. Written and verbal information provided re: samira operative expectations with all questions answered. Patient and wife, Rodolfo , who came with patient, verbalized understanding. Consents obtained.    Instructions reviewed regarding medications to hold, Betasept showers, NPO after MN before surgery, and time to arrive _5:30 AM_ on __4/23/25_, at Surgery Check-in.      Patient and wife verbalized understanding and are both in agreement with treatment plan.    Completed tests reviewed OR PAT RN, and with LAKISHA Person, as well as pending test results for MRSA culture (collected 4/21/25).    Patient reached 2500 ml with IS teaching/Jairon Hall, RT

## 2025-04-23 NOTE — OPERATIVE REPORT
Operative Note    Patient Name: Carolina Montanez    Preoperative Diagnosis: Coronary artery disease involving native coronary artery of native heart with unstable angina pectoris (HCC) [I25.110]    Exertional dyspnea  Abnormal stress test  Multivessel coronary artery disease  History of pacemaker placement    Postoperative Diagnosis: Coronary artery disease involving native coronary artery of native heart with unstable angina pectoris (HCC) [I25.110]    Exertional dyspnea  Abnormal stress test  Multivessel coronary artery disease  History of pacemaker placement    Surgeons and Role:     * Eulalio Hernandez MD - Primary     Assistant: PA: Nayeli Woods PA    No otherwise qualified assistant was available to assist with vein harvesting, vein preparation, assisting, opening and closing of the operative sites.      Procedures:   Median sternotomy  Coronary artery bypass grafting x 3  Left internal mammary artery to left anterior descending artery  Reverse saphenous vein graft to posterior lateral obtuse marginal artery  Reverse saphenous vein graft to posterior descending artery  Endoscopic vein harvesting, greater saphenous vein, left lower extremity  Complex sternal closure with sternal plating system, sternal lock blue    Indication:  74-year-old male status post pacemaker for syncopal events and likely heart block history.  Afterward noticed increasing shortness of breath and dyspnea on exertion.  Stress test was abnormal and angiogram demonstrated multivessel coronary artery disease, I discussed the patient alternatives, benefits and risks of surgery, he consents for surgery today.    Surgical Findings:   Left anterior descending artery 2 mm vessel, good target  Posterolateral obtuse marginal artery 1.75 mm vessel, good target  Posterior descending artery 2 mm vessel, good target  Vein quality is good, 4 mm diameter vein, good quality  Mammary quality is fair, thin-walled, small, about 1.25 mm diameter vessel with  excellent flow  Completion cardiogram ejection fraction is around 50% with no wall motion abnormalities.    Description of procedure:  Patient was taken to the operating room.  Anesthesia was induced.  Central line, swan, radial arterial line, lópez and transesophageal echo were placed.  Patient was then positioned, prepped, draped in the usual sterile fashion.  Arms were tucked at sides, pressure points were padded and wrist was in a neutral, thumbs up position.  Timeout then performed verifying patient, procedure, beta-blocker, antibiotic.  Skin incision made and midline sternotomy performed.  The left internal mammary artery was harvested in a pedicled fashion from the underside of the left endothoracic fascia.  Simultaneously, the greater saphenous vein was harvested in an endoscopic fashion from the left lower extremity.  Systemic heparin was given.  The distal branches of the mammary artery beyond its bifurcation were clipped and the mammary artery transected.  There was excellent pulsatile flow.  The distal ends were clipped, the pedicle was wrapped in papaverine gauze and placed in the left chest.  Sternal retractor was then placed.  Pericardium entered and pericardial well created.  The greater saphenous vein was then prepared for bypass by tying the branches and reinforcing with clips.  Patient was cannulated in the distal ascending aorta and right atrial appendage with a three-stage venous cannula.  After confirming ACT over 480, cardiopulmonary bypass commenced.  Distal anastomotic sites were inspected for feasibility.  A antegrade needle was placed in the proximal ascending aorta.  Cross-clamp was then applied, patient was arrested with 1.6 L of antegrade cold blood cardioplegia solution.  There was excellent arrest after 400 mL.  Antegrade cardioplegia was given at regular 15-20 minute intervals thruout the case.  Cold water and slush was applied to the heart and patient was cooled to 32 °C.  A notch  was made in the pericardium for the mammary pedicle later in the operative case.  Phrenic nerve was identified and spared.    Heart is rotated to expose the posterior descending artery.  An arteriotomy was made in the 2 mm vessel and a 1 mm probe passed freely proximal and distal.  End to side anastomosis then made with reverse saphenous vein and a 7-0 Prolene.  Prior to tying down, a 1 mm probe passed freely proximal and distal in the target vessel, after tying down there is excellent antegrade and retrograde flow upon testing.  The vein graft is then sized to the ascending aorta, transected and spatulated and anastomosed to the aorta and inside fashion with a 6-0 Prolene.  This process then repeated in the same exact fashion the vein graft bypass to the obtuse marginal artery.  Heart rotated expose left anterior descending artery, arteriotomy was made in a 2 mm vessel and a 1 mm probe passes freely proximal and distal.  The distal end of the mammary pedicle was then partially skeletonized, transected and spatulated.  There is excellent pulsatile flow.  The bulldog is applied to the proximal pedicle.  Anastomosis then made end-to-side fashion between the left intramammary artery and the left anterior sending artery with an 8-0 Prolene.  Prior to tying down, a 1 mm probe passed freely proximal and distal in the target vessel, after tying down, the bulldog is removed.  The anastomosis hemostatic and Doppler examination confirms antegrade and retrograde flow in the left anterior descending artery.  The pedicle is secured to the heart with 6-0 Prolene x 2.  Right ventricular pacing wires placed and externalized.  Cross-clamp is removed after hotshot cardioplegia given antegrade.  Patient regained his paced rhythm at 80.  After fully rewarming, patient is slowly weaned off of cardiopulmonary bypass without event.  Patient is decannulated cannulae sites are oversewn.  Protamine is given.  There is still coagulopathy  after the protamine is given therefore platelets are given with improvement the coagulopathy, he did have low platelet count preoperatively in the low 100s.  Attention is now turned to closure.  Pericardium was reapproximated over the mammary pedicle and aorta.  Chest tubes are placed, 32 Botswanan angled in the left lateral space and a 32 B[place]an straight mediastinal space the tip in the right pleural space.  The sternum is then reapproximated 6 single stainless steel wires and the closure was reinforced with sternal plates and sternal lock blue plating system.  Wounds were all then copiously irrigated, they are closed multiple layers and sterile dressings are applied.  We then performed bleeding trials which the patient passed without issue.  He is then taken to the ICU in critical condition on low-dose Levophed infusions and dobutamine infusions and in paced rhythm.  Sponge, needle and instrument counts were all correct at the end of the case.      Anesthesia: Cardiac    Complications: None    Implants: Sternal plating system, sternal lock blue    Specimen: None    Drains: 32 Botswanan chest tubes x 2    Condition: Critical to CVICU    Estimated Blood Loss: 300 mL    Eulalio Hernandez MD

## 2025-04-23 NOTE — ANESTHESIA PROCEDURE NOTES
Central Line    Date/Time: 4/23/2025 7:20 AM    Performed by: Tera Sofia MD  Authorized by: Tera Sofia MD    General Information and Staff    Procedure Start:  4/23/2025 7:20 AM  Procedure End:  4/23/2025 7:35 AM  Anesthesiologist:  Tera Sofia MD  Performed by:  Anesthesiologist  Patient Location:  OR  Indication: central venous access and CVP monitoring    Site Identification: real time ultrasound guided, surface landmarks and image stored and retrievable    Preanesthetic Checklist: 2 patient identifiers, IV checked, risks and benefits discussed, monitors and equipment checked, pre-op evaluation, timeout performed, anesthesia consent and sterile technique used    Procedure Detail    Patient Position:  Trendelenburg  Laterality:  Right  Site:  Internal jugular  Prep:  Chloraprep  Catheter Size:  9 Fr  Catheter Length (cm):  20  Catheter Type:  Introducer  Number of Lumens:  Triple lumen  Oximetric Catheter?: Yes    Procedure Detail: target vein identified, needle advanced into vein and blood aspirated and guidewire advanced into vein    Seldinger Technique?: Yes    Intravenous Verification: verified by ultrasound and venous blood return    Post Insertion: all ports aspirated, all ports flushed easily, guidewire was removed intact, line was sutured in place and dressing was applied      Assessment    Events: patient tolerated procedure well with no complications      PA Catheter Placement    PA Catheter Placed?: Yes    PA Catheter Type:  Oximetric  PA Catheter Size:  8  Laterality:  Right  Site:  Internal jugular  Placement Confirmation: pressure tracing changes and verified by EMANUEL    PA Catheter Depth (cm):  48  Events: patient tolerated procedure well with no complications      Additional Comments

## 2025-04-23 NOTE — DIETARY NOTE
Deferred Cardiac Education Note    Consult received for diet education per order set. Education deferred until pt transferred out of CCU or until s/p intervention and when appropriate for teaching.        Daiana Hardy MS, RDN, LDN  Clinical Dietitian

## 2025-04-23 NOTE — INTERVAL H&P NOTE
Pre-op Diagnosis: Coronary artery disease involving native coronary artery of native heart with unstable angina pectoris (HCC) [I25.110]    The above referenced H&P was reviewed by Eulalio Hernandez MD on 4/23/2025, the patient was examined and no significant changes have occurred in the patient's condition since the H&P was performed.  I discussed with the patient and/or legal representative the potential benefits, risks and side effects of this procedure; the likelihood of the patient achieving goals; and potential problems that might occur during recuperation.  I discussed reasonable alternatives to the procedure, including risks, benefits and side effects related to the alternatives and risks related to not receiving this procedure.  We will proceed with procedure as planned.

## 2025-04-23 NOTE — CONSULTS
[facility]  part of [facility]    Consult Note    Date:  4/23/2025  Date of Admission:  4/23/2025    Chief Complaint:   Carolina Montanez is a(n) 74 year old male with coronary artery bypass grafting.  The patient has a history of bradycardia    HPI:   With heart block necessitating pacemaker insertion.  He had undergone an exertional stress test and was found to have an apical defect.  He underwent coronary CTA which suggested severe proximal LAD and dominant circumflex disease.  He underwent urgent pacemaker [facility].  Prior to the pacemaker insertion, he had undergone coronary catheterization which suggested critical left main disease as well as OM and circumflex disease.  Today, the patient underwent coronary artery bypass grafting x 3 vessels.  He did well intraoperatively and returns to the intensive care unit on Precedex and dobutamine.  Remains sedated on ventilator.  History is obtained per discussion with physician staff and chart review.  The patient is a lifetime non-smoker.  He had presented with dyspnea on exertion.    History   Past Medical History[1]  Past Surgical History[2]  Family History[3]  Social History:  Short Social Hx on File[4]  Allergies/Medications:   Allergies: Allergies[5]  Prescriptions Prior to Admission[6]    Review of Systems:   Review of Systems:  Vision normal. Ear nose and throat normal. Bowel normal. Bladder function normal. No depression. No thyroid disease. No lymphatic system concerns.  No rash. Muscles and joints unremarkable. No weight loss no weight gain.    Physical Exam:   Vital Signs:  Blood pressure (!) 147/93, pulse 74, temperature 97 °F (36.1 °C), temperature source Pulmonary Artery, resp. rate 10, height 5' 9\" (1.753 m), weight 156 lb (70.8 kg), SpO2 99%.     Sedate male  HEENT examination is unremarkable with pupils equal round and reactive to light and accommodation.   Neck without adenopathy, thyromegaly, JVD nor bruit.    Lungs diminished to auscultation and percussion.  Cardiac regular rate and rhythm no murmur.   Abdomen nontender, without hepatosplenomegaly and no mass appreciable.   Extremities without clubbing cyanosis nor edema.   Neurologic sedate on ventilator.  Skin without gross abnormality    Results:     Lab Results   Component Value Date    WBC 16.4 04/23/2025    HGB 11.6 04/23/2025    HCT 34.4 04/23/2025    .0 04/23/2025    CREATSERUM 0.97 04/23/2025    BUN 13 04/23/2025     04/23/2025    K 4.0 04/23/2025     04/23/2025    CO2 24.0 04/23/2025     04/23/2025    CA 9.0 04/23/2025    PTT 30.4 04/23/2025    INR 1.45 04/23/2025    MG 2.1 04/23/2025     Chest x-ray-minimal vascular prominence with tubes in good position.    Assessment/Plan:   1.  Status post coronary artery bypass grafting-postoperative day #0, seen immediately postop.  Chest x-ray looks good.  On Precedex and dobutamine.    Recommendations:  1.  Weaning protocol and hopefully extubate this evening  2.  Aggressive antipain, antithrombosis and antiatelectasis measures.  3.  As per CV surgery and cardiology  4.  Will follow clinically.  5.  Clopidogrel metoprolol and taper Cleviprex    2.  DVT prophylaxis-Lovenox    I am delighted to assist with this patient's care.      Nakul Cao MD  Medical Director, Critical Care, [facility]  Medical Director, [facility]  Pager: [phone]             [1]   Past Medical History:   Acid reflux    Age-related nuclear cataract of both eyes    Back problem    CAD (coronary artery disease)    COPD (chronic obstructive pulmonary disease) (HCC)    Coronary atherosclerosis    Decreased lung capacity    Deviated septum    Esophageal reflux    Floaters, right    Fractured nasal bones    Hearing difficulty    High blood pressure    High cholesterol    Macular degeneration    POAG (primary open-angle glaucoma)    Started Latanoprost in both eyes at bedtime- RJM    Shortness of breath     with exertion    Tubular adenoma of colon    polyps removed; if interested in continuing screening, next in 2029    Visual impairment    Glasses   [2]   Past Surgical History:  Procedure Laterality Date    Angiogram      Was told he had a blockage     Cardiac pacemaker placement      [employer] per patient    Colonoscopy      [facility] about 5 yrs ago    Colonoscopy N/A 08/20/2019    Procedure: COLONOSCOPY;  Surgeon: IRINEO Villasenor MD;  Location: [facility] ENDOSCOPY    Colonoscopy N/A 4/23/2024    Procedure: COLONOSCOPY;  Surgeon: IRINEO Villasenor MD;  Location: [facility] ENDOSCOPY    Egd      Other surgical history      nasal surgery   [3]   Family History  Problem Relation Age of Onset    Diabetes Father     Cancer Sister         stomach cancer    No Known Problems Sister     Cancer Brother     Glaucoma Neg     Macular degeneration Neg    [4]   Social History  Socioeconomic History    Marital status:    Tobacco Use    Smoking status: Never    Smokeless tobacco: Never   Vaping Use    Vaping status: Never Used   Substance and Sexual Activity    Alcohol use: Never    Drug use: Never    Sexual activity: Not Currently   Other Topics Concern    Caffeine Concern No    Exercise No   Social History Narrative    The patient does not use an assistive device..      The patient does live in a home with stairs.     Social Drivers of Health     Food Insecurity: Not At Risk (11/25/2023)    Received from [employer]    Food Insecurity     RETIRE How often in the past 12 months would you say you are worried or stressed about having enough money to buy nutritious meals? : Never   Transportation Needs: No Transportation Needs (11/25/2023)    Received from [employer]    PRAPARE - Transportation     In the past 12 months, has lack of transportation kept you from medical appointments or from getting medications?: No     In the past 12 months, has lack of transportation kept you from meetings, work, or from  getting things needed for daily living?: No   [5] No Known Allergies  [6]   Medications Prior to Admission   Medication Sig    montelukast 10 MG Oral Tab Take 1 tablet (10 mg total) by mouth nightly.    aspirin 81 MG Oral Tab EC Take 1 tablet (81 mg total) by mouth at bedtime.    lisinopril 10 MG Oral Tab Take 1 tablet (10 mg total) by mouth at bedtime.    atorvastatin 20 MG Oral Tab Take 1 tablet (20 mg total) by mouth daily. (Patient taking differently: Take 1 tablet (20 mg total) by mouth nightly.)    ezetimibe 10 MG Oral Tab Take 1 tablet (10 mg total) by mouth daily. (Patient taking differently: Take 1 tablet (10 mg total) by mouth nightly.)    Calcium Carbonate-Vitamin D (CALTRATE 600+D OR) Take by mouth nightly. (Patient not taking: Reported on 4/21/2025)    Probiotic Product (PROBIOTIC DAILY OR) Take by mouth at bedtime.

## 2025-04-23 NOTE — PROGRESS NOTES
[facility] - CARDIOLOGY PROGRESS NOTE  Carolina Montanez Patient Status:  Inpatient    1950 MRN [id number]   Location [facility] 2W/SW Attending Eulalio Hernandez MD   Hosp Day # 0 PCP Logan Hernandez MD     Assessment:    1.  CAD.  Status post CABG x 3 2025.  LIMA to LAD, SVG to OM, SVG to PDA.  POD #0    --Stable blood pressure and rhythm.  Stable hemodynamics.  -- On low-dose dobutamine, nitroglycerin,    EKG: AV paced  Chest x-ray clear  Labs unremarkable.  Mild LV dysfunction preop.        Plan:    Routine postop care.      Subjective:  No chest pain or shortness of breath.    Objective:  BP (!) 147/93 (BP Location: Right arm)   Pulse 85   Temp 97.1 °F (36.2 °C) (Pulmonary Artery)   Resp 16   Ht 175.3 cm (5' 9\")   Wt 156 lb (70.8 kg)   SpO2 99%   BMI 23.04 kg/m²     Temp (24hrs), Av.4 °F (36.3 °C), Min:97 °F (36.1 °C), Max:97.8 °F (36.6 °C)      Intake/Output:    Intake/Output Summary (Last 24 hours) at 2025 1501  Last data filed at 2025 1500  Gross per 24 hour   Intake 4070 ml   Output 1411 ml   Net 2659 ml       Wt Readings from Last 2 Encounters:   25 156 lb (70.8 kg)   25 159 lb 9.6 oz (72.4 kg)       Physical Exam:    General: Intubated, sedated.  HEENT: No focal deficits.  Pupils equal and reactive.  Neck: No JVD, carotids 2+ no bruits.  PA catheter in right neck.  Cardiac: Regular rate and rhythm, S1, S2 normal, no murmur.  3 component rub heard.  Lungs: Clear anterolaterally without wheezes, rales, rhonchi.  Normal excursions and effort.  Abdomen: Soft, non-tender.   Extremities: Without clubbing, cyanosis or edema.  Peripheral pulses are 2+.  Skin: Warm and dry.     Labs:  Lab Results   Component Value Date    WBC 16.4 2025    HGB 11.6 2025    HCT 34.4 2025    .0 2025     Lab Results   Component Value Date    INR 1.45 (H)  04/23/2025     Lab Results   Component Value Date     04/23/2025    K 4.0 04/23/2025     04/23/2025    CO2 24.0 04/23/2025    BUN 13 04/23/2025    CREATSERUM 0.97 04/23/2025     04/23/2025    MG 2.1 04/23/2025    CA 9.0 04/23/2025        No results found for: \"TROP\", \"CKMB\"     Medications:    Scheduled Medications[1]  Medication Infusions[2]    Perez Aguirre MD  4/23/2025  3:01 PM           [1]    acetaminophen  1,000 mg Intravenous Q8H    [START ON 4/24/2025] sennosides  8.6 mg Oral BID    [START ON 4/24/2025] docusate sodium  100 mg Oral BID    metoclopramide  10 mg Intravenous Q6H    famotidine  20 mg Oral BID    Or    famotidine  20 mg Intravenous BID    [START ON 4/24/2025] metoprolol tartrate  25 mg Oral 2x Daily(Beta Blocker)    mupirocin  1 Application Nasal BID    chlorhexidine gluconate  15 mL Mouth/Throat BID    [START ON 4/24/2025] multivitamin  1 tablet Oral Daily    [START ON 4/24/2025] ascorbic acid  500 mg Oral TID    [START ON 4/24/2025] enoxaparin  40 mg Subcutaneous Daily    [START ON 4/24/2025] clopidogrel  75 mg Oral Daily    [START ON 4/24/2025] aspirin  81 mg Oral Daily    ceFAZolin  2 g Intravenous Q8H   [2]    sodium chloride 100 mL/hr (04/23/25 0701)    norepinephrine      sodium chloride      dexmedetomidine 0.6 mcg/kg/hr (04/23/25 1452)    DOBUTamine 2.5 mcg/kg/min (04/23/25 1427)    nitroGLYCERIN in dextrose 5% Stopped (04/23/25 1438)    norepinephrine Stopped (04/23/25 1452)    clevidipine 3 mg/hr (04/23/25 1500)    dextrose 5%-sodium chloride 0.45% 40 mL/hr (04/23/25 1256)    insulin regular 3 Units/hr (04/23/25 1400)    propofol Stopped (04/23/25 1300)

## 2025-04-23 NOTE — ANESTHESIA POSTPROCEDURE EVALUATION
Patient: Carolina Montanez    Procedure Summary       Date: 04/23/25 Room / Location: [facility] MAIN OR  / [facility] MAIN OR    Anesthesia Start: 0700 Anesthesia Stop:     Procedure: CORONARY ARTERY BYPASS GRAFT X 3; UTILIZING THE LEFT INTERNAL MAMMARY ARTERY TO THE LAD; SVG TO THE OM ; SVG TO THE PDA; LEFT LEG ENDOSCOPIC GREATER SAPHENOUS VEIN GRAFT HARVEST; INTRAOPERATIVE TRANSESOPHAGEAL ECHOCARDIOGRAM; INSERTION OF TEMPORARY VENTRICULAR PACING WIRE; Diagnosis:       Coronary artery disease involving native coronary artery of native heart with unstable angina pectoris (HCC)      (Coronary artery disease involving native coronary artery of native heart with unstable angina pectoris (HCC) [I25.110])    Surgeons: Eulalio Hernandez MD Anesthesiologist: Tera Sofia MD    Anesthesia Type: general ASA Status: 4            Anesthesia Type: No value filed.    Vitals Value Taken Time   /52 04/23/25 12:27   Temp 97.8 °F (36.6 °C) 04/23/25 12:27   Pulse 80 04/23/25 12:27   Resp 14 04/23/25 12:27   SpO2 98 % 04/23/25 12:27       EM AN Post Evaluation:   Patient Participation: complete - patient cannot participate  Level of Consciousness: lethargicYes    Cardiovascular Status: acceptable  Respiratory Status: ETT      Tera Sofia MD  4/23/2025 12:27 PM

## 2025-04-23 NOTE — PLAN OF CARE
Received patient from the OR at 1225. Patient had a CABGx3 with sternal platting and left leg harvest. 2plt given in OR. Patient received 1L of albumin post op. Dobutamine and insulin currently running. Patient has been on and off both cleviprex and nitroglycerin. Vital signs are stable. Extubated at 1810 to 4L nasal cannula.  Problem: Patient Centered Care  Goal: Patient preferences are identified and integrated in the patient's plan of care  Description: Interventions:- What would you like us to know as we care for you? JAMES- Provide timely, complete, and accurate information to patient/family- Incorporate patient and family knowledge, values, beliefs, and cultural backgrounds into the planning and delivery of care- Encourage patient/family to participate in care and decision-making at the level they choose- Honor patient and family perspectives and choices  Outcome: Progressing     Problem: Diabetes/Glucose Control  Goal: Glucose maintained within prescribed range  Description: INTERVENTIONS:- Monitor Blood Glucose as ordered- Assess for signs and symptoms of hyperglycemia and hypoglycemia- Administer ordered medications to maintain glucose within target range- Assess barriers to adequate nutritional intake and initiate nutrition consult as needed- Instruct patient on self management of diabetes  Outcome: Progressing     Problem: Patient/Family Goals  Goal: Patient/Family Long Term Goal  Description: Patient's Long Term Goal: JAMES Interventions:- JAMES- See additional Care Plan goals for specific interventions  Outcome: Progressing  Goal: Patient/Family Short Term Goal  Description: Patient's Short Term Goal: JAMES Interventions: - JAMES- See additional Care Plan goals for specific interventions  Outcome: Progressing     Problem: CARDIOVASCULAR - ADULT  Goal: Maintains optimal cardiac output and hemodynamic stability  Description: INTERVENTIONS:- Monitor vital signs, rhythm, and trends- Monitor for bleeding, hypotension  and signs of decreased cardiac output- Evaluate effectiveness of vasoactive medications to optimize hemodynamic stability- Monitor arterial and/or venous puncture sites for bleeding and/or hematoma- Assess quality of pulses, skin color and temperature- Assess for signs of decreased coronary artery perfusion - ex. Angina- Evaluate fluid balance, assess for edema, trend weights  Outcome: Progressing  Goal: Absence of cardiac arrhythmias or at baseline  Description: INTERVENTIONS:- Continuous cardiac monitoring, monitor vital signs, obtain 12 lead EKG if indicated- Evaluate effectiveness of antiarrhythmic and heart rate control medications as ordered- Initiate emergency measures for life threatening arrhythmias- Monitor electrolytes and administer replacement therapy as ordered  Outcome: Progressing     Problem: RESPIRATORY - ADULT  Goal: Achieves optimal ventilation and oxygenation  Description: INTERVENTIONS:- Assess for changes in respiratory status- Assess for changes in mentation and behavior- Position to facilitate oxygenation and minimize respiratory effort- Oxygen supplementation based on oxygen saturation or ABGs- Provide Smoking Cessation handout, if applicable- Encourage broncho-pulmonary hygiene including cough, deep breathe, Incentive Spirometry- Assess the need for suctioning and perform as needed- Assess and instruct to report SOB or any respiratory difficulty- Respiratory Therapy support as indicated- Manage/alleviate anxiety- Monitor for signs/symptoms of CO2 retention  Outcome: Progressing     Problem: Integumentary status not within defined limits  Goal: Pt's integumentary status will be adequate for discharge  Outcome: Progressing     Problem: PAIN - ADULT  Goal: Verbalizes/displays adequate comfort level or patient's stated pain goal  Description: INTERVENTIONS:- Encourage pt to monitor pain and request assistance- Assess pain using appropriate pain scale- Administer analgesics based on type and  severity of pain and evaluate response- Implement non-pharmacological measures as appropriate and evaluate response- Consider cultural and social influences on pain and pain management- Manage/alleviate anxiety- Utilize distraction and/or relaxation techniques- Monitor for opioid side effects- Notify MD/LIP if interventions unsuccessful or patient reports new pain- Anticipate increased pain with activity and pre-medicate as appropriate  Outcome: Progressing

## 2025-04-23 NOTE — ANESTHESIA PREPROCEDURE EVALUATION
Anesthesia PreOp Note    HPI:     Carolina Montanez is a 74 year old male who presents for preoperative consultation requested by: Eulalio Hernandez MD    Date of Surgery: 4/23/2025    Procedure(s):  CORONARY ARTERY BYPASS GRAFT; POSSIBLE ENDOSCOPIC VEIN HARVEST  Indication: Coronary artery disease involving native coronary artery of native heart with unstable angina pectoris (HCC) [I25.110]    Relevant Problems   No relevant active problems       NPO:  Last Liquid Consumption Date: 04/22/25  Last Liquid Consumption Time: 2130  Last Solid Consumption Date: 04/22/25  Last Solid Consumption Time: 2130  Last Liquid Consumption Date: 04/22/25          History Review:  Patient Active Problem List    Diagnosis Date Noted    Screening for colon cancer 04/23/2024    Diverticula of colon 04/23/2024    Third degree heart block (HCC) 12/08/2023    Status post placement of cardiac pacemaker 12/08/2023    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    Thyroiditis 08/02/2022    Odynophagia 08/02/2022    Lymphadenopathy 08/02/2022    POAG (primary open-angle glaucoma) 05/25/2021    Polyp of colon     Glaucoma suspect of both eyes 06/24/2019    Age-related nuclear cataract of both eyes 06/24/2019    Floaters, right 06/24/2019    Sensorineural hearing loss, bilateral 06/19/2019    Centrilobular emphysema (HCC) 06/05/2019    Gastroesophageal reflux disease without esophagitis 06/05/2019    Macular degeneration of both eyes 06/05/2019    Deviated nasal septum 06/05/2019    Coronary artery disease involving native coronary artery of native heart without angina pectoris 06/05/2019    Benign prostatic hyperplasia with nocturia 06/05/2019    Hypercholesterolemia 06/05/2019    Numbness and tingling of foot 05/21/2014       Past Medical History[1]    Past Surgical History[2]    Prescriptions Prior to Admission[3]  Current Medications and Prescriptions Ordered in Epic[4]    Allergies[5]    Family History[6]  Social Hx on file[7]    Available pre-op labs  reviewed.  Lab Results   Component Value Date    WBC 5.9 04/21/2025    RBC 4.89 04/21/2025    HGB 14.4 04/21/2025    HCT 45.2 04/21/2025    MCV 92.4 04/21/2025    MCH 29.4 04/21/2025    MCHC 31.9 04/21/2025    RDW 13.5 04/21/2025    .0 (L) 04/21/2025     Lab Results   Component Value Date     04/21/2025    K 4.0 04/21/2025     04/21/2025    CO2 27.0 04/21/2025    BUN 13 04/21/2025    CREATSERUM 1.03 04/21/2025     (H) 04/21/2025    PGLU 99 04/23/2025    CA 9.9 04/21/2025     Lab Results   Component Value Date    INR 1.04 04/21/2025       Vital Signs:  Body mass index is 23.04 kg/m².   height is 1.753 m (5' 9\") and weight is 70.8 kg (156 lb). His oral temperature is 97.7 °F (36.5 °C). His blood pressure is 130/73 and his pulse is 90. His respiration is 20 and oxygen saturation is 97%.   Vitals:    04/17/25 1403 04/23/25 0606   BP:  130/73   Pulse:  90   Resp:  20   Temp:  97.7 °F (36.5 °C)   TempSrc:  Oral   SpO2:  97%   Weight: 71.2 kg (157 lb) 70.8 kg (156 lb)   Height: 1.753 m (5' 9\") 1.753 m (5' 9\")        Anesthesia Evaluation     Patient summary reviewed and Nursing notes reviewed    Airway   Mallampati: II  TM distance: >3 FB  Neck ROM: full  Dental      Pulmonary - normal exam   (+) COPD, shortness of breath  Cardiovascular - normal exam  (+) pacemaker, hypertension, CAD    Neuro/Psych      Comments: Eagle\"s syndrome- painful mouth opening    GI/Hepatic/Renal    (+) GERD    Endo/Other      Comments: Plt-132 noted  Abdominal                  Anesthesia Plan:   ASA:  4  Plan:   General  Monitors and Lines:   A-line, Additonal IV, BIS, Brain oximetry, Central line, CVP, [place] and EMANUEL  Airway:  ETT  Post-op Pain Management: IV analgesics  Plan Comments: Pacemaker is switched into an asynchronous mode by rep., set at 80/min, will be reset back in the ICU  Informed Consent Plan and Risks Discussed With:  Patient  Use of Blood Products Discussed With:  Patient  Blood Product Use  Consented    Discussed plan with:  Surgeon      I have informed Carolina Montanez and/or legal guardian or family member of the nature of the anesthetic plan, benefits, risks including possible dental damage if relevant, major complications, and any alternative forms of anesthetic management.   All of the patient's questions were answered to the best of my ability. The patient desires the anesthetic management as planned.  Tera Sofia MD  4/23/2025 7:09 AM  Present on Admission:  **None**           [1]   Past Medical History:   Acid reflux    Age-related nuclear cataract of both eyes    Back problem    CAD (coronary artery disease)    COPD (chronic obstructive pulmonary disease) (HCC)    Coronary atherosclerosis    Decreased lung capacity    Deviated septum    Esophageal reflux    Floaters, right    Fractured nasal bones    Hearing difficulty    High blood pressure    High cholesterol    Macular degeneration    POAG (primary open-angle glaucoma)    Started Latanoprost in both eyes at bedtime- RJM    Shortness of breath    with exertion    Tubular adenoma of colon    polyps removed; if interested in continuing screening, next in 2029    Visual impairment    Glasses   [2]   Past Surgical History:  Procedure Laterality Date    Angiogram      Was told he had a blockage     Cardiac pacemaker placement      [employer] per patient    Colonoscopy      [facility] about 5 yrs ago    Colonoscopy N/A 08/20/2019    Procedure: COLONOSCOPY;  Surgeon: IRINEO Villasenor MD;  Location: [facility] ENDOSCOPY    Colonoscopy N/A 4/23/2024    Procedure: COLONOSCOPY;  Surgeon: IRINEO Villasenor MD;  Location: [facility] ENDOSCOPY    Egd      Other surgical history      nasal surgery   [3]   Medications Prior to Admission   Medication Sig Dispense Refill Last Dose/Taking    montelukast 10 MG Oral Tab Take 1 tablet (10 mg total) by mouth nightly. 90 tablet 3 4/20/2025    aspirin 81 MG Oral Tab EC Take 1 tablet (81 mg total) by mouth at  bedtime.   4/19/2025    lisinopril 10 MG Oral Tab Take 1 tablet (10 mg total) by mouth at bedtime.   4/20/2025    atorvastatin 20 MG Oral Tab Take 1 tablet (20 mg total) by mouth daily. (Patient taking differently: Take 1 tablet (20 mg total) by mouth nightly.) 90 tablet 3 4/20/2025    ezetimibe 10 MG Oral Tab Take 1 tablet (10 mg total) by mouth daily. (Patient taking differently: Take 1 tablet (10 mg total) by mouth nightly.) 90 tablet 3 4/20/2025    Calcium Carbonate-Vitamin D (CALTRATE 600+D OR) Take by mouth nightly. (Patient not taking: Reported on 4/21/2025)   4/20/2025    Probiotic Product (PROBIOTIC DAILY OR) Take by mouth at bedtime.   4/20/2025   [4]   Current Facility-Administered Medications Ordered in Epic   Medication Dose Route Frequency Provider Last Rate Last Admin    ceFAZolin (Ancef) 2g in 10mL IV syringe premix  2 g Intravenous Once Eulalio Hernandez MD        sodium chloride 0.9% infusion  83 mL/hr Intravenous Continuous Mariely Sims DO 83 mL/hr at 04/23/25 0620 83 mL/hr at 04/23/25 0620    norepinephrine (Levophed) 4 mg/250mL infusion premix  0.5-30 mcg/min Intravenous Continuous Mariely Sims DO        dexmedeTOMIDine in sodium chloride 0.9% (Precedex) 400 mcg/100mL infusion premix  0.3-0.7 mcg/kg/hr Intravenous Continuous Mariely Sims DO        insulin regular human (Novolin R, Humulin R) 100 Units in sodium chloride 0.9% 100 mL standard infusion (100 mL)  1-40 Units/hr Intravenous Continuous Mariely Sims DO        [Transfer Hold] aminocaproic acid 5 g BOLUS FROM BAG infusion  5 g Intravenous Once Mariely Sims DO        And    [Transfer Hold] aminocaproic acid (Amicar) 10 g in sodium chloride 0.9% 250 mL infusion  1 g/hr Intravenous Once Mariely Sims DO         No current [facility]-ordered outpatient medications on file.   [5] No Known Allergies  [6]   Family History  Problem Relation Age of Onset    Diabetes Father     Cancer Sister         stomach  cancer    No Known Problems Sister     Cancer Brother     Glaucoma Neg     Macular degeneration Neg    [7]   Social History  Socioeconomic History    Marital status:    Tobacco Use    Smoking status: Never    Smokeless tobacco: Never   Vaping Use    Vaping status: Never Used   Substance and Sexual Activity    Alcohol use: Never    Drug use: Never    Sexual activity: Not Currently   Other Topics Concern    Caffeine Concern No    Exercise No

## 2025-04-23 NOTE — HISTORICAL OFFICE NOTE
CHIEF COMPLAINT    CHIEF COMPLAINT  Reason for Visit/Chief Complaint   consult to discuss [facility]   74-year-old male with history of heart block status post pacemaker placement referred by Dr. Mckenna for evaluation of coronary disease. Patient had experience exertional dyspnea and underwent stress test which suggested possible apical defect. This was followed by a coronary CTA which suggests severe proximal LAD and dominant circumflex disease.Patient underwent urgent pacemaker placement at [facility] In 2023. Prior to PPM placement he underwent urgent TVP placement and coronary catheterization which suggested moderate LAD disease. Patient states that after his pacemaker placement, he has noticed more exertional dyspnea. His cardiac risk factors include hypertension and hyperlipidemia.     PROBLEMS  Reconcile with Patient's ChartPROBLEMS  Problem Effective Dates Date resolved Problem Status   Preop testing, [SNOMED-CT: 822792571] 3/21/2025 - Active   Abnormal computed tomography angiography (CTA), [SNOMED-CT: 678346269] 2/4/2025 - Active   SOB (shortness of breath), [SNOMED-CT: 577253312] 9/30/2024 - Active   AV block, 3rd degree, [SNOMED-CT: 14034840] 9/30/2024 - Active   Primary hypertension, [SNOMED-CT: 83798005] 10/4/2024 - Active   Hypercholesterolemia, [SNOMED-CT: 77871205] 6/5/2019 - Active   Benign prostatic hyperplasia with nocturia, [SNOMED-CT: 8209503065953] 6/5/2019 - Active   Cardiac pacemaker (s/p PPM), [SNOMED-CT: 451435425] 9/30/2024 - Active     ENCOUNTER    ENCOUNTER  Problem Effective Dates Date resolved Problem Status   Preop testing, [SNOMED-CT: 962006885] 3/21/2025 - Active   Abnormal nuclear stress test, [SNOMED-CT: 285873028] 11/7/2024 - Active   SOB (shortness of breath), [SNOMED-CT: 488926343] 9/30/2024 - Active   AV block, 3rd degree, [SNOMED-CT: 10720518] 9/30/2024 - Active   Primary hypertension, [SNOMED-CT: 88983957] 10/4/2024 - Active   Hypercholesterolemia, [SNOMED-CT: 97470675]  6/5/2019 - Active   Cardiac pacemaker (s/p PPM), [SNOMED-CT: 963795818] 9/30/2024 - Active     VITAL SIGNS    VITAL SIGNS  Date / Time: 3/21/2025   BP Systolic 122 mmHg   BP Diastolic 70 mmHg   Height 69 inches   Weight 163 lbs   Pulse Rate 81 bpm   BSA (Body Surface Area) 1.9 cc/m2   BMI (Body Mass Index) 24.1 cc/m2   Blood Pressure 122 / 70 mmHg     PHYSICAL EXAMINATION    PHYSICAL EXAMINATION  Header Details   Constitutional o2 sat 98%   Vitals Left Arm Sitting  / 70 mmHg, Pulse rate 81 bpm, Regular, Height in 5' 9\", BMI: 24.1, Weight in 163.14 lbs (or) 74 kgs, BSA : 1.9 cc/m²   General Appearance No Acute Distress, Well groomed, Appropriate   Head/Eyes/Ears/Nose/Mouth/Throat Conjunctiva pink, Sclera Clear, Mucous membranes Moist, No pallor, No icterus   Neck Normal carotid pulsations, No carotid bruits, No JVD   Respiratory Unlabored, Lungs clear with normal breath sounds, Equal bilaterally   Cardiovascular Intact distal pulses, Regular rhythm. Normal rate present. Normal and normal S1 and S2   Gastrointestinal Abdomen soft, Non-tender, Normoactive bowel sounds, No organomegaly, No masses, No pulsations   Musculoskeletal Normal spine   Gait Normal gait   Strength and tone Normal muscle strength, Normal muscle tone     ALLERGIES, ADVERSE REACTIONS, ALERTS    No data available    MEDICATIONS ADMINISTERED DURING VISIT    No data available    MEDICATIONS    MEDICATIONS  Medication Start Date Route/Frequency Status   aspirin 81 mg tablet,delayed release, [RxNorm: 311679] 11/7/2024 Take 1 tablet orally once a day. Active   atorvastatin 20 MG Oral Tab, [RxNorm: 643645] 9/20/2024 Take 1 tablet (20 mg total) by mouth daily. Active   ezetimibe 10 MG Oral Tab, [RxNorm: 718188] 9/20/2024 Take 1 tablet (10 mg total) by mouth daily. Active   lisinopriL 10 mg tablet, [RxNorm: 944088] 12/16/2024 Take 1 tablet orally once a day. Active   montelukast 10 MG Oral Tab, [RxNorm: 802963] 9/20/2024 Take 1 tablet (10 mg total) by  mouth nightly. Active     ASSESSMENT    MERCER, abnormal MPI and CCTA  - concern for severe LAD disease by CT  - Mild/Moderate mLAD disease Cath at [facility]  - Cardiac risk factors include hyperlipidemia, hypertension  - recommend LHC, RHCHistory of moderate CAD  - Approximately 50% stenosis of mid LAD by cath 2023  - Currently on statin, Zetia  - Needs surveillance lipids  Recommendations  - LHC/RHC in April  - RTC 1 week after procedure     FAMILY HISTORY    No data available    GENERAL STATUS    No data available    PAST MEDICAL HISTORY    PAST MEDICAL HISTORY  Problem Date diagonsed Date resolved Status   Preop testing, [SNOMED-CT: 079140135] 3/21/2025 - Active   Abnormal computed tomography angiography (CTA), [SNOMED-CT: 516875711] 2/4/2025 - Active   SOB (shortness of breath), [SNOMED-CT: 270986086] 9/30/2024 - Active   AV block, 3rd degree, [SNOMED-CT: 87775118] 9/30/2024 - Active   Primary hypertension, [SNOMED-CT: 18104383] 10/4/2024 - Active   Hypercholesterolemia, [SNOMED-CT: 09276716] 6/5/2019 - Active   Benign prostatic hyperplasia with nocturia, [SNOMED-CT: 3010414117030] 6/5/2019 - Active     HISTORY OF PRESENT ILLNESS    74-year-old male with history of heart block status post pacemaker placement referred by Dr. Mckenna for evaluation of coronary disease. Patient had experience exertional dyspnea and underwent stress test which suggested possible apical defect. This was followed by a coronary CTA which suggests severe proximal LAD and dominant circumflex disease.Patient underwent urgent pacemaker placement at [facility] In 2023. Prior to PPM placement he underwent urgent TVP placement and coronary catheterization which suggested moderate LAD disease. Patient states that after his pacemaker placement, he has noticed more exertional dyspnea. His cardiac risk factors include hypertension and hyperlipidemia.     IMMUNIZATIONS  Reconcile with Patient's ChartNo data available    PLAN OF CARE    PLAN OF  CARE  Planned Care Date   CBC w/ Differential 1/1/1900   BMP (Basic Metabolic Panel) 1/1/1900   Follow up visit - Leandro Bob MD 1/1/1900     PROCEDURES    No data available    RESULTS    RESULTS  Name Result Date Location - Ordered By   POCT CREATININE [LOINC: 50282-0] 1.00 mg/dL 02/03/2025 11:05:00 AM [facility] ([facility])  Address: [address]  [facility]  [address]  tel:   E GFR CR [LOINC: 02919-8] 78 mL/min/1.73m2 02/03/2025 11:05:00 AM [facility] ([facility])  Address: [address]  [facility]  [address]  tel:   CBC w/ Differential [LOINC: 0851104] Pending Schedule next available     BMP (Basic Metabolic Panel) [LOINC: 199889047] Pending Schedule next available     Myocardial Perfusion Imaging 1.Stress EKG is non-diagnostic secondary to paced ventricular rhythm on baseline EKG. 2.Pt first walked to 4:52 on a ramped Chris protocol to a HR of 120 bpm which is 83% of APMHR. Test was terminated d/t upper heart rate limit of pacemaker causing inconsistent heartrate and changed to a non-walking Regadenoson. The regadenoson was injected at 2:43 in recovery. 3.No chest discomfort.4.Occasional PVC's.1.This is probably a normal perfusion study. 2.Small fixed perfusion abnormality of mild intensity in the apical segment. Probably due to apical thinning artifact, perfusion defect worse at rest.3.The left ventricular cavity is noted to be normal on the stress study. The left ventricular ejection fraction was calculated to be 48% and left ventricular global function is normal. 4.This scan is suggestive of low risk for future cardiovascular events. 5.The study quality is average. 10/29/2024 7:30:00 AM Stephane Foster MD   Trans Thoracic Echocardiogram 1.The left ventricle is normal in size and wall thickness is within normal limits. Global left ventricular systolic function is mildly decreased. The left ventricle diastolic function is impaired (Grade I) with  normal left atrial pressure. Wall motion abnormalities are noted. Left ventricular ejection fraction is 45-50%.2.Right ventricular systolic function is normal. Right ventricular diastolic dimension is 2.8 cms. TAPSE = 1.83 cm. 10/29/2024 10:00:00 AM Blessing Mckenna MD     REVIEW OF SYSTEMS    REVIEW OF SYSTEMS  Header Details   Cardiovascular MERCER  No history of Chest pain, Palpitations, Edema   Respiratory SOB     SOCIAL HISTORY    SOCIAL HISTORY  Social History Element Description Effective Dates   Smoking status Never smoked -     FUNCTIONAL STATUS    No data available    MEDICAL EQUIPMENT    No data available    Goals Sections    No data available    REASON FOR REFERRAL    No data available    Health Concerns Section    No data available    COGNITIVE/MENTAL STATUS    No data available    Patient Demographics    Patient Demographics  Patient Address Patient Name Communication   [address] [place] [address] Tarik Turpin [phone] (Mobile)     Patient Demographics  Language Race / Ethnicity Marital Status   English - Spoken (Preferred) Unknown / Not  or       Document Information    Primary Care Provider Other Service Providers Document Coverage Dates   Setu MAURICIO Paulino  NPI: [id number]  [phone] (Work)  [address], [address]  [place] [address]  [place] [address]  Interpreting Physicians  [facility]  [phone] (Work)  [address] [place] [address] Mitarosy MARTÍNEZ Sims  NPI: [id number]  [phone] (Work)  [address], [address]  [place] [address]  [place] [address]  Nurses     Brisa MARTÍNEZ Garcia  NPI: [id number]  [phone] (Work)  [address], [address]  [place] [address]  [place] [address]  Nurses Mar. 21, 2025March 21, 2025      Organization   [facility]  [phone] (Work)  [address], [address]  [place] [address]  [place] [address]      Encounter Providers Encounter Date    Mar. 21, 2025March 21, 2025     Legal Authenticator    Leandro Bob  NPI: [id number]  [phone] (Work)  [address], [address]  [place] [address]  [place] [address]

## 2025-04-23 NOTE — ANESTHESIA PROCEDURE NOTES
Arterial Line    Date/Time: 4/23/2025 7:10 AM    Performed by: Tera Sofia MD  Authorized by: Tera Sofia MD    General Information and Staff    Procedure Start:  4/23/2025 7:10 AM  Procedure End:  4/23/2025 7:15 AM  Anesthesiologist:  Tera Sofia MD  Performed By:  Anesthesiologist  Patient Location:  OR  Indication: continuous blood pressure monitoring and blood sampling needed    Site Identification: surface landmarks    Preanesthetic Checklist: 2 patient identifiers, IV checked, risks and benefits discussed, monitors and equipment checked, pre-op evaluation, timeout performed, anesthesia consent and sterile technique used    Procedure Details    Catheter Size:  20 G  Catheter Type:  Arrow  Seldinger Technique?: Yes    Laterality:  Left  Site:  Radial artery  Site Prep: chlorhexidine    Line Secured:  Tape, Tegaderm and Wrist Brace    Assessment    Events: patient tolerated procedure well with no complications      Medications  4/23/2025 7:10 AM      Additional Comments

## 2025-04-23 NOTE — CONSULTS
Cardiology Consult Note    Carolina Montanez Patient Status:  Inpatient    1950 MRN [id number]   Location [facility] 2W/SW Attending Eulalio Hernandez MD   Hosp Day # 0 PCP Logan Hernandez MD     [facility] [place].  Carolina Montanez is a 74 year old male with a history of bradycardia status post PPM, severe multivessel coronary disease recently diagnosed by coronary catheterization referred to CT surgery for evaluation of CABG.  Patient underwent CABG X3 with LIMA-LAD, VG-OM, VG-PDA later today.  Patient transferred to ICU for postoperative management in stable condition.    Initial sets of vitals are stable, blood pressure and heart rate within normal range.  [place] catheter in place, last reading CVP 13, PAP 32/15, mean PA 15, cardiac output/index 4.2/2.3, he is on low-dose dobutamine  2 mcg/kg/min, IV fluids.  Patient currently intubated.      --------------------------------------------------------------------------------------------------------------------------------  ROS 10 systems reviewed, pertinent findings above.  ROS    History:  Past Medical History[1]  Past Surgical History[2]  Family History[3]   reports that he has never smoked. He has never used smokeless tobacco. He reports that he does not drink alcohol and does not use drugs.    Objective:   Temp: 97 °F (36.1 °C)  Pulse: 80  Resp: 10  BP: 147/93  AO: 122/66  FiO2 (%): 50 %    Intake/Output:     Intake/Output Summary (Last 24 hours) at 2025 1313  Last data filed at 2025 1227  Gross per 24 hour   Intake 3310 ml   Output 850 ml   Net 2460 ml       Physical Exam:     General: Intubated, sedated  HEENT: Normocephalic, anicteric sclera, neck supple.  Neck: No JVD, carotids 2+, no bruits.  Cardiac: Regular rate and rhythm. S1, S2 normal. No murmur, pericardial rub, S3.  Lungs: Clear without wheezes, rales, rhonchi or dullness.  Normal excursions and effort.  Abdomen: Soft, non-tender. BS-present.  Extremities: Without clubbing, cyanosis or edema.   Peripheral pulses are 2+.  Neurologic: Non-focal  Skin: Warm and dry.       Assessment:    Severe multivessel coronary disease  IVUS with severe mid left main disease, angiographically severe stenosis in the proximal and mid LAD.  Status post CABG with LIMA-LAD, VG-PDA, VG-OM 4/23/25  History of bradycardia status post PPM      Plan:  Patient stable post CABG  Currently on aspirin, Plavix, metoprolol  Recommend statin prior to discharge  Wean dobutamine  Line mgt per CT surg  Extubation per Critical care    Thank you for allowing me to take part in the care of Carolina Montanez. Please call with any questions of concerns.    Level of care: C5    Dr. Leandro Bob,   April 23, 2025  1:13 PM           [1]   Past Medical History:   Acid reflux    Age-related nuclear cataract of both eyes    Back problem    CAD (coronary artery disease)    COPD (chronic obstructive pulmonary disease) (HCC)    Coronary atherosclerosis    Decreased lung capacity    Deviated septum    Esophageal reflux    Floaters, right    Fractured nasal bones    Hearing difficulty    High blood pressure    High cholesterol    Macular degeneration    POAG (primary open-angle glaucoma)    Started Latanoprost in both eyes at bedtime- RJM    Shortness of breath    with exertion    Tubular adenoma of colon    polyps removed; if interested in continuing screening, next in 2029    Visual impairment    Glasses   [2]   Past Surgical History:  Procedure Laterality Date    Angiogram      Was told he had a blockage     Cardiac pacemaker placement      [employer] per patient    Colonoscopy      [facility]ocate about 5 yrs ago    Colonoscopy N/A 08/20/2019    Procedure: COLONOSCOPY;  Surgeon: IRINEO Villasenor MD;  Location: [facility] ENDOSCOPY    Colonoscopy N/A 4/23/2024    Procedure: COLONOSCOPY;  Surgeon: IRINEO Villasenor MD;  Location: [facility] ENDOSCOPY    Egd      Other surgical history      nasal surgery   [3]   Family History  Problem Relation Age of  Onset    Diabetes Father     Cancer Sister         stomach cancer    No Known Problems Sister     Cancer Brother     Glaucoma Neg     Macular degeneration Neg

## 2025-04-23 NOTE — CONSULTS
[facility]    PATIENT'S NAME: ZOHAIB ENCARNACION   ATTENDING PHYSICIAN: Eulalio Hernandez MD   CONSULTING PHYSICIAN: Miladys Torres MD   PATIENT ACCOUNT#:   [id number]    LOCATION:  [address] [place] [address]  MEDICAL RECORD #:   [id number]       YOB: 1950  ADMISSION DATE:       04/23/2025      CONSULT DATE:  04/23/2025    REPORT OF CONSULTATION    REASON FOR ADMISSION:  Post coronary artery bypass graft surgery.    HISTORY OF PRESENT ILLNESS:  The patient is a 74-year-old  male with progressive symptoms of dyspnea on exertion.  He had a stress test as an outpatient which showed apical perfusion defect, had a CT coronary angiogram which shows severe disease in the LAD and left circumflex.  Cardiac angiogram showed mid to distal left main severe disease, proximal LAD and left circumflex 40% to 60%.  Carotid ultrasound negative for hemodynamic significant stenosis.  Referred to Cardiovascular Surgery for further management and operation assessment.  Scheduled today for above-mentioned procedure.  Postoperatively transferred to PCCU for further close monitoring.    PAST MEDICAL HISTORY:  Multivessel coronary artery disease, third-degree heart block status post dual-chamber pacemaker, gastroesophageal reflux disease, benign prostatic hypertrophy, hypertension, hyperlipidemia, generalized osteoarthritis, glaucoma, diverticulosis, chronic obstructive pulmonary disease, degenerative joint disease of lumbar spine.    PAST SURGICAL HISTORY:  Nasal surgery.    MEDICATIONS:  Please see medication reconciliation list.    ALLERGIES:  No known drug allergies.    SOCIAL HISTORY:  No tobacco, alcohol, or drug use.    FAMILY HISTORY:  Father had diabetes mellitus type 2.  Sister had gastric cancer.    REVIEW OF SYSTEMS:  Currently sedated with mechanical ventilation support.      PHYSICAL EXAMINATION:    GENERAL:  Currently sedated with mechanical ventilation support.  Ventilator settings, FiO2 at 50%.  Vent  mode, SIMV/VC.  Tidal volume 600.  Respiratory rate 10.  PEEP, CPAP 5.  VITAL SIGNS:  Temperature 97.8, pulse 80, respiratory rate is 14, blood pressure 124/52, pulse ox 98%.  HEENT:  Atraumatic.  Eyes:  Anicteric sclerae.  NECK:  Supple.  Trachea midline.  LUNGS:  Midsternal dressing with chest tube attached to an atrium.  Crackles auscultated on both lung fields.  HEART:  Regular rate and rhythm.  S1, S2 auscultated.  ABDOMEN:  Soft, nondistended.  EXTREMITIES:  Left lower extremity dressing.  No leg edema.  NEUROLOGIC:  Unable to assess.    ASSESSMENT AND PLAN:  Multivessel coronary artery disease status post coronary artery bypass graft surgery x3 utilizing left internal mammary artery to the LAD, saphenous vein graft to obtuse marginal, saphenous vein graft to posterior descending artery, left leg endoscopic greater saphenous vein graft harvest, intraoperative transesophageal echocardiogram, insertion of temporary ventricular pacing wires.  Continue mechanical ventilation support.  Currently on IV nitroglycerin drip.  Continue to monitor hemodynamic status via left radial arterial line.  Insulin drip with close glucose monitoring.  Pulmonary, Intensive Care, and Cardiology consult.  Further recommendations to follow.    Dictated By Miladys Torres MD  d: 04/23/2025 12:42:28  t: 04/23/2025 13:24:12  Job [id number]  FB/    cc: Eulalio Hernandez MD

## 2025-04-23 NOTE — ANESTHESIA PROCEDURE NOTES
Airway  Date/Time: 4/23/2025 7:08 AM  Reason: elective    Airway not difficult    General Information and Staff   Patient location during procedure: OR  Anesthesiologist: Tera Sofia MD  Performed: anesthesiologist   Performed by: Tera Sofia MD  Authorized by: Tera Sofia MD        Indications and Patient Condition  Indications for airway management: anesthesia  Sedation level: deep      Preoxygenated: yes  Mask difficulty assessment: 1 - vent by mask  Planned trial extubation    Final Airway Details    Final airway type: endotracheal airway    Successful airway: ETT  Cuffed: yes   Successful intubation technique: direct laryngoscopy  Facilitating devices/methods: intubating stylet and cricoid pressure  Endotracheal tube insertion site: oral  Blade: Bertha  Blade size: #3  ETT size (mm): 7.5    Cormack-Lehane Classification: grade I - full view of glottis  Placement verified by: capnometry   Cuff volume (mL): 8  Measured from: teeth  ETT to teeth (cm): 21  Number of attempts at approach: 1  Number of other approaches attempted: 0

## 2025-04-23 NOTE — ANESTHESIA PROCEDURE NOTES
Procedure Performed: EMANUEL       Start Time:  4/23/2025 7:45 AM       End Time:   4/23/2025 12:15 PM    Preanesthesia Checklist:  Patient identified, IV assessed, risks and benefits discussed, monitors and equipment assessed, procedure being performed at surgeon's request and anesthesia consent obtained.    General Procedure Information  Diagnostic Indications for Echo:  hemodynamic monitoring  Physician Requesting Echo: Eulalio Hernandez MD  Intubated  Bite block placed  Heart visualized  Probe Insertion:  Easy  Probe Type:  Multiplane  Modalities:  Pulse wave Doppler    Echocardiographic and Doppler Measurements    Ventricles    Right Ventricle:  Cavity size normal.  Hypertrophy not present.  Thrombus not present.  Global function normal.    Left Ventricle:  Cavity size normal.  Hypertrophy present.  Thrombus not present.  Global Function normal.          Valves    Aortic Valve:  Annulus normal.  Stenosis not present.  Regurgitation absent.  Leaflets normal.  Leaflet motions normal.      Mitral Valve:  Annulus normal.  Stenosis not present.  Regurgitation +1.  Leaflets normal.  Leaflet motions normal.      Tricuspid Valve:  Annulus normal.  Stenosis not present.  Leaflets normal.        Aorta    Ascending Aorta:  Size normal.    Descending Aorta:  Size normal.            Septa        Ventricular Septum:  Intra-ventricular septum morphology hypertrophy.          Other Findings  Pericardium:  normal      Anesthesia Information  Performed Personally  Anesthesiologist:  Tera Sofia MD

## 2025-04-24 NOTE — PROGRESS NOTES
Progress Note     Carolina Montanez Patient Status:  Inpatient    1950 MRN [id number]   Location [facility] 2W/SW Attending Eulalio Hernandez MD   Hosp Day # 1 PCP Logan Hernandez MD     Chief Complaint: patient presented with No chief complaint on file.      Subjective:   S: Patient complain of chest pain postprocedure otherwise no breathing issues.  Family is at the bedside.    Review of Systems:   10 point ROS completed and was negative, except for pertinent positive and negatives stated in subjective.    Objective:   Vital signs:  Temp:  [97 °F (36.1 °C)-99.4 °F (37.4 °C)] 98.1 °F (36.7 °C)  Pulse:  [66-95] 74  Resp:  [10-24] 13  BP: ()/(52-95) 101/78  SpO2:  [93 %-100 %] 99 %  AO: ()/(44-81) 128/57  FiO2 (%):  [40 %-50 %] 40 %    Wt Readings from Last 6 Encounters:   25 156 lb (70.8 kg)   25 159 lb 9.6 oz (72.4 kg)   25 159 lb (72.1 kg)   24 161 lb (73 kg)   24 160 lb (72.6 kg)   23 168 lb (76.2 kg)         Physical Exam:    General: No acute distress. Alert , Extubated.        Respiratory: Clear to auscultation bilaterally. No wheezes. No rhonchi.  Cardiovascular: S1, S2. Regular rate and rhythm. No murmurs, rubs or gallops.   Abdomen: Soft, nontender, nondistended.  Positive bowel sounds. No rebound or guarding.  Neurologic: No focal neurological deficits.   Musculoskeletal: Moves all extremities.  Extremities: No edema.    Results:   Diagnostic Data:      Labs:    Labs Last 24 Hours:   BMP     CBC    Other     Na 144 Cl 110 BUN 12 Glu 139   Hb 9.9   PTT 30.4 Procal -   K 4.1 CO2 25.0 Cr 1.04   WBC 11.8 >< .0  INR 1.45 CRP -   Renal Lytes Endo    Hct 29.7   Trop - D dim -   eGFR - Ca 9.3 POC Gluc  131    LFT   pBNP - Lactic -   eGFR AA - PO4 - A1c -   AST - APk - Prot -  LDL -     Mg 2.1 TSH -   ALT - T cameron - Alb -        COVID-19 Lab Results    COVID-19  Lab Results   Component Value Date    COVID19 Detected (A) 2021       Pro-Calcitonin  No  results for input(s): \"PCT\" in the last 168 hours.    Cardiac  No results for input(s): \"TROP\", \"PBNP\" in the last 168 hours.    Creatinine Kinase  No results for input(s): \"CK\" in the last 168 hours.    Inflammatory Markers  No results for input(s): \"CRP\", \"CARY\", \"LDH\", \"DDIMER\" in the last 168 hours.    Imaging: Imaging data reviewed in Epic.    Medications: Scheduled Medications[1]    Assessment & Plan:   ASSESSMENT / PLAN:       Coronary artery disease status post CABG x 3 on April 23  Blood pressure stable  On nitroglycerin and dobutamine  Cardiothoracic surgery and cardiology following  Continue aspirin, Plavix, Lipitor, Metoprolol     Hypertension  Continue metoprolol     Hyperlipidemia  Continue Zetia and statins      Quality:  DVT Prophylaxis: Lovenox  CODE status:  FULL  DISPO: pending clinical improvement.   Estimated date of discharge: To be determined  Discharge is dependent on: Improved clinical status  At this point Patient is expected to be discharge to: Home versus rehab      Plan of care discussed with Patient and RN.     Coordinated care with providers and counseling re: treatment plan and workup     Mady Hamlin MD    Supplementary Documentation:         **Certification      PHYSICIAN Certification of Need for Inpatient Hospitalization - Initial Certification    Patient will require inpatient services that will reasonably be expected to span two midnight's based on the clinical documentation in H+P.   Based on patients current state of illness, I anticipate that, after discharge, patient will require TBD.             I personally reviewed the available laboratories, imaging including operative report. I discussed/will discuss the case with patient and her nurse. I ordered laboratories studies. I adjusted medications including not applicable today. Medical decision making high, risk is high.     >55min spent, >50% spent counseling and coordinating care in the form of educating pt/family and d/w  consultants and staff. Most of the time spent discussing the above plan.                 [1]    sennosides  8.6 mg Oral BID    docusate sodium  100 mg Oral BID    metoclopramide  10 mg Intravenous Q6H    famotidine  20 mg Oral BID    Or    famotidine  20 mg Intravenous BID    metoprolol tartrate  25 mg Oral 2x Daily(Beta Blocker)    mupirocin  1 Application Nasal BID    chlorhexidine gluconate  15 mL Mouth/Throat BID    multivitamin  1 tablet Oral Daily    ascorbic acid  500 mg Oral TID    enoxaparin  40 mg Subcutaneous Daily    clopidogrel  75 mg Oral Daily    aspirin  81 mg Oral Daily    ceFAZolin  2 g Intravenous Q8H

## 2025-04-24 NOTE — PLAN OF CARE
Problem: Patient Centered Care  Goal: Patient preferences are identified and integrated in the patient's plan of care  Description: Interventions:- What would you like us to know as we care for you? Keep the patient involved the the plan of care - Provide timely, complete, and accurate information to patient/family- Incorporate patient and family knowledge, values, beliefs, and cultural backgrounds into the planning and delivery of care- Encourage patient/family to participate in care and decision-making at the level they choose- Honor patient and family perspectives and choices  Outcome: Progressing     Problem: Diabetes/Glucose Control  Goal: Glucose maintained within prescribed range  Description: INTERVENTIONS:- Monitor Blood Glucose as ordered- Assess for signs and symptoms of hyperglycemia and hypoglycemia- Administer ordered medications to maintain glucose within target range- Assess barriers to adequate nutritional intake and initiate nutrition consult as needed- Instruct patient on self management of diabetes  Outcome: Progressing     Problem: Patient/Family Goals  Goal: Patient/Family Long Term Goal  Description: Patient's Long Term Goal: Discharge home    Interventions:- Discharge home  - Medications as ordered  - Tests and procedures as ordered  - PT/OT as ordered  - See additional Care Plan goals for specific interventions  Outcome: Progressing  Goal: Patient/Family Short Term Goal  Description: Patient's Short Term Goal: Increased strength and mobility  Interventions: -   - Medications as ordered  - Tests and procedures as ordered  - PT/OT as ordered  - See additional Care Plan goals for specific interventions  Outcome: Progressing     Problem: CARDIOVASCULAR - ADULT  Goal: Maintains optimal cardiac output and hemodynamic stability  Description: INTERVENTIONS:- Monitor vital signs, rhythm, and trends- Monitor for bleeding, hypotension and signs of decreased cardiac output- Evaluate effectiveness of  vasoactive medications to optimize hemodynamic stability- Monitor arterial and/or venous puncture sites for bleeding and/or hematoma- Assess quality of pulses, skin color and temperature- Assess for signs of decreased coronary artery perfusion - ex. Angina- Evaluate fluid balance, assess for edema, trend weights  Outcome: Progressing  Goal: Absence of cardiac arrhythmias or at baseline  Description: INTERVENTIONS:- Continuous cardiac monitoring, monitor vital signs, obtain 12 lead EKG if indicated- Evaluate effectiveness of antiarrhythmic and heart rate control medications as ordered- Initiate emergency measures for life threatening arrhythmias- Monitor electrolytes and administer replacement therapy as ordered  Outcome: Progressing     Problem: RESPIRATORY - ADULT  Goal: Achieves optimal ventilation and oxygenation  Description: INTERVENTIONS:- Assess for changes in respiratory status- Assess for changes in mentation and behavior- Position to facilitate oxygenation and minimize respiratory effort- Oxygen supplementation based on oxygen saturation or ABGs- Provide Smoking Cessation handout, if applicable- Encourage broncho-pulmonary hygiene including cough, deep breathe, Incentive Spirometry- Assess the need for suctioning and perform as needed- Assess and instruct to report SOB or any respiratory difficulty- Respiratory Therapy support as indicated- Manage/alleviate anxiety- Monitor for signs/symptoms of CO2 retention  Outcome: Progressing     Problem: Integumentary status not within defined limits  Goal: Pt's integumentary status will be adequate for discharge  Outcome: Progressing     Problem: PAIN - ADULT  Goal: Verbalizes/displays adequate comfort level or patient's stated pain goal  Description: INTERVENTIONS:- Encourage pt to monitor pain and request assistance- Assess pain using appropriate pain scale- Administer analgesics based on type and severity of pain and evaluate response- Implement  non-pharmacological measures as appropriate and evaluate response- Consider cultural and social influences on pain and pain management- Manage/alleviate anxiety- Utilize distraction and/or relaxation techniques- Monitor for opioid side effects- Notify MD/LIP if interventions unsuccessful or patient reports new pain- Anticipate increased pain with activity and pre-medicate as appropriate  Outcome: Progressing

## 2025-04-24 NOTE — PLAN OF CARE
Problem: Patient Centered Care  Goal: Patient preferences are identified and integrated in the patient's plan of care  Description: Interventions:- What would you like us to know as we care for you?  I have two daughters- Provide timely, complete, and accurate information to patient/family- Incorporate patient and family knowledge, values, beliefs, and cultural backgrounds into the planning and delivery of care- Encourage patient/family to participate in care and decision-making at the level they choose- Honor patient and family perspectives and choices  Outcome: Progressing     Problem: Diabetes/Glucose Control  Goal: Glucose maintained within prescribed range  Description: INTERVENTIONS:- Monitor Blood Glucose as ordered- Assess for signs and symptoms of hyperglycemia and hypoglycemia- Administer ordered medications to maintain glucose within target range- Assess barriers to adequate nutritional intake and initiate nutrition consult as needed- Instruct patient on self management of diabetes  Outcome: Progressing     Problem: CARDIOVASCULAR - ADULT  Goal: Maintains optimal cardiac output and hemodynamic stability  Description: INTERVENTIONS:- Monitor vital signs, rhythm, and trends- Monitor for bleeding, hypotension and signs of decreased cardiac output- Evaluate effectiveness of vasoactive medications to optimize hemodynamic stability- Monitor arterial and/or venous puncture sites for bleeding and/or hematoma- Assess quality of pulses, skin color and temperature- Assess for signs of decreased coronary artery perfusion - ex. Angina- Evaluate fluid balance, assess for edema, trend weights  Outcome: Progressing  Goal: Absence of cardiac arrhythmias or at baseline  Description: INTERVENTIONS:- Continuous cardiac monitoring, monitor vital signs, obtain 12 lead EKG if indicated- Evaluate effectiveness of antiarrhythmic and heart rate control medications as ordered- Initiate emergency measures for life threatening  arrhythmias- Monitor electrolytes and administer replacement therapy as ordered  Outcome: Progressing     Problem: RESPIRATORY - ADULT  Goal: Achieves optimal ventilation and oxygenation  Description: INTERVENTIONS:- Assess for changes in respiratory status- Assess for changes in mentation and behavior- Position to facilitate oxygenation and minimize respiratory effort- Oxygen supplementation based on oxygen saturation or ABGs- Provide Smoking Cessation handout, if applicable- Encourage broncho-pulmonary hygiene including cough, deep breathe, Incentive Spirometry- Assess the need for suctioning and perform as needed- Assess and instruct to report SOB or any respiratory difficulty- Respiratory Therapy support as indicated- Manage/alleviate anxiety- Monitor for signs/symptoms of CO2 retention  Outcome: Progressing     Problem: PAIN - ADULT  Goal: Verbalizes/displays adequate comfort level or patient's stated pain goal  Description: INTERVENTIONS:- Encourage pt to monitor pain and request assistance- Assess pain using appropriate pain scale- Administer analgesics based on type and severity of pain and evaluate response- Implement non-pharmacological measures as appropriate and evaluate response- Consider cultural and social influences on pain and pain management- Manage/alleviate anxiety- Utilize distraction and/or relaxation techniques- Monitor for opioid side effects- Notify MD/LIP if interventions unsuccessful or patient reports new pain- Anticipate increased pain with activity and pre-medicate as appropriate  Outcome: Progressing

## 2025-04-24 NOTE — SLP NOTE
ADULT SWALLOWING EVALUATION    ASSESSMENT    ASSESSMENT/OVERALL IMPRESSION:  PPE REQUIRED. THIS THERAPIST WORE GLOVES FOR DURATION OF EVALUATION. HANDS WASHED UPON ENTRANCE/EXIT.    Per MD H&P, \"The patient is a 74-year-old  male with progressive symptoms of dyspnea on exertion. He had a stress test as an outpatient which showed apical perfusion defect, had a CT coronary angiogram which shows severe disease in the LAD and left circumflex. Cardiac angiogram showed mid to distal left main severe disease, proximal LAD and left circumflex 40% to 60%. Carotid ultrasound negative for hemodynamic significant stenosis. Referred to Cardiovascular Surgery for further management and operation assessment. Scheduled today for above-mentioned procedure. Postoperatively transferred to PCCU for further close monitoring.\"    SLP BSSE orders received and acknowledged. A swallow evaluation warranted per \"eval/tx, high risk for aspiration\". Pt afebrile with weak/hoarse vocal quality, on room air, with oxygen saturation at 99%. Pt with no hx of dysphagia at [facility].   Pt positioned upright in bedside chair. Pt with no complaints of pain. Oral motor skills within functional limits. Pt presented with trials of hard solids, puree and thin liquids via straw. Pt with adequate oral acceptance and bilabial seal across all trials. Pt with intact bite, mastication of solids, and timely A-P transit. Pt's swallow response appears timely with adequate hyolaryngeal elevation/excursion. No clinical signs of aspiration (e.g., immediate/delayed throat clear, immediate/delayed cough, wet vocal quality, increased O2 effort) observed across all trials. 4/24 CXR indicates \"Lines and tubes in place as described. Mild interstitial edema\". Oxygen status remained stable t/o the entire evaluation.     At this time, pt presents with functional oral and probable pharyngeal swallow stages. Recommend a regular diet and thin liquids with strict adherence  to safe swallowing compensatory strategies. Results and recommendations reviewed with RN, pt, and family. Pt/pt's family v/u to all results/recommendations. Recommendations remain written on whiteboard. SLP collaborated with RN for MD diet orders.     PLAN: SLP to f/u x1-2 meal assessments, monitor imaging, and VFSS if clinically indicated       RECOMMENDATIONS   Diet Recommendations - Solids: Regular  Diet Recommendations - Liquids: Thin Liquids                       Aspiration Precautions: Upright position, Slow rate, Small bites, Small sips  Medication Administration Recommendations:  (as tolerated)  Treatment Plan/Recommendations: Aspiration precautions    HISTORY   MEDICAL HISTORY  Reason for Referral:  (eval/tx, high aspiration risk)    Problem List  Active Problems:    Preop testing    CAD (coronary artery disease)      Past Medical History  Past Medical History[1]    Prior Living Situation: Unknown  Diet Prior to Admission: Regular, Thin liquids  Precautions: Aspiration    Patient/Family Goals: did not state    SWALLOWING HISTORY  Current Diet Consistency: NPO  Dysphagia History: none  Imaging Results: see above    SUBJECTIVE       OBJECTIVE   ORAL MOTOR EXAMINATION  Dentition: Functional  Symmetry: Within Functional Limits  Strength: Within Functional Limits     Range of Motion: Within Functional Limits  Rate of Motion: Within Functional Limits    Voice Quality: Weak, Hoarse  Respiratory Status: Unlabored  Consistencies Trialed: Thin liquids, Puree, Hard solid  Method of Presentation: Staff/Clinician assistance, Straw  Patient Positioned: Midline, Reclined    Oral Phase of Swallow: Within Functional Limits                    Pharyngeal Phase of Swallow: Within Functional Limits           (Please note: Silent aspiration cannot be evaluated clinically. Videofluoroscopic Swallow Study is required to rule-out silent aspiration.)    Esophageal Phase of Swallow: No complaints consistent with possible esophageal  involvement  Comments: NA          GOALS  Goal #1 The patient will tolerate regular consistency and thin liquids without overt signs or symptoms of aspiration with 100 % accuracy over 1-2 session(s).  In Progress   Goal #2 The patient/family/caregiver will demonstrate understanding and implementation of aspiration precautions and swallow strategies independently over 1-2 session(s).    In Progress     FOLLOW UP  Treatment Plan/Recommendations: Aspiration precautions  Duration: 1 week  Follow Up Needed (Documentation Required): Yes  SLP Follow-up Date: 04/25/25    Thank you for your referral.   If you have any questions, please contact NAGI Moraes M.S. CCC-SLP  Speech Language Pathologist  Phone Number Ext. [phone]         [1]   Past Medical History:   Acid reflux    Age-related nuclear cataract of both eyes    Back problem    CAD (coronary artery disease)    COPD (chronic obstructive pulmonary disease) (HCC)    Coronary atherosclerosis    Decreased lung capacity    Deviated septum    Esophageal reflux    Floaters, right    Fractured nasal bones    Hearing difficulty    High blood pressure    High cholesterol    Macular degeneration    POAG (primary open-angle glaucoma)    Started Latanoprost in both eyes at bedtime- RJM    Shortness of breath    with exertion    Tubular adenoma of colon    polyps removed; if interested in continuing screening, next in 2029    Visual impairment    Glasses

## 2025-04-24 NOTE — PROGRESS NOTES
[facility]  part of [facility]    Progress Note      Assessment and Plan:   1.  Status post coronary artery bypass grafting-postoperative day #1, seen immediately postop.  Chest x-ray looks good.  Was back on low-dose dopamine earlier today.  Tolerated extubation without event.  Overall doing very well.    Recommendations:  1.  Metoprolol, clopidogrel, aspirin, atorvastatin  2.  Aggressive antipain, antithrombosis and antiatelectasis measures.  3.  As per CV surgery and cardiology  4.  Will follow clinically.     2.  DVT prophylaxis-Lovenox        Subjective:   Carolina Montanez is a(n) 74 year old male who is wakeful and out of bed without complaint    Objective:   Blood pressure 107/50, pulse 75, temperature 98.1 °F (36.7 °C), temperature source Pulmonary Artery, resp. rate 16, height 5' 9\" (1.753 m), weight 156 lb (70.8 kg), SpO2 98%.    Physical Exam alert male  HEENT examination is unremarkable with pupils equal round and reactive to light and accommodation.   Neck without adenopathy, thyromegaly, JVD nor bruit.   Lungs slightly diminished to auscultation and percussion.  Cardiac regular rate and rhythm no murmur.   Abdomen nontender, without hepatosplenomegaly and no mass appreciable.   Extremities without clubbing cyanosis nor edema.   Neurologic grossly intact with symmetric tone and strength and reflex.  Skin without gross abnormality     Results:     Lab Results   Component Value Date    WBC 11.8 04/24/2025    HGB 9.9 04/24/2025    HCT 29.7 04/24/2025    .0 04/24/2025    CREATSERUM 1.04 04/24/2025    BUN 12 04/24/2025     04/24/2025    K 4.1 04/24/2025     04/24/2025    CO2 25.0 04/24/2025     04/24/2025    CA 9.3 04/24/2025    PTT 30.4 04/23/2025    INR 1.45 04/23/2025    MG 2.1 04/24/2025       Nakul Cao MD  Medical Director, Critical Care, [facility]  Medical Director, [facility]  Pager: [phone]

## 2025-04-24 NOTE — PROGRESS NOTES
[facility]  part of [facility]    Progress Note    Carolina Montanez Patient Status:  Inpatient    1950 MRN [id number]   Location [facility] 2W/SW Attending Eulalio Hernandez MD   Hosp Day # 1 PCP Logan Hernandez MD     Subjective:  Pt sitting in the chair. He is awake, alert & oriented x 3, calm and appropriate. He has some sternal incision discomfort jil with deep breaths. Feels mildly SOB.   No visitors at bedside.     Objective:  /78 (BP Location: Right arm)   Pulse 74   Temp 98.1 °F (36.7 °C) (Pulmonary Artery)   Resp 13   Ht 5' 9\" (1.753 m)   Wt 156 lb (70.8 kg)   SpO2 99%   BMI 23.04 kg/m²     Temp (24hrs), Av.6 °F (37 °C), Min:97 °F (36.1 °C), Max:99.4 °F (37.4 °C)      Intake/Output:    Intake/Output Summary (Last 24 hours) at 2025 0851  Last data filed at 2025 0700  Gross per 24 hour   Intake 5772.33 ml   Output 3730 ml   Net 2042.33 ml       Wt Readings from Last 3 Encounters:   25 156 lb (70.8 kg)   25 159 lb 9.6 oz (72.4 kg)   25 159 lb (72.1 kg)       Allergies:  Allergies[1]    Labs:  Lab Results   Component Value Date    WBC 11.8 2025    HGB 9.9 2025    HCT 29.7 2025    .0 2025    CREATSERUM 1.04 2025    BUN 12 2025     2025    K 4.1 2025     2025    CO2 25.0 2025     2025    CA 9.3 2025    PTT 30.4 2025    INR 1.45 2025    MG 2.1 2025       Physical Exam:  Blood pressure 101/78, pulse 74, temperature 98.1 °F (36.7 °C), temperature source Pulmonary Artery, resp. rate 13, height 5' 9\" (1.753 m), weight 156 lb (70.8 kg), SpO2 99%.  General: NAD  Neck: No JVD  Lungs: Clear anteriorly/diminished laterally   Chest tubes= 210cc/12 hours- no air leak   Heart: RRR, S1, S2: faint pericardial rub  Abdomen: Soft/Round, NT/ND, BS+x4 diminished/no Flatus/no BM   Extremities: Warm, dry, no LE edema bilat  Pulses: 1+ bilat  DP  Skin: sternotomy incision drsg: CDI w/TPW intact/Left leg ACE wrap intact  Neurological:  AAOx3, MAEW    Assessment/Plan:  S/P CABG x 3 POD # 1  Encourage pulm toilet: IS. Currently on 4 liters N/C:wean as able. CXR reviewed. Will add EZ pap.   Pain meds as needed  Increase activity-OOB to chair and ambulate in hallway later today  Scds/Lovenox prophylaxis DVT prevention   Requiring Dobuta @ 0.5mcg for hemodynamic stability: give one Albumin this AM-wean off Dobuta- once continued stable- remove Stanford/Heber and A-line   Expected post op volume overload: Lasix PRN   Expected post op anemia w/o clinical significance/stable  IV Insulin protocol w/no hx: DM- plan to transition today  Hx: Thyroid nodule which has increased since 2022 as noted on his pre op carotid ultrasound. Pt aware. Recommend further discussion with his PCP during his follow up appt   12-lead EKG reviewed/hx: PPM   Discharge planning: pt lives with his wife. Plan for home when ready.     Plan of care discussed w/patient/RN and CV Surgeon: Dr. Mary Levine, APRN  4/24/2025  8:51 AM       [1] No Known Allergies

## 2025-04-24 NOTE — CARDIAC REHAB
Cardiac Rehab Phase I    Activity:   Chair: Yes   Ambulation: Yes   Assistive Device: Walker   Distance: 200 feet   Assistance needed: Minimal   Patient tolerated activity: Well.   Shower Date:  Tolerated Shower Activity .    Education:  Handouts provided and reviewed: Heart Surgery Binder. :Referred to Cardiac Rehabilitation Phase 2.  Patient instructed on: I.S. / Acapella, Cough and Deep Breathe, and Pain Scale Instruction.     Diet: Healthy Cardiac diet reviewed.    Disease Process: Disease process reviewed.    Weight Monitoring: Instructed on daily weights.    I.S. and/or Acapella: Patient instructed on incentive spirometer and/or acapella with good return demonstration.    Infection: Reviewed signs and symptoms of infection. Infection prevention and when to notify MD.

## 2025-04-24 NOTE — PROGRESS NOTES
[facility]  Cardiology Progress Note    Carolina Montanez Patient Status:  Inpatient    1950 MRN [id number]   Location [facility] 2W/SW Attending Eulalio Hernandez MD   Hosp Day # 1 PCP Logan Hernandez MD     Subjective     Complains primarily of chest discomfort.  No other cardiac complaints.    Objective:   Patient Vitals for the past 24 hrs:   BP Temp Temp src Pulse Resp SpO2   25 0800 101/78 98.1 °F (36.7 °C) Pulmonary Ar 74 13 99 %   25 0700 90/55 98.7 °F (37.1 °C) Pulmonary Ar 76 12 93 %   25 0600 103/65 98.6 °F (37 °C) Pulmonary Ar 78 15 99 %   25 0500 (!) 110/95 98.9 °F (37.2 °C) Pulmonary Ar 88 16 94 %   25 0400 104/62 98.8 °F (37.1 °C) Pulmonary Ar 80 13 100 %   25 0320 104/59 98.8 °F (37.1 °C) Pulmonary Ar 83 14 100 %   25 0300 -- 98.8 °F (37.1 °C) Pulmonary Ar 82 13 100 %   25 0200 114/65 98.8 °F (37.1 °C) Pulmonary Ar 85 15 100 %   25 0100 104/71 98.8 °F (37.1 °C) Pulmonary Ar 93 16 99 %   25 0000 102/69 98.9 °F (37.2 °C) Pulmonary Ar 83 14 99 %   25 2300 110/66 99 °F (37.2 °C) Pulmonary Ar 83 16 99 %   25 2200 111/65 98.9 °F (37.2 °C) Pulmonary Ar 87 17 99 %   25 2100 114/70 99 °F (37.2 °C) Pulmonary Ar 85 15 99 %   25 2000 107/76 99.1 °F (37.3 °C) Pulmonary Ar 88 17 94 %   25 1900 -- -- -- 89 19 99 %   25 1810 -- -- -- 88 18 100 %   25 1800 -- 99.4 °F (37.4 °C) Pulmonary Ar 90 22 97 %   25 1730 -- -- -- 92 24 99 %   25 1700 -- 98.9 °F (37.2 °C) Pulmonary Ar 84 12 99 %   25 1630 -- -- -- 89 15 98 %   25 1600 -- 98.6 °F (37 °C) Pulmonary Ar 89 14 99 %   25 1545 -- -- -- 77 11 99 %   25 1530 -- -- -- 84 11 99 %   25 1515 -- -- -- 89 10 98 %   25 1500 -- 98.2 °F (36.8 °C) Pulmonary Ar 85 16 99 %   25 1445 -- -- -- 66 15 99 %   25 1430 -- -- -- 79 10 98 %   25 1424 -- -- -- -- -- 100 %   25 1415 -- -- -- 80 10  99 %   04/23/25 1400 -- 97.1 °F (36.2 °C) Pulmonary Ar 95 13 99 %   04/23/25 1345 -- -- -- 93 11 99 %   04/23/25 1330 -- -- -- 74 10 99 %   04/23/25 1315 -- -- -- 80 11 99 %   04/23/25 1300 -- -- -- 80 10 98 %   04/23/25 1245 -- -- -- 80 10 99 %   04/23/25 1230 (!) 147/93 97 °F (36.1 °C) Pulmonary Ar 80 10 100 %   04/23/25 1227 124/52 97.8 °F (36.6 °C) -- 80 14 98 %     Intake/Output:   Last 3 shifts:   Intake/Output                   04/22/25 0700 - 04/23/25 0659 (Not Admitted) 04/23/25 0700 - 04/24/25 0659 04/24/25 0700 - 04/25/25 0659       Intake    I.V.  --  3091.2  51.1    Volume (mL) Insulin -- 46 2    Volume (mL) Nitroglycerin -- 31.3 --    Volume (mL) Dobutamine -- 69.8 0    Volume (mL) Dexmedetomidine -- 91.9 --    Volume (mL) Norepinephrine -- 1.3 --    Volume (mL) (sodium chloride 0.9% infusion) -- 167.7 9.8    Volume (mL) (dextrose 5%-sodium chloride 0.45% infusion) -- 683.3 39.3    Volume (mL) (sodium chloride 0.9% infusion) -- 2000 --    Blood  --  1300  --    Cell Saver Blood  -- 600 --    Volume (Transfuse platelets) -- 350 --    Volume (Transfuse platelets) -- 350 --    IV PIGGYBACK  --  1340  --    Volume (mL) (acetaminophen (Ofirmev) 10 mg/mL infusion premix 1,000 mg) -- 200 --    Volume (mL) (potassium chloride 20 mEq/100mL IVPB premix 20 mEq) -- 100 --    Volume (mL) (albumin human (Albumin) 5% injection 12.5 g) -- 1000 --    Volume (mL) (ceFAZolin (Ancef) 2g in 10mL IV syringe premix) -- 30 --    Volume (mL) (ceFAZolin (Ancef) 2g in 10mL IV syringe premix) -- 10 --    Total Intake -- 5731.2 51.1       Output    Urine  --  3325  20    Urine -- 850 --    Output (mL) (Urethral Catheter Latex;Double-lumen;Temperature probe) -- 2475 20    Chest Tube  --  345  40    Output (mL) (Y Chest Tube 1 and 2 Anterior Mediastinal 32 Fr. Anterior Pleural 32 Fr.) -- 345 40    Total Output -- 3670 60       Net I/O     -- 2061.2 -8.9             Vent Settings: Vent Mode: SIMV/VC  FiO2 (%):  [40 %-50 %] 40 %  S  RR:  [10] 10  S VT:  [600 mL] 600 mL  PEEP/CPAP (cm H2O):  [5 cm H20] 5 cm H20  MAP (cm H2O):  [8.4-8.5] 8.4    Hemodynamic parameters (last 24 hours): PAP: (19-39)/(2-16) 24/11  CO:  [3.2 L/min-6 L/min] 4 L/min  CI:  [1.7 L/min/m2-3.2 L/min/m2] 2.1 L/min/m2    Scheduled Meds: Scheduled Medications[1]    Continuous Infusions: Medication Infusions[2]    Results:     Lab Results   Component Value Date    WBC 11.8 (H) 04/24/2025    HGB 9.9 (L) 04/24/2025    HCT 29.7 (L) 04/24/2025    .0 (L) 04/24/2025    CREATSERUM 1.04 04/24/2025    BUN 12 04/24/2025     04/24/2025    K 4.1 04/24/2025     04/24/2025    CO2 25.0 04/24/2025     (H) 04/24/2025    CA 9.3 04/24/2025    ALB 4.4 04/21/2025    ALKPHO 66 04/21/2025    BILT 0.9 04/21/2025    TP 6.8 04/21/2025    AST 27 04/21/2025    ALT 30 04/21/2025    PTT 30.4 04/23/2025    INR 1.45 (H) 04/23/2025    T4F 1.0 08/01/2022    TSH 1.926 11/21/2023    PSA 0.68 06/07/2023    ESRML 18 04/04/2023    CRP 0.31 (H) 04/04/2023    MG 2.1 04/24/2025    B12 1,098 (H) 11/21/2023       Recent Labs   Lab 04/23/25  1157 04/23/25  1609 04/23/25  2333 04/24/25  0538   * 136*  --  139*   BUN 13 13  --  12   CREATSERUM 0.97 1.13  --  1.04   CA 9.0 9.1  --  9.3    144  --  144   K 4.0 3.7 4.2 4.1    110  --  110   CO2 24.0 25.0  --  25.0     Recent Labs   Lab 04/21/25  0743 04/23/25  1157 04/24/25  0538   RBC 4.89 3.82 3.30*   HGB 14.4 11.6* 9.9*   HCT 45.2 34.4* 29.7*   MCV 92.4 90.1 90.0   MCH 29.4 30.4 30.0   MCHC 31.9 33.7 33.3   RDW 13.5 13.6 13.9   NEPRELIM 2.54  --  10.41*   WBC 5.9 16.4* 11.8*   .0* 172.0 129.0*       No results for input(s): \"BNPML\" in the last 168 hours.    No results for input(s): \"TROP\", \"CK\" in the last 168 hours.    XR CHEST AP PORTABLE  (CPT=71045)  Result Date: 4/24/2025  CONCLUSION:   Lines and tubes in place as described.  Mild interstitial edema.     Dictated by (CST): Cash Patel MD on 4/24/2025 at 8:18 AM      Finalized by (CST): Cash Patel MD on 4/24/2025 at 8:21 AM          US CAROTID DOPPLER BILAT - DIAG IMG (CPT=93880)  Result Date: 4/21/2025  CONCLUSION:  1. Bilateral carotid bifurcation/proximal ICA plaque without hemodynamic significance (<49% stenosis)..  2. Antegrade flow within both vertebral arteries. 3. Small indeterminate subcentimeter nodule left lobe of the thyroid has increased since 2022. Recommend dedicated thyroid ultrasound on a nonemergent basis..     Dictated by (CST): Greg Cardoso MD on 4/21/2025 at 4:21 PM     Finalized by (CST): Greg Cardoso MD on 4/21/2025 at 4:26 PM          EKG 12 Lead  Result Date: 4/24/2025  AV dual-paced rhythm Abnormal ECG When compared with ECG of 21-APR-2025 08:02, Vent. rate has increased BY   5 BPM    EKG 12 Lead  Result Date: 4/24/2025  Sinus rhythm with Premature atrial complexes Left axis deviation Left bundle branch block Abnormal ECG When compared with ECG of 23-APR-2025 12:45, Sinus rhythm has replaced Electronic ventricular pacemaker           Exam:     General: Alert and oriented x 3. No apparent distress.   HEENT: Normocephalic, anicteric sclera, neck supple, no thyromegaly or adenopathy.  Neck: No JVD, carotids 2+, no bruits.  Cardiac: Regular rate and rhythm. S1, S2 normal. No murmur, pericardial rub, S3, or extra cardiac sounds.  Lungs: Clear without wheezes, rales, rhonchi or dullness.  Normal excursions and effort.  Abdomen: Soft, non-tender. No organosplenomegally, mass or rebound, BS-present.  Extremities: Without clubbing or cyanosis. No edema.    Neurologic: Alert and oriented, normal affect. No focal defects  Skin: Warm and dry.     Assessment and Plan:   Assessment:     1.  CAD.  Status post CABG x 3 4/23/2025.  LIMA to LAD, SVG to OM, SVG to PDA.  POD #1.     --Stable blood pressure and rhythm.  Stable hemodynamics.  -- Weaned off nitroglycerin dobutamine.     EKG: AV paced  Chest x-ray clear  Labs unremarkable.  Mild LV dysfunction  preop.     Plan:     Routine postop care.    Stephane Foster MD  [facility]  4/24/2025             [1]    ezetimibe  10 mg Oral Nightly    atorvastatin  80 mg Oral Nightly    sennosides  8.6 mg Oral BID    docusate sodium  100 mg Oral BID    metoclopramide  10 mg Intravenous Q6H    famotidine  20 mg Oral BID    Or    famotidine  20 mg Intravenous BID    metoprolol tartrate  25 mg Oral 2x Daily(Beta Blocker)    mupirocin  1 Application Nasal BID    chlorhexidine gluconate  15 mL Mouth/Throat BID    multivitamin  1 tablet Oral Daily    ascorbic acid  500 mg Oral TID    enoxaparin  40 mg Subcutaneous Daily    clopidogrel  75 mg Oral Daily    aspirin  81 mg Oral Daily    ceFAZolin  2 g Intravenous Q8H   [2]    norepinephrine      sodium chloride 10 mL/hr at 04/24/25 0700    dexmedetomidine Stopped (04/23/25 1536)    DOBUTamine Stopped (04/24/25 0843)    nitroGLYCERIN in dextrose 5% Stopped (04/23/25 1438)    norepinephrine Stopped (04/23/25 1746)    clevidipine 1 mg/hr (04/24/25 0855)    dextrose 5%-sodium chloride 0.45% 40 mL/hr (04/24/25 0700)    insulin regular 2 Units/hr (04/24/25 0700)

## 2025-04-25 NOTE — OCCUPATIONAL THERAPY NOTE
OCCUPATIONAL THERAPY EVALUATION - INPATIENT     Room Number: 229/229-A  Evaluation Date: 4/25/2025  Type of Evaluation: Initial  Presenting Problem: CABG    Physician Order: IP Consult to Occupational Therapy  Reason for Therapy: ADL/IADL Dysfunction and Discharge Planning    OCCUPATIONAL THERAPY ASSESSMENT   RN cleared pt for participation in OT session, which was completed in collaboration with PT. Upon arrival, pt was seated in bedside chair  and agreeable to activity. Pt was left in bedside chair. Call light and all needs left in reach. Handoff given to RN.    Patient is a 74 year old male admitted 4/23/2025 for s/p coronary artery bypass graft.  Prior to admission, pt was IND with ADLs and IADLs.  Patient is currently requiring up to min-mod A for ADLs overall along with min A for STS, and min A for functional transfer at RW level. Pt tolerated about 2-3  minutes of walking.  Pt has the following impairments: decreased functional strength, decreased functional reach, decreased endurance, pain, impaired standing balance, decreased muscular endurance, and increased O2 needs from baseline.    ACTIVITY TOLERANCE        Resp: 10  BP: 136/80 (126/89 after walking)  BP Location: Right arm  BP Method: Automatic  Patient Position: Sitting  Oxygen Therapy  SPO2% on Oxygen at Rest: 98  SPO2% Ambulation on Oxygen: 100    Education provided  Educated pt about role of OT and hospital therapy process as well as proper safety techniques including proper hand placement, body mechanics, safety techniques, sternal/cardiac precautions, adaptive dressing techniques, log rolling technique. Pt/family verbalized/demonstrated fair carryover.    Patient will benefit from continued skilled OT Services for duration of hospitalization, however, given the patient is functioning near baseline level do not anticipate skilled therapy needs at discharge   Pt will require increased support and supervision upon DC.    PLAN  OT Treatment Plan:  Balance activities;Energy conservation/work simplification techniques;Continued evaluation;Compensatory technique education;Fine motor coordination activities;Neuromuscluar reeducation;Equipment eval/education;Patient/Family training;Patient/Family education;Cognitive reorientation;Endurance training;UE strengthening/ROM;Functional transfer training;Visual perceptual training;IADL training;ADL training      OCCUPATIONAL THERAPY MEDICAL/SOCIAL HISTORY     Problem List  Active Problems:    Preop testing    CAD (coronary artery disease)      Past Medical History  Past Medical History[1]    Past Surgical History  Past Surgical History[2]    HOME SITUATION  Type of Home: House  Home Layout: One level  Lives With: Spouse     Toilet and Equipment: Standard height toilet  Shower/Tub and Equipment: Tub-shower combo  Other Equipment: None  Patient Regularly Uses: Glasses    SUBJECTIVE  Agreeable    OCCUPATIONAL THERAPY EXAMINATION      OBJECTIVE  Precautions: Sternal, Chest tube to suction  Fall Risk: Standard fall risk    PAIN ASSESSMENT  Rating: Unable to rate  Location: back     COGNITION  Alert and oriented X4  Delayed comprehension and comprehension    RANGE OF MOTION   Upper extremity ROM is within functional limits     STRENGTH ASSESSMENT  Upper extremity strength is within functional limits     COORDINATION  Gross Motor: WFL   Fine Motor: WFL     ACTIVITIES OF DAILY LIVING ASSESSMENT  AM-PAC ‘6-Clicks’ Inpatient Daily Activity Short Form  How much help from another person does the patient currently need…  -   Putting on and taking off regular lower body clothing?: A Lot  -   Bathing (including washing, rinsing, drying)?: A Little  -   Toileting, which includes using toilet, bedpan or urinal? : A Little  -   Putting on and taking off regular upper body clothing?: A Little  -   Taking care of personal grooming such as brushing teeth?: A Little  -   Eating meals?: A Little    AM-PAC Score:  Score: 17  Approx Degree of  Impairment: 50.11%  Standardized Score (AM-PAC Scale): 37.26  CMS Modifier (G-Code): CK       FUNCTIONAL TRANSFER ASSESSMENT        STS: min A  Chair Transfer: min A    FUNCTIONAL ADL ASSESSMENT  UB ADLs: min A  LB ADLs: mod A  Toileting: min A    The patient's Approx Degree of Impairment: 50.11% has been calculated based on documentation in the [facility] '6 clicks' Inpatient Daily Activity Short Form.  Research supports that patients with this level of impairment may benefit from home health. Final disposition will be made by interdisciplinary medical team.    OT Goals  Patients self stated goal is: none stated     Patient will complete functional transfer with SBA  Comment:     Patient will complete toileting with SBA  Comment:     Patient will tolerate standing for 4 minutes in prep for adls with SBA   Comment:    Patient will complete item retrieval with SBA  Comment:          Goals  on: 25  Frequency: 3-5x/week    Patient Evaluation Complexity Level:   Occupational Profile/Medical History MODERATE - Expanded review of history including review of medical or therapy record   Specific performance deficits impacting engagement in ADL/IADL MODERATE  3 - 5 performance deficits   Client Assessment/Performance Deficits MODERATE - Comorbidities and min to mod modifications of tasks    Clinical Decision Making MODERATE - Analysis of occupational profile, detailed assessments, several treatment options    Overall Complexity MODERATE     OT Session Time: 20 minutes  Self-Care Home Management: 10 minutes    [facility]  [facility]  Occupational Therapy  [phone]         [1]   Past Medical History:   Acid reflux    Age-related nuclear cataract of both eyes    Back problem    CAD (coronary artery disease)    COPD (chronic obstructive pulmonary disease) (HCC)    Coronary atherosclerosis    Decreased lung capacity    Deviated septum    Esophageal  reflux    Floaters, right    Fractured nasal bones    Hearing difficulty    High blood pressure    High cholesterol    Macular degeneration    POAG (primary open-angle glaucoma)    Started Latanoprost in both eyes at bedtime- RJM    Shortness of breath    with exertion    Tubular adenoma of colon    polyps removed; if interested in continuing screening, next in 2029    Visual impairment    Glasses   [2]   Past Surgical History:  Procedure Laterality Date    Angiogram      Was told he had a blockage     Cardiac pacemaker placement      [employer] per patient    Colonoscopy      [facility] about 5 yrs ago    Colonoscopy N/A 08/20/2019    Procedure: COLONOSCOPY;  Surgeon: IRINEO Villasenor MD;  Location: [facility] ENDOSCOPY    Colonoscopy N/A 4/23/2024    Procedure: COLONOSCOPY;  Surgeon: IRINEO Villasenor MD;  Location: [facility] ENDOSCOPY    Egd      Other surgical history      nasal surgery

## 2025-04-25 NOTE — PROGRESS NOTES
[facility]  part of [facility]    Progress Note    Carolina Montanez Patient Status:  Inpatient    1950 MRN [id number]   Location [facility] 2W/SW Attending Eulalio Hernandez MD   Hosp Day # 2 PCP Logan Hernandez MD     Subjective:  OOB in chair on exam, no acute events noted overnight.  Still with mild incisional pain, relieved with current meds.  Currently denies: CP, dizziness, palpitations, or SOB.  No visitors at bedside currently    Objective:  /79 (BP Location: Right arm)   Pulse 66   Temp 97.4 °F (36.3 °C) (Temporal)   Resp 12   Ht 5' 9.02\" (1.753 m)   Wt 162 lb 14.7 oz (73.9 kg)   SpO2 97%   BMI 24.05 kg/m²     Temp (24hrs), Av.6 °F (36.4 °C), Min:97.2 °F (36.2 °C), Max:97.8 °F (36.6 °C)      Intake/Output:    Intake/Output Summary (Last 24 hours) at 2025 0927  Last data filed at 2025 0600  Gross per 24 hour   Intake 62462 ml   Output 910 ml   Net 19628 ml       Wt Readings from Last 3 Encounters:   25 162 lb 14.7 oz (73.9 kg)   25 159 lb 9.6 oz (72.4 kg)   25 159 lb (72.1 kg)       Allergies:  Allergies[1]    Labs:         Physical Exam:  Blood pressure 128/79, pulse 66, temperature 97.4 °F (36.3 °C), temperature source Temporal, resp. rate 12, height 5' 9.02\" (1.753 m), weight 162 lb 14.7 oz (73.9 kg), SpO2 97%.  General: NAD  Neck: No JVD  Lungs: Clear anteriorly/diminished laterally   Chest tubes= 140 cc/12 hours- no air leak   Heart: RRR, S1, S2  Abdomen: Soft/Round, NT/ND, BS+x4 diminished/+Flatus/no BM   Extremities: Warm, dry, no LE edema bilat  Pulses: 1+ bilat DP  Skin: sternotomy incision drsg: CDI w/TPW intact/L SVG site dressing CDI  Neurological:  AAOx3, MAEW    Assessment/Plan:  S/P CABG x 3 POD # 2  Encourage pulm toilet: IS, EZ Pap.  Stable on RA.  CXR reviewed.   Pain meds as needed -wean off narcotics as tolerated  Remains hemodynamically stable off vasoactive medications-DC chest tubes/Epstein/Cordis today, update PCU  status-hospitalist already following  Increase activity-OOB to chair and ambulate in hallway   Scds/Lovenox prophylaxis DVT prevention   Expected post op volume overload: Mild on exam, Lasix x 1 this a.m., monitor BMP, daily weight, I's/O  Expected post op anemia w/o clinical significance/stable-Hgb 9.9, start iron and continue for 1 month postop  IV Insulin protocol w/no hx: DM-transitioned off insulin drip yesterday, make adjustments accordingly  Hx: Thyroid nodule which has increased since 2022 as noted on his pre op carotid ultrasound. Pt aware. Recommend further discussion with his PCP during his follow up appt   12-lead EKG reviewed/hx: PPM     Discharge planning: pt lives with his wife.  Plan for home with HH once medically stable, likely 48 hours.  Continue postop CV DC education, follow-up appointments as directed.  Appreciate CM/SW to assist with DC planning    Plan of care discussed w/patient/RN  Serina Fuentes, APRN  4/25/25  0 930       [1] No Known Allergies

## 2025-04-25 NOTE — PROGRESS NOTES
Daily Pulmonary/ICU/Critical Care Progress Note          SUBJECTIVE:  Sitting a chair  On RA and afebrile  Reports he feels tired.       ALLERGIES:  Allergies[1]      MEDS:  Home Medications:  Medications Taking[2]  Scheduled Medication:  Scheduled Medications[3]  Continuous Infusing Medication:  Medication Infusions[4]  PRN Medications:  PRN Medications[5]       PHYSICAL EXAM:  /85 (BP Location: Right arm)   Pulse 63   Temp 97.4 °F (36.3 °C) (Temporal)   Resp 11   Ht 5' 9.02\" (1.753 m)   Wt 162 lb 14.7 oz (73.9 kg)   SpO2 94%   BMI 24.05 kg/m²      CONSTITUTIONAL: alert, oriented, no apparent distress  HEENT: atraumatic normocephalic  MOUTH: mucous membranes are moist. No OP exudates  NECK/THROAT: no JVD. Trachea midline. No obvious thyromegaly.  + right CVC  LUNG: clear upper b/l no wheezing, crackles. Diminished bases. Chest symmetric with respiratory motion  HEART: regular rate and rhythm, no obvious murmers or gallops noted. + chest tubes  ABD: soft non tender. + bowel sounds. No organomegaly noted  EXT: no clubbing, cyanosis. Trace LE edema noted  NEURO/MUSCULOSKELETAL: no gross deficits  SKIN: warm, dry. No obvious lesions noted  LYMPH: no obvious LAD      IMAGES:   Reviewed in EMR  CXR 4/25/25  Unchanged mild basilar opacities likely atelectasis and effusion; persistent small vague opacities in the mid to lower lungs likely foci of atelectasis         LABS:  Recent Labs   Lab 04/21/25  0743 04/23/25  1157 04/24/25  0538   RBC 4.89 3.82 3.30*   HGB 14.4 11.6* 9.9*   HCT 45.2 34.4* 29.7*   MCV 92.4 90.1 90.0   MCH 29.4 30.4 30.0   MCHC 31.9 33.7 33.3   RDW 13.5 13.6 13.9   NEPRELIM 2.54  --  10.41*   WBC 5.9 16.4* 11.8*   .0* 172.0 129.0*       Recent Labs   Lab 04/21/25  0743 04/23/25  1157 04/23/25  1609 04/23/25  2333 04/24/25  0538   * 120* 136*  --  139*   BUN 13 13 13  --  12   CREATSERUM 1.03 0.97 1.13  --  1.04   EGFRCR 75 81 67  --  74   CA 9.9 9.0 9.1  --  9.3   ALB 4.4  --    --   --   --     143 144  --  144   K 4.0 4.0 3.7 4.2 4.1    112 110  --  110   CO2 27.0 24.0 25.0  --  25.0   ALKPHO 66  --   --   --   --    AST 27  --   --   --   --    ALT 30  --   --   --   --    BILT 0.9  --   --   --   --    TP 6.8  --   --   --   --          ASSESSMENT/PLAN:  S/p CABG x 3 for CAD  -extubated   -pain control  -chest tube mgmt as per CV  -not on cardiac gtts  -on asa, statin, plavix, bb    HTN  -bb    HLP  -statin and zetia    Proph  -DVT: lovenox    Dispo  -full code      Thank you for the opportunity to care for Carolina Quiñones DO, MPH  Pulmonary Critical Care Medicine  [facility]          [1] No Known Allergies  [2]   Outpatient Medications Marked as Taking for the 4/23/25 encounter (Hospital Encounter)   Medication Sig Dispense Refill    lisinopril 10 MG Oral Tab Take 1 tablet (10 mg total) by mouth at bedtime.      atorvastatin 20 MG Oral Tab Take 1 tablet (20 mg total) by mouth daily. (Patient taking differently: Take 1 tablet (20 mg total) by mouth nightly.) 90 tablet 3    ezetimibe 10 MG Oral Tab Take 1 tablet (10 mg total) by mouth daily. (Patient taking differently: Take 1 tablet (10 mg total) by mouth nightly.) 90 tablet 3    montelukast 10 MG Oral Tab Take 1 tablet (10 mg total) by mouth nightly. 90 tablet 3    aspirin 81 MG Oral Tab EC Take 1 tablet (81 mg total) by mouth at bedtime.      Calcium Carbonate-Vitamin D (CALTRATE 600+D OR) Take by mouth nightly.      Probiotic Product (PROBIOTIC DAILY OR) Take by mouth at bedtime.     [3]    ferrous sulfate  325 mg Oral BID with meals    ezetimibe  10 mg Oral Nightly    atorvastatin  80 mg Oral Nightly    insulin aspart  1-5 Units Subcutaneous TID CC    sennosides  8.6 mg Oral BID    docusate sodium  100 mg Oral BID    metoclopramide  10 mg Intravenous Q6H    famotidine  20 mg Oral BID    metoprolol tartrate  25 mg Oral 2x Daily(Beta Blocker)    mupirocin  1  Application Nasal BID    chlorhexidine gluconate  15 mL Mouth/Throat BID    multivitamin  1 tablet Oral Daily    ascorbic acid  500 mg Oral TID    enoxaparin  40 mg Subcutaneous Daily    clopidogrel  75 mg Oral Daily    aspirin  81 mg Oral Daily   [4] [5]   melatonin    polyethylene glycol (PEG 3350)    bisacodyl    ondansetron    potassium chloride **OR** potassium chloride    magnesium sulfate in dextrose 5%    magnesium sulfate in sterile water for injection    acetaminophen **OR** HYDROcodone-acetaminophen **OR** HYDROcodone-acetaminophen    morphINE **OR** [DISCONTINUED] morphINE    glucose **OR** glucose **OR** glucose-vitamin C **OR** dextrose **OR** glucose **OR** glucose **OR** glucose-vitamin C

## 2025-04-25 NOTE — PLAN OF CARE
Problem: Patient Centered Care  Goal: Patient preferences are identified and integrated in the patient's plan of care  Description: Interventions:- What would you like us to know as we care for you?  I have two daughters- Provide timely, complete, and accurate information to patient/family- Incorporate patient and family knowledge, values, beliefs, and cultural backgrounds into the planning and delivery of care- Encourage patient/family to participate in care and decision-making at the level they choose- Honor patient and family perspectives and choices  Outcome: Progressing     Problem: Diabetes/Glucose Control  Goal: Glucose maintained within prescribed range  Description: INTERVENTIONS:- Monitor Blood Glucose as ordered- Assess for signs and symptoms of hyperglycemia and hypoglycemia- Administer ordered medications to maintain glucose within target range- Assess barriers to adequate nutritional intake and initiate nutrition consult as needed- Instruct patient on self management of diabetes  Outcome: Progressing     Problem: Patient/Family Goals  Goal: Patient/Family Long Term Goal  Description: Patient's Long Term Goal: Discharge home  Interventions:-   - Medications as ordered  - Tests and procedures as ordered  - PT/OT as ordered  - See additional Care Plan goals for specific interventions  Outcome: Progressing  Goal: Patient/Family Short Term Goal  Description: Patient's Short Term Goal: Increased strength and mobility  Interventions: -   - Medications as ordered  - Tests and procedures as ordered  - PT/OT as ordered  - See additional Care Plan goals for specific interventions  Outcome: Progressing     Problem: CARDIOVASCULAR - ADULT  Goal: Maintains optimal cardiac output and hemodynamic stability  Description: INTERVENTIONS:- Monitor vital signs, rhythm, and trends- Monitor for bleeding, hypotension and signs of decreased cardiac output- Evaluate effectiveness of vasoactive medications to optimize  hemodynamic stability- Monitor arterial and/or venous puncture sites for bleeding and/or hematoma- Assess quality of pulses, skin color and temperature- Assess for signs of decreased coronary artery perfusion - ex. Angina- Evaluate fluid balance, assess for edema, trend weights  Outcome: Progressing  Goal: Absence of cardiac arrhythmias or at baseline  Description: INTERVENTIONS:- Continuous cardiac monitoring, monitor vital signs, obtain 12 lead EKG if indicated- Evaluate effectiveness of antiarrhythmic and heart rate control medications as ordered- Initiate emergency measures for life threatening arrhythmias- Monitor electrolytes and administer replacement therapy as ordered  Outcome: Progressing     Problem: RESPIRATORY - ADULT  Goal: Achieves optimal ventilation and oxygenation  Description: INTERVENTIONS:- Assess for changes in respiratory status- Assess for changes in mentation and behavior- Position to facilitate oxygenation and minimize respiratory effort- Oxygen supplementation based on oxygen saturation or ABGs- Provide Smoking Cessation handout, if applicable- Encourage broncho-pulmonary hygiene including cough, deep breathe, Incentive Spirometry- Assess the need for suctioning and perform as needed- Assess and instruct to report SOB or any respiratory difficulty- Respiratory Therapy support as indicated- Manage/alleviate anxiety- Monitor for signs/symptoms of CO2 retention  Outcome: Progressing     Problem: Integumentary status not within defined limits  Goal: Pt's integumentary status will be adequate for discharge  Outcome: Progressing     Problem: PAIN - ADULT  Goal: Verbalizes/displays adequate comfort level or patient's stated pain goal  Description: INTERVENTIONS:- Encourage pt to monitor pain and request assistance- Assess pain using appropriate pain scale- Administer analgesics based on type and severity of pain and evaluate response- Implement non-pharmacological measures as appropriate and  evaluate response- Consider cultural and social influences on pain and pain management- Manage/alleviate anxiety- Utilize distraction and/or relaxation techniques- Monitor for opioid side effects- Notify MD/LIP if interventions unsuccessful or patient reports new pain- Anticipate increased pain with activity and pre-medicate as appropriate  Outcome: Progressing

## 2025-04-25 NOTE — CM/SW NOTE
10:26 AM  Sw received MDO per protocol order. Sw attempted to meet with Pt but asked if Sw could come back later as he had just finished with PT and OT.     Per conversation with PT and OT, they are recommending HH. HH referrals were previously sent. Sw entered and uploaded f2f. Sw will F/up to [facility] if Pt is agreeable.     1:11PM     04/25/25 1300   CM/SW Referral Data   Referral Source Physician   Reason for Referral Discharge planning   Informant Spouse/Significant Other   Medical Hx   Does patient have an established PCP? Yes  (Logan Hernandez MD)   Significant Past Medical/Mental Health Hx HTN, HLD, BPH, pacemaker, CAD, glaucoma, GERD, and emphysema.   Patient Info   Patient's Current Mental Status at Time of Assessment Alert;Oriented   Patient's Home Environment House   Number of Levels in Home 2   Number of Stair in Home 15  (13 inside 2 to enter/exit)   Patient lives with Spouse/Significant other   Patient Status Prior to Admission   Independent with ADLs and Mobility Yes   Services in place prior to admission DME/Supplies at home   Type of DME/Supplies Shower Chair   Discharge Needs   Anticipated D/C needs Home health care     Sw met with Pt and wife bedside. Pt was sleeping at time of assessment but previously alert and oriented.     Pt's wife confirmed PCP and address on file. She also confirmed no use of medical equipment or DME and Pt being independent at baseline. Pt's wife did state they obtained recommended shower chair to use during recovery and Cardiac Rehab will be working with Pt.     Pt's wife open to HH. Sw provided list of HH providers and encouraged a choice by EOD. Ayaz addressed all questions and left callback information for when choice is made     4:08PM  Ayaz f/up for HH choice. Pt and wife have chosen [facility]. Ayaz reserved in [place] and notified liaison Marlen via secure chat.    Plan: Home with [facility] pending med clearance     SW to remain available for support and/or discharge  planning.    Kami Beckman MSW, LSW   [id number]

## 2025-04-25 NOTE — HOME CARE LIAISON
[facility] Spoke w/ patient who is agreeable with [facility]. Contact information placed on AVS.

## 2025-04-25 NOTE — DISCHARGE INSTRUCTIONS
CARDIAC SURGERY DISCHARGE INSTRUCTIONS  Shower daily and clean your surgical incisions with soap and water then swab with Betadine 2 x day until your follow up office visit with Dr. Hernandez's office.  Do not drive for one month  Do not lift/push/pull greater than 10 lbs for 3 months   Do not soak (submerge) your incision in water such as tub/pool/etc for one month  Take Plavix 75 mg daily with Aspirin 81 mg daily for 3 months. After 3 months- stop the Plavix and continue on Aspirin 81 mg daily.    Sometimes managing your health at home requires assistance.  The [facility] team has recognized your preference to use [facility].  They can be reached by phone at [phone].  The fax number for your reference is [phone].  A representative from the home health agency will contact you or your family to schedule your first visit.

## 2025-04-25 NOTE — PHYSICAL THERAPY NOTE
PHYSICAL THERAPY EVALUATION - INPATIENT     Room Number: 229/229-A  Evaluation Date: 4/25/2025  Type of Evaluation: Initial   Physician Order: PT Eval and Treat    Presenting Problem: CAD, s/p CABG x 3     Reason for Therapy: Mobility Dysfunction and Discharge Planning    PHYSICAL THERAPY ASSESSMENT   Patient is a 74 year old male admitted 4/23/2025 for CAD, s/p CABG x 3. Prior to admission, patient's baseline is Independent with ADLs/iADLs/functional mobility. Patient is currently functioning below baseline with bed mobility, transfers, gait, stair negotiation, standing prolonged periods, and performing household tasks. Patient is requiring contact guard assist and minimal assist as a result of the following impairments: decreased functional strength, decreased endurance/aerobic capacity, pain, impaired standing balance, decreased muscular endurance, and medical status. Physical Therapy will continue to follow for duration of hospitalization.    Patient will benefit from continued skilled PT Services at discharge to promote prior level of function and safety with additional support and return home with home health PT.    PLAN DURING HOSPITALIZATION  Nursing Mobility Recommendation : 1 Assist  PT Device Recommendation: Rolling walker  PT Treatment Plan: Bed mobility, Body mechanics, Coordination, Energy conservation, Endurance, Patient education, Gait training, Strengthening, Stair training, Transfer training, Balance training  Rehab Potential : Good  Frequency (Obs):  (5-7x/week)     PHYSICAL THERAPY MEDICAL/SOCIAL HISTORY     Problem List  Active Problems:    Preop testing    CAD (coronary artery disease)    HOME SITUATION  Type of Home: House  Home Layout: Two level  Stairs to Enter : 2   Railing: No    Stairs to Bedroom: 13    Railing: Yes    Lives With: Spouse  (daughter coming in from [place] to stay with patient)  Patient Regularly Uses: Glasses     SUBJECTIVE  Agreeable     PHYSICAL THERAPY EXAMINATION    OBJECTIVE  Precautions: Sternal, Chest tube to suction  Fall Risk: Standard fall risk    PAIN ASSESSMENT  Rating: Unable to rate  Location: sternal incision site  Management Techniques: Body mechanics, Repositioning    COGNITION  Overall Cognitive Status:  WFL - within functional limits    RANGE OF MOTION AND STRENGTH ASSESSMENT  Upper extremity ROM and strength are within functional limits   Lower extremity ROM is within functional limits   Lower extremity strength is within functional limits     BALANCE  Static Sitting: Good  Dynamic Sitting: Fair +  Static Standing: Fair  Dynamic Standing: Fair -    ACTIVITY TOLERANCE  Pulse: 73  Heart Rate Source: Monitor     BP: 136/80 (126/89 mmHg with activity)  BP Location: Right arm  BP Method: Automatic  Patient Position: Sitting    O2 WALK  Oxygen Therapy  SPO2% on Room Air at Rest: 98  SPO2% Ambulation on Room Air: 96    AM-PAC '6-Clicks' INPATIENT SHORT FORM - BASIC MOBILITY  How much difficulty does the patient currently have...  Patient Difficulty: Turning over in bed (including adjusting bedclothes, sheets and blankets)?: A Little   Patient Difficulty: Sitting down on and standing up from a chair with arms (e.g., wheelchair, bedside commode, etc.): A Little   Patient Difficulty: Moving from lying on back to sitting on the side of the bed?: A Little   How much help from another person does the patient currently need...   Help from Another: Moving to and from a bed to a chair (including a wheelchair)?: A Little   Help from Another: Need to walk in hospital room?: A Little   Help from Another: Climbing 3-5 steps with a railing?: A Little     AM-PAC Score:  Raw Score: 18   Approx Degree of Impairment: 46.58%   Standardized Score (AM-PAC Scale): 43.63   CMS Modifier (G-Code): CK    FUNCTIONAL ABILITY STATUS  Functional Mobility/Gait Assessment  Gait Assistance: Contact guard assist  Distance (ft): 150 ft  Assistive Device: Rolling walker  Pattern: Shuffle (decreased  cristina speed, decreased step length, guarded - cues provided for shoulder depression)  Sit to Stand: minimal assist    Exercise/Education Provided:  Body mechanics  Energy conservation  Functional activity tolerated  Gait training  Posture  Transfer training    Skilled Therapy Provided: Patient sitting in bedside chair upon arrival. RN approved activity. Educated patient on POC and benefits of mobilization. Agreeable to participate. Patient reporting sternal incision site pain, not quantified per the pain scale. Educated patient on post-op sternal precautions, use of heart pillow, log rolling, frequent position changes, and progressive mobility as tolerated. Patient benefits from increased time and cues in order to complete functional mobility. Patient very soft spoke throughout session. Encouraged patient to sit in bedside chair for all meals and ambulate as tolerated with nursing staff.     The patient's Approx Degree of Impairment: 46.58% has been calculated based on documentation in the [facility] '6 clicks' Inpatient Basic Mobility Short Form.  Research supports that patients with this level of impairment may benefit from HHPT.  Final disposition will be made by interdisciplinary medical team.    Patient End of Session: Up in chair, Needs met, Call light within reach, RN aware of session/findings, All patient questions and concerns addressed, Hospital anti-slip socks    CURRENT GOALS  Goals to be met by: 5/2/25  Patient Goal Patient's self-stated goal is: none stated   Goal #1 Patient is able to demonstrate supine - sit EOB @ level: supervision     Goal #1   Current Status    Goal #2 Patient is able to demonstrate transfers Sit to/from Stand at assistance level: supervision with walker - rolling     Goal #2  Current Status    Goal #3 Patient is able to ambulate 300 feet with assist device: walker - rolling at assistance level: supervision   Goal #3   Current Status    Goal #4 Patient will negotiate 12 stairs/one  curb w/ assistive device and supervision   Goal #4   Current Status    Goal #5 Patient to demonstrate independence with home activity/exercise instructions provided to patient in preparation for discharge.   Goal #5   Current Status      Patient Evaluation Complexity Level:  History Moderate - 1 or 2 personal factors and/or co-morbidities   Examination of body systems Moderate - addressing a total of 3 or more elements   Clinical Presentation  Moderate - Evolving   Clinical Decision Making  Moderate Complexity     Gait Training: 10 minutes  Therapeutic Activity:  14 minutes

## 2025-04-25 NOTE — PROGRESS NOTES
[facility]  Cardiology Progress Note    Carolina Montanez Patient Status:  Inpatient    1950 MRN [id number]   Location [facility] 2W/ Attending Mady Hamlin MD   Hosp Day # 2 PCP Logan Hernandez MD     Subjective     Feeling a little better today.  No overnight issues.    Objective:   Patient Vitals for the past 24 hrs:   BP Temp Temp src Pulse Resp SpO2 Height Weight   25 0800 128/79 97.4 °F (36.3 °C) Temporal 66 12 97 % -- --   25 0700 123/70 -- -- 70 17 96 % -- --   25 0600 125/73 -- -- 77 20 100 % 5' 9.02\" (1.753 m) 162 lb 14.7 oz (73.9 kg)   25 0500 126/86 -- -- 64 16 95 % -- --   25 0400 116/72 97.5 °F (36.4 °C) Temporal 64 10 96 % -- --   25 0300 140/79 -- -- 88 23 93 % -- --   25 0200 110/65 -- -- 64 10 94 % -- --   25 0100 116/75 -- -- 70 11 94 % -- --   25 0000 125/76 97.7 °F (36.5 °C) Temporal 75 11 97 % -- --   25 2300 118/70 -- -- 63 10 95 % -- --   25 2200 110/70 -- -- 64 10 96 % -- --   25 2100 118/83 -- -- 66 10 98 % -- --   25 2000 121/69 97.8 °F (36.6 °C) Temporal 71 11 95 % -- --   25 1900 123/71 -- -- 68 11 97 % -- --   25 1800 135/75 -- -- 84 16 97 % -- --   25 1700 115/69 -- -- 67 12 97 % -- --   25 1600 110/66 97.2 °F (36.2 °C) Temporal 72 10 93 % -- --   25 1500 129/66 -- -- 86 18 97 % -- --   25 1400 122/78 -- -- 72 14 97 % -- --   25 1300 (!) 119/103 -- -- 86 16 95 % -- --   25 1200 120/76 97.7 °F (36.5 °C) Temporal 69 15 96 % -- --   25 1100 107/50 -- -- 75 16 98 % -- --   25 1000 102/61 -- -- 68 11 96 % -- --     Intake/Output:   Last 3 shifts:   Intake/Output                   25 07 - 25 0659 25 07 - 25 0659 25 0700 - 25 0659       Intake    P.O.  --  1140  --    P.O. -- 1140 --    I.V.  3091.2  99457.1  --    I.V. -- 25299 --    Volume (mL) Insulin 46 63 --    Volume (mL)  Nitroglycerin 31.3 -- --    Volume (mL) Dobutamine 69.8 80 --    Volume (mL) Dexmedetomidine 91.9 -- --    Volume (mL) Norepinephrine 1.3 -- --    Volume (mL) (sodium chloride 0.9% infusion) 167.7 219.8 --    Volume (mL) (dextrose 5%-sodium chloride 0.45% infusion) 683.3 39.3 --    Volume (mL) (sodium chloride 0.9% infusion) 2000 -- --    Blood  1300  --  --    Cell Saver Blood  600 -- --    Volume (Transfuse platelets) 350 -- --    Volume (Transfuse platelets) 350 -- --    IV PIGGYBACK  1340  --  --    Volume (mL) (acetaminophen (Ofirmev) 10 mg/mL infusion premix 1,000 mg) 200 -- --    Volume (mL) (potassium chloride 20 mEq/100mL IVPB premix 20 mEq) 100 -- --    Volume (mL) (albumin human (Albumin) 5% injection 12.5 g) 1000 -- --    Volume (mL) (ceFAZolin (Ancef) 2g in 10mL IV syringe premix) 30 -- --    Volume (mL) (ceFAZolin (Ancef) 2g in 10mL IV syringe premix) 10 -- --    Total Intake 5731.2 96789.1 --       Output    Urine  3325  650  --    Urine 850 -- --    Output (mL) (Urethral Catheter Latex;Double-lumen;Temperature probe) 2475 650 --    Chest Tube  345  440  --    Output (mL) (Y Chest Tube 1 and 2 Anterior Mediastinal 32 Fr. Anterior Pleural 32 Fr.) 345 440 --    Total Output 3670 1090 --       Net I/O     2061.2 68305.1 --             Scheduled Meds: Scheduled Medications[1]    Continuous Infusions: Medication Infusions[2]    Results:     Lab Results   Component Value Date    WBC 11.8 (H) 04/24/2025    HGB 9.9 (L) 04/24/2025    HCT 29.7 (L) 04/24/2025    .0 (L) 04/24/2025    CREATSERUM 1.04 04/24/2025    BUN 12 04/24/2025     04/24/2025    K 4.1 04/24/2025     04/24/2025    CO2 25.0 04/24/2025     (H) 04/24/2025    CA 9.3 04/24/2025    ALB 4.4 04/21/2025    ALKPHO 66 04/21/2025    BILT 0.9 04/21/2025    TP 6.8 04/21/2025    AST 27 04/21/2025    ALT 30 04/21/2025    PTT 30.4 04/23/2025    INR 1.45 (H) 04/23/2025    T4F 1.0 08/01/2022    TSH 1.926 11/21/2023    PSA 0.68 06/07/2023     ESRML 18 04/04/2023    CRP 0.31 (H) 04/04/2023    MG 2.1 04/24/2025    B12 1,098 (H) 11/21/2023       Recent Labs   Lab 04/23/25  1157 04/23/25  1609 04/23/25  2333 04/24/25  0538   * 136*  --  139*   BUN 13 13  --  12   CREATSERUM 0.97 1.13  --  1.04   CA 9.0 9.1  --  9.3    144  --  144   K 4.0 3.7 4.2 4.1    110  --  110   CO2 24.0 25.0  --  25.0     Recent Labs   Lab 04/21/25  0743 04/23/25  1157 04/24/25  0538   RBC 4.89 3.82 3.30*   HGB 14.4 11.6* 9.9*   HCT 45.2 34.4* 29.7*   MCV 92.4 90.1 90.0   MCH 29.4 30.4 30.0   MCHC 31.9 33.7 33.3   RDW 13.5 13.6 13.9   NEPRELIM 2.54  --  10.41*   WBC 5.9 16.4* 11.8*   .0* 172.0 129.0*       No results for input(s): \"BNPML\" in the last 168 hours.    No results for input(s): \"TROP\", \"CK\" in the last 168 hours.    XR CHEST AP PORTABLE  (CPT=71045)  Result Date: 4/24/2025  CONCLUSION:   Lines and tubes in place as described.  Mild interstitial edema.     Dictated by (CST): Cash Patel MD on 4/24/2025 at 8:18 AM     Finalized by (CST): Cash Patel MD on 4/24/2025 at 8:21 AM          EKG 12 Lead  Result Date: 4/24/2025  AV dual-paced rhythm Abnormal ECG When compared with ECG of 21-APR-2025 08:02, Vent. rate has increased BY   5 BPM Confirmed by ARLENE PALACIOS ([id number]) on 4/24/2025 2:35:15 PM    EKG 12 Lead  Result Date: 4/24/2025  Sinus rhythm with Premature atrial complexes Left axis deviation Left bundle branch block Abnormal ECG When compared with ECG of 23-APR-2025 12:45, Sinus rhythm has replaced Electronic ventricular pacemaker           Exam:     General: Alert and oriented x 3. No apparent distress.   HEENT: Normocephalic, anicteric sclera, neck supple, no thyromegaly or adenopathy.  Neck: No JVD, carotids 2+, no bruits.  Cardiac: Regular rate and rhythm. S1, S2 normal. No murmur, pericardial rub, S3, or extra cardiac sounds.  Lungs: Clear without wheezes, rales, rhonchi or dullness.  Normal excursions and effort.  Abdomen: Soft,  non-tender. No organosplenomegally, mass or rebound, BS-present.  Extremities: Without clubbing or cyanosis. No edema.    Neurologic: Alert and oriented, normal affect. No focal defects  Skin: Warm and dry.     Assessment and Plan:   Assessment:     1.  CAD.  Status post CABG x 3 4/23/2025.  LIMA to LAD, SVG to OM, SVG to PDA.  POD #2.     --Stable blood pressure and rhythm.  Stable hemodynamics.  -- Weaned off nitroglycerin dobutamine.     EKG: AV paced  Chest x-ray clear  Labs unremarkable.  Mild LV dysfunction preop.     Plan:     Routine postop care.    Stephane Foster MD  [facility]  4/25/2025             [1]    ferrous sulfate  325 mg Oral BID with meals    ezetimibe  10 mg Oral Nightly    atorvastatin  80 mg Oral Nightly    insulin aspart  1-5 Units Subcutaneous TID CC    sennosides  8.6 mg Oral BID    docusate sodium  100 mg Oral BID    metoclopramide  10 mg Intravenous Q6H    famotidine  20 mg Oral BID    metoprolol tartrate  25 mg Oral 2x Daily(Beta Blocker)    mupirocin  1 Application Nasal BID    chlorhexidine gluconate  15 mL Mouth/Throat BID    multivitamin  1 tablet Oral Daily    ascorbic acid  500 mg Oral TID    enoxaparin  40 mg Subcutaneous Daily    clopidogrel  75 mg Oral Daily    aspirin  81 mg Oral Daily   [2]

## 2025-04-25 NOTE — DIETARY NOTE
NUTRITION EDUCATION NOTE     Received consult for cardiac nutrition education. Verbally reviewed basic cardiac diet restrictions. Provided with Eating Heart-Healthy handout to reinforce. Receptive to instruction. May benefit from outpt f/u. Expect good compliance. RD contact information provided to pt.        Shira Mckeon MS, LIDIA, RDN, LDN  Clinical Dietitian  P: [phone]

## 2025-04-25 NOTE — PLAN OF CARE
Problem: Patient Centered Care  Goal: Patient preferences are identified and integrated in the patient's plan of care  Description: Interventions:- What would you like us to know as we care for you?  I have two daughters- Provide timely, complete, and accurate information to patient/family- Incorporate patient and family knowledge, values, beliefs, and cultural backgrounds into the planning and delivery of care- Encourage patient/family to participate in care and decision-making at the level they choose- Honor patient and family perspectives and choices  Outcome: Progressing     Problem: Diabetes/Glucose Control  Goal: Glucose maintained within prescribed range  Description: INTERVENTIONS:- Monitor Blood Glucose as ordered- Assess for signs and symptoms of hyperglycemia and hypoglycemia- Administer ordered medications to maintain glucose within target range- Assess barriers to adequate nutritional intake and initiate nutrition consult as needed- Instruct patient on self management of diabetes  Outcome: Progressing     Problem: Patient/Family Goals  Goal: Patient/Family Long Term Goal  Description: Patient's Long Term Goal: Interventions:- - See additional Care Plan goals for specific interventions  Outcome: Progressing  Goal: Patient/Family Short Term Goal  Description: Patient's Short Term Goal: Interventions: - - See additional Care Plan goals for specific interventions  Outcome: Progressing     Problem: CARDIOVASCULAR - ADULT  Goal: Maintains optimal cardiac output and hemodynamic stability  Description: INTERVENTIONS:- Monitor vital signs, rhythm, and trends- Monitor for bleeding, hypotension and signs of decreased cardiac output- Evaluate effectiveness of vasoactive medications to optimize hemodynamic stability- Monitor arterial and/or venous puncture sites for bleeding and/or hematoma- Assess quality of pulses, skin color and temperature- Assess for signs of decreased coronary artery perfusion - ex. Angina-  Evaluate fluid balance, assess for edema, trend weights  Outcome: Progressing  Goal: Absence of cardiac arrhythmias or at baseline  Description: INTERVENTIONS:- Continuous cardiac monitoring, monitor vital signs, obtain 12 lead EKG if indicated- Evaluate effectiveness of antiarrhythmic and heart rate control medications as ordered- Initiate emergency measures for life threatening arrhythmias- Monitor electrolytes and administer replacement therapy as ordered  Outcome: Progressing     Problem: RESPIRATORY - ADULT  Goal: Achieves optimal ventilation and oxygenation  Description: INTERVENTIONS:- Assess for changes in respiratory status- Assess for changes in mentation and behavior- Position to facilitate oxygenation and minimize respiratory effort- Oxygen supplementation based on oxygen saturation or ABGs- Provide Smoking Cessation handout, if applicable- Encourage broncho-pulmonary hygiene including cough, deep breathe, Incentive Spirometry- Assess the need for suctioning and perform as needed- Assess and instruct to report SOB or any respiratory difficulty- Respiratory Therapy support as indicated- Manage/alleviate anxiety- Monitor for signs/symptoms of CO2 retention  Outcome: Progressing     Problem: Integumentary status not within defined limits  Goal: Pt's integumentary status will be adequate for discharge  Outcome: Progressing     Problem: PAIN - ADULT  Goal: Verbalizes/displays adequate comfort level or patient's stated pain goal  Description: INTERVENTIONS:- Encourage pt to monitor pain and request assistance- Assess pain using appropriate pain scale- Administer analgesics based on type and severity of pain and evaluate response- Implement non-pharmacological measures as appropriate and evaluate response- Consider cultural and social influences on pain and pain management- Manage/alleviate anxiety- Utilize distraction and/or relaxation techniques- Monitor for opioid side effects- Notify MD/LIP if interventions  unsuccessful or patient reports new pain- Anticipate increased pain with activity and pre-medicate as appropriate  Outcome: Progressing

## 2025-04-25 NOTE — CARDIAC REHAB
Cardiac Rehab Phase I    Activity:   Chair: yes    Ambulation: yes   Assistive Device: CCU walking cart   Distance: 150 feet   Assistance needed: yes, minimal   Patient tolerated activity: well, denies symptoms.    Education:  Handouts provided and reviewed: Heart Surgery Binder.   Patient instructed on: I.S. / Acapella, Cough and Deep Breathe, and Infection Prevention.     Diet: Healthy Cardiac diet reviewed.    Disease Process: Disease process reviewed.    Weight Monitoring: Instructed on daily weights.    I.S. and/or Acapella: Patient instructed on incentive spirometer and/or acapella with good return demonstration.    Infection: Reviewed signs and symptoms of infection. Infection prevention and when to notify MD.

## 2025-04-25 NOTE — PROGRESS NOTES
Progress Note     Carolina Montanez Patient Status:  Inpatient    1950 MRN [id number]   Location [facility] 2W/SW Attending Eulalio Hernandez MD   Hosp Day # 2 PCP Logan Hernandez MD     Chief Complaint: patient presented with No chief complaint on file.      Subjective:   S: No new issues reported per RN.  Patient is off all his drips right now. Patient is working with physical therapy.    Review of Systems:   10 point ROS completed and was negative, except for pertinent positive and negatives stated in subjective.    Objective:   Vital signs:  Temp:  [97.2 °F (36.2 °C)-97.8 °F (36.6 °C)] 97.4 °F (36.3 °C)  Pulse:  [63-88] 66  Resp:  [10-23] 12  BP: (102-140)/() 128/79  SpO2:  [93 %-100 %] 97 %  AO: (106-133)/(45-62) 123/62    Wt Readings from Last 6 Encounters:   25 162 lb 14.7 oz (73.9 kg)   25 159 lb 9.6 oz (72.4 kg)   25 159 lb (72.1 kg)   24 161 lb (73 kg)   24 160 lb (72.6 kg)   23 168 lb (76.2 kg)         Physical Exam:    General: No acute distress. Alert ,         Respiratory: Clear to auscultation bilaterally. No wheezes. No rhonchi.  Cardiovascular: S1, S2. Regular rate and rhythm. No murmurs, rubs or gallops.   Abdomen: Soft, nontender, nondistended.  Positive bowel sounds. No rebound or guarding.  Neurologic: No focal neurological deficits.   Musculoskeletal: Moves all extremities.  Extremities: No edema.    Results:   Diagnostic Data:      Labs:    Labs Last 24 Hours:   BMP     CBC    Other     Na - Cl - BUN - Glu -   Hb -   PTT - Procal -   K - CO2 - Cr -   WBC - >< PLT -  INR - CRP -   Renal Lytes Endo    Hct -   Trop - D dim -   eGFR - Ca - POC Gluc  165    LFT   pBNP - Lactic -   eGFR AA - PO4 - A1c -   AST - APk - Prot -  LDL -     Mg - TSH -   ALT - T cameron - Alb -        COVID-19 Lab Results    COVID-19  Lab Results   Component Value Date    COVID19 Detected (A) 2021       Pro-Calcitonin  No results for input(s): \"PCT\" in the last 168  hours.    Cardiac  No results for input(s): \"TROP\", \"PBNP\" in the last 168 hours.    Creatinine Kinase  No results for input(s): \"CK\" in the last 168 hours.    Inflammatory Markers  No results for input(s): \"CRP\", \"CARY\", \"LDH\", \"DDIMER\" in the last 168 hours.    Imaging: Imaging data reviewed in Epic.    Medications: Scheduled Medications[1]    Assessment & Plan:   ASSESSMENT / PLAN:       Coronary artery disease status post CABG x 3 on April 23  Blood pressure stable  On nitroglycerin and dobutamine-discontinue  Cardiothoracic surgery and cardiology following  Continue aspirin, Plavix, Lipitor, Metoprolol   Discharge planning per CV,  plans home with home health in next 48 hours    Hypertension  Continue metoprolol     Hyperlipidemia  Continue Zetia and statins        Quality:  DVT Prophylaxis: Lovenox  CODE status:  FULL  DISPO: pending clinical improvement.   Estimated date of discharge: To be determined  Discharge is dependent on: Improved clinical status  At this point Patient is expected to be discharge to: Home versus rehab      Plan of care discussed with Patient and RN.     Coordinated care with providers and counseling re: treatment plan and workup     Mady Hamlin MD    Supplementary Documentation:         **Certification      PHYSICIAN Certification of Need for Inpatient Hospitalization - Initial Certification    Patient will require inpatient services that will reasonably be expected to span two midnight's based on the clinical documentation in H+P.   Based on patients current state of illness, I anticipate that, after discharge, patient will require TBD.             I personally reviewed the available laboratories, imaging including operative report. I discussed/will discuss the case with patient and her nurse. I ordered laboratories studies. I adjusted medications including not applicable today. Medical decision making high, risk is high.     >55min spent, >50% spent counseling and coordinating  care in the form of educating pt/family and d/w consultants and staff. Most of the time spent discussing the above plan.                 [1]    ezetimibe  10 mg Oral Nightly    atorvastatin  80 mg Oral Nightly    insulin aspart  1-5 Units Subcutaneous TID CC    sennosides  8.6 mg Oral BID    docusate sodium  100 mg Oral BID    metoclopramide  10 mg Intravenous Q6H    famotidine  20 mg Oral BID    metoprolol tartrate  25 mg Oral 2x Daily(Beta Blocker)    mupirocin  1 Application Nasal BID    chlorhexidine gluconate  15 mL Mouth/Throat BID    multivitamin  1 tablet Oral Daily    ascorbic acid  500 mg Oral TID    enoxaparin  40 mg Subcutaneous Daily    clopidogrel  75 mg Oral Daily    aspirin  81 mg Oral Daily

## 2025-04-25 NOTE — SLP NOTE
SPEECH DAILY NOTE - INPATIENT    ASSESSMENT & PLAN   ASSESSMENT  PPE REQUIRED. THIS THERAPIST WORE GLOVES FOR DURATION OF SESSION. HANDS WASHED UPON ENTRANCE/EXIT.    SLP f/u for ongoing meal assessment per recommendations of regular/thin liquids per BSE. RN reports pt tolerates diet and medication well with no overt clinical s/s aspiration. Pt denies any swallowing challenges.     Pt positioned upright in bedside chair, alert/cooperative. Pt afebrile, tolerating room air with oxygen status 94% prior to the start of oral trials. SLP reviewed aspiration precautions and safe swallowing compensatory strategies with the patient. Safe swallow guidelines remain written on the white board in purple. Patient v/u. Provided 1:1 assistance, pt tolerates hard solids and thin liquids via straw with no overt clinical signs/symptoms of aspiration. Oxygen status remained stable t/o the entire session. Recommend remain on current diet with adherence to aspiration precautions and swallow strategies. No further swallow services warranted at this time. Please re consult if needed. RN alerted with results and recommendations.     MOST RECENT CXR 4/25  Findings and impression:   PA catheter was removed   Stable drains.  No pneumothorax   Stable heart.  No edema   Unchanged mild basilar opacities likely atelectasis and effusion; persistent small vague opacities in the mid to lower lungs likely foci of atelectasis       Diet Recommendations - Solids: Regular  Diet Recommendations - Liquids: Thin Liquids     Aspiration Precautions: Upright position, Slow rate, Small bites, Small sips  Medication Administration Recommendations:  (as tolerated)    Patient Experiencing Pain: No              Treatment Plan  Treatment Plan/Recommendations: No further inpatient SLP service warranted    Interdisciplinary Communication: Plan posted at bedside          GOALS  Goal #1 The patient will tolerate regular consistency and thin liquids without overt signs  or symptoms of aspiration with 100 % accuracy over 1-2 session(s).  Met 4/25   Goal #2 The patient/family/caregiver will demonstrate understanding and implementation of aspiration precautions and swallow strategies independently over 1-2 session(s).    Met 4/25     FOLLOW UP  Follow Up Needed (Documentation Required): No  SLP Follow-up Date: 04/25/25  Duration: 1 week    Session: 1    If you have any questions, please contact NAGI Moraes M.S., CCC-SLP  Speech Language Pathologist  Phone Number [phone]

## 2025-04-26 NOTE — PROGRESS NOTES
Progress Note     Carolina Montanez Patient Status:  Inpatient    1950 MRN [id number]   Location [facility] 2W/SW Attending Eulalio Hernandez MD   Hosp Day # 3 PCP Logan Hernandez MD     Chief Complaint: patient presented with No chief complaint on file.      Subjective:   S: No new issues reported per RN.  CT surgery at Bedside, pt feeling weak, no issues reported.     Review of Systems:   10 point ROS completed and was negative, except for pertinent positive and negatives stated in subjective.    Objective:   Vital signs:  Temp:  [97.5 °F (36.4 °C)-98.6 °F (37 °C)] 98 °F (36.7 °C)  Pulse:  [] 91  Resp:  [10-20] 13  BP: (106-139)/() 125/84  SpO2:  [94 %-96 %] 95 %    Wt Readings from Last 6 Encounters:   25 156 lb 15.5 oz (71.2 kg)   25 159 lb 9.6 oz (72.4 kg)   25 159 lb (72.1 kg)   24 161 lb (73 kg)   24 160 lb (72.6 kg)   23 168 lb (76.2 kg)         Physical Exam:    General: No acute distress. Alert ,         Respiratory: Clear to auscultation bilaterally. No wheezes. No rhonchi.  Cardiovascular: S1, S2. Regular rate and rhythm. No murmurs, rubs or gallops.   Abdomen: Soft, nontender, nondistended.  Positive bowel sounds. No rebound or guarding.  Neurologic: No focal neurological deficits.   Musculoskeletal: Moves all extremities.  Extremities: No edema.    Results:   Diagnostic Data:      Labs:    Labs Last 24 Hours:   BMP     CBC    Other     Na 141 Cl 104 BUN 26 Glu 110   Hb 11.2   PTT - Procal -   K 3.9 CO2 31.0 Cr 1.10   WBC 13.4 >< .0  INR - CRP -   Renal Lytes Endo    Hct 33.8   Trop - D dim -   eGFR - Ca 9.6 POC Gluc  128    LFT   pBNP - Lactic -   eGFR AA - PO4 - A1c -   AST - APk - Prot -  LDL -     Mg 2.3 TSH -   ALT - T cameron - Alb -        COVID-19 Lab Results    COVID-19  Lab Results   Component Value Date    COVID19 Detected (A) 2021       Pro-Calcitonin  No results for input(s): \"PCT\" in the last 168 hours.    Cardiac  No results  for input(s): \"TROP\", \"PBNP\" in the last 168 hours.    Creatinine Kinase  No results for input(s): \"CK\" in the last 168 hours.    Inflammatory Markers  No results for input(s): \"CRP\", \"CARY\", \"LDH\", \"DDIMER\" in the last 168 hours.    Imaging: Imaging data reviewed in Epic.    Medications: Scheduled Medications[1]    Assessment & Plan:   ASSESSMENT / PLAN:       Coronary artery disease status post CABG x 3 on April 23  Blood pressure stable  On nitroglycerin and dobutamine-discontinue  Cardiothoracic surgery and cardiology following  Continue aspirin, Plavix, Lipitor, Metoprolol   Discharge planning per CV,  plans home with home health once improved.    Hypertension  Continue metoprolol     Hyperlipidemia  Continue Zetia and statins        Quality:  DVT Prophylaxis: Lovenox  CODE status:  FULL  DISPO: pending clinical improvement.   Estimated date of discharge: To be determined  Discharge is dependent on: Improved clinical status  At this point Patient is expected to be discharge to: Home versus rehab      Plan of care discussed with Patient and RN.     Coordinated care with providers and counseling re: treatment plan and workup     Mady Hamlin MD    Supplementary Documentation:         **Certification      PHYSICIAN Certification of Need for Inpatient Hospitalization - Initial Certification    Patient will require inpatient services that will reasonably be expected to span two midnight's based on the clinical documentation in H+P.   Based on patients current state of illness, I anticipate that, after discharge, patient will require TBD.             I personally reviewed the available laboratories, imaging including operative report. I discussed/will discuss the case with patient and her nurse. I ordered laboratories studies. I adjusted medications including not applicable today. Medical decision making high, risk is high.     >55min spent, >50% spent counseling and coordinating care in the form of educating  pt/family and d/w consultants and staff. Most of the time spent discussing the above plan.                 [1]    ferrous sulfate  325 mg Oral BID with meals    furosemide  20 mg Intravenous BID (Diuretic)    potassium chloride  20 mEq Oral Daily    ezetimibe  10 mg Oral Nightly    atorvastatin  80 mg Oral Nightly    sennosides  8.6 mg Oral BID    docusate sodium  100 mg Oral BID    metoclopramide  10 mg Intravenous Q6H    famotidine  20 mg Oral BID    metoprolol tartrate  25 mg Oral 2x Daily(Beta Blocker)    mupirocin  1 Application Nasal BID    chlorhexidine gluconate  15 mL Mouth/Throat BID    multivitamin  1 tablet Oral Daily    ascorbic acid  500 mg Oral TID    enoxaparin  40 mg Subcutaneous Daily    clopidogrel  75 mg Oral Daily    aspirin  81 mg Oral Daily

## 2025-04-26 NOTE — PROGRESS NOTES
Daily Pulmonary/ICU/Critical Care Progress Note          SUBJECTIVE:  Sitting a chair  On RA and afebrile  Reports he feels a littlle better      ALLERGIES:  Allergies[1]      MEDS:  Home Medications:  Medications Taking[2]  Scheduled Medication:  Scheduled Medications[3]  Continuous Infusing Medication:  Medication Infusions[4]  PRN Medications:  PRN Medications[5]       PHYSICAL EXAM:  /84 (BP Location: Right arm)   Pulse 91   Temp 98.1 °F (36.7 °C) (Temporal)   Resp 13   Ht 5' 9.02\" (1.753 m)   Wt 156 lb 15.5 oz (71.2 kg)   SpO2 95%   BMI 23.17 kg/m²      CONSTITUTIONAL: alert, oriented, no apparent distress  HEENT: atraumatic normocephalic  MOUTH: mucous membranes are moist. No OP exudates  NECK/THROAT: no JVD. Trachea midline. No obvious thyromegaly  LUNG: clear upper b/l no wheezing, crackles. Diminished bases. Chest symmetric with respiratory motion  HEART: regular rate and rhythm, no obvious murmers or gallops noted  ABD: soft non tender. + bowel sounds. No organomegaly noted  EXT: no clubbing, cyanosis. Trace LE edema noted  NEURO/MUSCULOSKELETAL: no gross deficits  SKIN: warm, dry. No obvious lesions noted  LYMPH: no obvious LAD      IMAGES:   Reviewed in EMR  CXR 4/25/25  Unchanged mild basilar opacities likely atelectasis and effusion; persistent small vague opacities in the mid to lower lungs likely foci of atelectasis         LABS:  Recent Labs   Lab 04/21/25  0743 04/23/25  1157 04/24/25  0538 04/26/25  0400   RBC 4.89 3.82 3.30* 3.68*   HGB 14.4 11.6* 9.9* 11.2*   HCT 45.2 34.4* 29.7* 33.8*   MCV 92.4 90.1 90.0 91.8   MCH 29.4 30.4 30.0 30.4   MCHC 31.9 33.7 33.3 33.1   RDW 13.5 13.6 13.9 14.4   NEPRELIM 2.54  --  10.41* 9.59*   WBC 5.9 16.4* 11.8* 13.4*   .0* 172.0 129.0* 111.0*       Recent Labs   Lab 04/21/25  0743 04/23/25  1157 04/23/25  1609 04/23/25  2333 04/24/25  0538 04/26/25  0400   *   < > 136*  --  139* 110*   BUN 13   < > 13  --  12 26*   CREATSERUM 1.03   < >  1.13  --  1.04 1.10   EGFRCR 75   < > 67  --  74 70   CA 9.9   < > 9.1  --  9.3 9.6   ALB 4.4  --   --   --   --   --       < > 144  --  144 141   K 4.0   < > 3.7 4.2 4.1 3.9      < > 110  --  110 104   CO2 27.0   < > 25.0  --  25.0 31.0   ALKPHO 66  --   --   --   --   --    AST 27  --   --   --   --   --    ALT 30  --   --   --   --   --    BILT 0.9  --   --   --   --   --    TP 6.8  --   --   --   --   --     < > = values in this interval not displayed.         ASSESSMENT/PLAN:  S/p CABG x 3 for CAD  -extubated   -pain control  -push IS  -chest tubes and swan removed as per CV  -not on cardiac gtts  -on asa, statin, plavix, bb    HTN  -bb    HLP  -statin and zetia    Proph  -DVT: lovenox    Dispo  -full code      Thank you for the opportunity to care for CarolinaJessica Quiñones DO, MPH  Pulmonary Critical Care Medicine  [facility]          [1] No Known Allergies  [2]   Outpatient Medications Marked as Taking for the 4/23/25 encounter (Hospital Encounter)   Medication Sig Dispense Refill    lisinopril 10 MG Oral Tab Take 1 tablet (10 mg total) by mouth at bedtime.      atorvastatin 20 MG Oral Tab Take 1 tablet (20 mg total) by mouth daily. (Patient taking differently: Take 1 tablet (20 mg total) by mouth nightly.) 90 tablet 3    ezetimibe 10 MG Oral Tab Take 1 tablet (10 mg total) by mouth daily. (Patient taking differently: Take 1 tablet (10 mg total) by mouth nightly.) 90 tablet 3    montelukast 10 MG Oral Tab Take 1 tablet (10 mg total) by mouth nightly. 90 tablet 3    aspirin 81 MG Oral Tab EC Take 1 tablet (81 mg total) by mouth at bedtime.      Calcium Carbonate-Vitamin D (CALTRATE 600+D OR) Take by mouth nightly.      Probiotic Product (PROBIOTIC DAILY OR) Take by mouth at bedtime.     [3]    ferrous sulfate  325 mg Oral BID with meals    furosemide  20 mg Intravenous BID (Diuretic)    potassium chloride  20 mEq Oral Daily    ezetimibe  10  mg Oral Nightly    atorvastatin  80 mg Oral Nightly    sennosides  8.6 mg Oral BID    docusate sodium  100 mg Oral BID    metoclopramide  10 mg Intravenous Q6H    famotidine  20 mg Oral BID    metoprolol tartrate  25 mg Oral 2x Daily(Beta Blocker)    mupirocin  1 Application Nasal BID    chlorhexidine gluconate  15 mL Mouth/Throat BID    multivitamin  1 tablet Oral Daily    ascorbic acid  500 mg Oral TID    enoxaparin  40 mg Subcutaneous Daily    clopidogrel  75 mg Oral Daily    aspirin  81 mg Oral Daily   [4] [5]   melatonin    polyethylene glycol (PEG 3350)    bisacodyl    ondansetron    potassium chloride **OR** potassium chloride    magnesium sulfate in dextrose 5%    magnesium sulfate in sterile water for injection    acetaminophen **OR** HYDROcodone-acetaminophen **OR** HYDROcodone-acetaminophen    morphINE **OR** [DISCONTINUED] morphINE    glucose **OR** glucose **OR** glucose-vitamin C **OR** dextrose **OR** glucose **OR** glucose **OR** glucose-vitamin C

## 2025-04-26 NOTE — PLAN OF CARE
Problem: Patient Centered Care  Goal: Patient preferences are identified and integrated in the patient's plan of care  Description: Interventions:- What would you like us to know as we care for you?  I have two daughters- Provide timely, complete, and accurate information to patient/family- Incorporate patient and family knowledge, values, beliefs, and cultural backgrounds into the planning and delivery of care- Encourage patient/family to participate in care and decision-making at the level they choose- Honor patient and family perspectives and choices  Outcome: Progressing     Problem: Diabetes/Glucose Control  Goal: Glucose maintained within prescribed range  Description: INTERVENTIONS:- Monitor Blood Glucose as ordered- Assess for signs and symptoms of hyperglycemia and hypoglycemia- Administer ordered medications to maintain glucose within target range- Assess barriers to adequate nutritional intake and initiate nutrition consult as needed- Instruct patient on self management of diabetes  Outcome: Progressing     Problem: Patient/Family Goals  Goal: Patient/Family Long Term Goal  Description: Patient's Long Term Goal: Interventions:- See additional Care Plan goals for specific interventions  Outcome: Progressing  Goal: Patient/Family Short Term Goal  Description: Patient's Short Term Goal: Interventions: - See additional Care Plan goals for specific interventions  Outcome: Progressing     Problem: CARDIOVASCULAR - ADULT  Goal: Maintains optimal cardiac output and hemodynamic stability  Description: INTERVENTIONS:- Monitor vital signs, rhythm, and trends- Monitor for bleeding, hypotension and signs of decreased cardiac output- Evaluate effectiveness of vasoactive medications to optimize hemodynamic stability- Monitor arterial and/or venous puncture sites for bleeding and/or hematoma- Assess quality of pulses, skin color and temperature- Assess for signs of decreased coronary artery perfusion - ex. Angina-  Evaluate fluid balance, assess for edema, trend weights  Outcome: Progressing  Goal: Absence of cardiac arrhythmias or at baseline  Description: INTERVENTIONS:- Continuous cardiac monitoring, monitor vital signs, obtain 12 lead EKG if indicated- Evaluate effectiveness of antiarrhythmic and heart rate control medications as ordered- Initiate emergency measures for life threatening arrhythmias- Monitor electrolytes and administer replacement therapy as ordered  Outcome: Progressing     Problem: RESPIRATORY - ADULT  Goal: Achieves optimal ventilation and oxygenation  Description: INTERVENTIONS:- Assess for changes in respiratory status- Assess for changes in mentation and behavior- Position to facilitate oxygenation and minimize respiratory effort- Oxygen supplementation based on oxygen saturation or ABGs- Provide Smoking Cessation handout, if applicable- Encourage broncho-pulmonary hygiene including cough, deep breathe, Incentive Spirometry- Assess the need for suctioning and perform as needed- Assess and instruct to report SOB or any respiratory difficulty- Respiratory Therapy support as indicated- Manage/alleviate anxiety- Monitor for signs/symptoms of CO2 retention  Outcome: Progressing     Problem: Integumentary status not within defined limits  Goal: Pt's integumentary status will be adequate for discharge  Outcome: Progressing     Problem: PAIN - ADULT  Goal: Verbalizes/displays adequate comfort level or patient's stated pain goal  Description: INTERVENTIONS:- Encourage pt to monitor pain and request assistance- Assess pain using appropriate pain scale- Administer analgesics based on type and severity of pain and evaluate response- Implement non-pharmacological measures as appropriate and evaluate response- Consider cultural and social influences on pain and pain management- Manage/alleviate anxiety- Utilize distraction and/or relaxation techniques- Monitor for opioid side effects- Notify MD/LIP if interventions  unsuccessful or patient reports new pain- Anticipate increased pain with activity and pre-medicate as appropriate  Outcome: Progressing

## 2025-04-26 NOTE — PROGRESS NOTES
Critical Care    Called to evaluate new subcutaneous emphysema    Awake alert, reports neck feels tight when coughing, mild dyspnea    /65 (BP Location: Right arm)   Pulse 78   Temp 98.6 °F (37 °C) (Temporal)   Resp 15   Ht 175.3 cm (5' 9.02\")   Wt 156 lb 15.5 oz (71.2 kg)   SpO2 93%   BMI 23.17 kg/m²    Sa02 92-93% on room air    HEENT no stridor  CV S1 S2 regular w/ ?rub  Lung bibasilar crackles   Abd soft nontender  Ext no LE edema  Palpable subcutaneous emphysema anterior chest neck, new    A/P CAD s/ CABG POD #3.  Chest tubes were pulled today, new subcutaneous air chest and neck.  No stridor, 02 sat 93% on room air.    Stat portable CXR now.  I will review w/ nocturnist, RN will  speak w/ CV surgery.  D/w patient RN and family at bedside.      hSanti Pham, NP  Critical Care    Reviewed CXR w/ Dr. Wen beaulieuQ air, no pneumothorax.

## 2025-04-26 NOTE — PROGRESS NOTES
[facility]  Cardiology Progress Note    Carolina Montanez Patient Status:  Inpatient    1950 MRN [id number]   Location [facility] 2W/ Attending Mady Hamlin MD   Hosp Day # 3 PCP Logan Hernandez MD     Subjective     No issues overnight.  Continues to complain of postoperative chest discomfort.    Objective:   Patient Vitals for the past 24 hrs:   BP Temp Temp src Pulse Resp SpO2 Weight   25 0530 125/84 -- -- 91 13 95 % --   25 0500 -- -- -- -- -- -- 156 lb 15.5 oz (71.2 kg)   25 0400 -- 98 °F (36.7 °C) Temporal 77 10 95 % --   25 0200 112/67 -- -- 78 12 95 % --   25 0000 -- 97.6 °F (36.4 °C) Temporal -- -- -- --   25 2100 112/70 -- -- 75 12 96 % --   25 2000 106/67 98.6 °F (37 °C) Temporal 86 19 95 % --   25 1800 (!) 120/106 -- -- 100 20 94 % --   25 1600 139/74 98.1 °F (36.7 °C) Temporal 82 15 96 % --   25 1400 135/88 -- -- 80 15 96 % --   25 1200 115/78 97.5 °F (36.4 °C) Temporal 61 10 95 % --   25 1000 122/85 -- -- 63 11 94 % --   25 0930 136/80 -- -- -- 10 -- --   25 0925 136/80 -- -- 73 -- -- --   25 0800 128/79 97.4 °F (36.3 °C) Temporal 66 12 97 % --   25 0700 123/70 -- -- 70 17 96 % --     Intake/Output:   Last 3 shifts:   Intake/Output                   25 0700 - 25 0659 25 0700 - 25 0659 25 0700 - 25 0659       Intake    P.O.  1140  120  --    P.O. 1140 120 --    I.V.  86748.1  --  --    I.V. 56063 -- --    Volume (mL) Insulin 63 -- --    Volume (mL) Dobutamine 80 -- --    Volume (mL) (sodium chloride 0.9% infusion) 219.8 -- --    Volume (mL) (dextrose 5%-sodium chloride 0.45% infusion) 39.3 -- --    Total Intake 84127.1 120 --       Output    Urine  650  1100  --    Urine -- 400 --    Urine Occurrence -- 7 x --    Output (mL) ([REMOVED] Urethral Catheter Latex;Double-lumen;Temperature probe) 650 700 --    Chest Tube  440  280  --    Output  (mL) (Y Chest Tube 1 and 2 Anterior Mediastinal 32 Fr. Anterior Pleural 32 Fr.) 440 280 --    Total Output 1090 1380 --       Net I/O     51564.1 -1260 --             Scheduled Meds: Scheduled Medications[1]    Continuous Infusions: Medication Infusions[2]    Results:     Lab Results   Component Value Date    WBC 13.4 (H) 04/26/2025    HGB 11.2 (L) 04/26/2025    HCT 33.8 (L) 04/26/2025    .0 (L) 04/26/2025    CREATSERUM 1.10 04/26/2025    BUN 26 (H) 04/26/2025     04/26/2025    K 3.9 04/26/2025     04/26/2025    CO2 31.0 04/26/2025     (H) 04/26/2025    CA 9.6 04/26/2025    ALB 4.4 04/21/2025    ALKPHO 66 04/21/2025    BILT 0.9 04/21/2025    TP 6.8 04/21/2025    AST 27 04/21/2025    ALT 30 04/21/2025    PTT 30.4 04/23/2025    INR 1.45 (H) 04/23/2025    T4F 1.0 08/01/2022    TSH 1.926 11/21/2023    PSA 0.68 06/07/2023    ESRML 18 04/04/2023    CRP 0.31 (H) 04/04/2023    MG 2.3 04/26/2025    B12 1,098 (H) 11/21/2023       Recent Labs   Lab 04/23/25  1609 04/23/25  2333 04/24/25  0538 04/26/25  0400   *  --  139* 110*   BUN 13  --  12 26*   CREATSERUM 1.13  --  1.04 1.10   CA 9.1  --  9.3 9.6     --  144 141   K 3.7 4.2 4.1 3.9     --  110 104   CO2 25.0  --  25.0 31.0     Recent Labs   Lab 04/21/25  0743 04/23/25  1157 04/24/25  0538 04/26/25  0400   RBC 4.89 3.82 3.30* 3.68*   HGB 14.4 11.6* 9.9* 11.2*   HCT 45.2 34.4* 29.7* 33.8*   MCV 92.4 90.1 90.0 91.8   MCH 29.4 30.4 30.0 30.4   MCHC 31.9 33.7 33.3 33.1   RDW 13.5 13.6 13.9 14.4   NEPRELIM 2.54  --  10.41* 9.59*   WBC 5.9 16.4* 11.8* 13.4*   .0* 172.0 129.0* 111.0*       No results for input(s): \"BNPML\" in the last 168 hours.    No results for input(s): \"TROP\", \"CK\" in the last 168 hours.    XR CHEST AP PORTABLE  (CPT=71045)  Result Date: 4/24/2025  CONCLUSION:   Lines and tubes in place as described.  Mild interstitial edema.     Dictated by (CST): Cash Patel MD on 4/24/2025 at 8:18 AM     Finalized by  (CST): Cash Patel MD on 4/24/2025 at 8:21 AM            Exam:     Physical Exam:  General: Alert and oriented x 3. No apparent distress.   HEENT: Normocephalic, anicteric sclera, neck supple, no thyromegaly or adenopathy.  Neck: No JVD, carotids 2+, no bruits.  Cardiac: Regular rate and rhythm. S1, S2 normal. No murmur, pericardial rub, S3, or extra cardiac sounds.  Lungs: Clear without wheezes, rales, rhonchi or dullness.  Normal excursions and effort.  Abdomen: Soft, non-tender. No organosplenomegally, mass or rebound, BS-present.  Extremities: Without clubbing or cyanosis. No edema.    Neurologic: Alert and oriented, normal affect. No focal defects  Skin: Warm and dry.     Assessment and Plan:   Assessment:     1.  CAD.  Status post CABG x 3 4/23/2025.  LIMA to LAD, SVG to OM, SVG to PDA.  POD #3.     --Stable blood pressure and rhythm.  Stable hemodynamics.  -- Weaned off nitroglycerin and dobutamine.     EKG: AV paced  Chest x-ray clear  Labs unremarkable.  Mild LV dysfunction preop.     Plan:     Routine postop care.    Stephane Foster MD  [facility]  4/26/2025             [1]    ferrous sulfate  325 mg Oral BID with meals    furosemide  20 mg Intravenous BID (Diuretic)    potassium chloride  20 mEq Oral Daily    ezetimibe  10 mg Oral Nightly    atorvastatin  80 mg Oral Nightly    insulin aspart  1-5 Units Subcutaneous TID CC    sennosides  8.6 mg Oral BID    docusate sodium  100 mg Oral BID    metoclopramide  10 mg Intravenous Q6H    famotidine  20 mg Oral BID    metoprolol tartrate  25 mg Oral 2x Daily(Beta Blocker)    mupirocin  1 Application Nasal BID    chlorhexidine gluconate  15 mL Mouth/Throat BID    multivitamin  1 tablet Oral Daily    ascorbic acid  500 mg Oral TID    enoxaparin  40 mg Subcutaneous Daily    clopidogrel  75 mg Oral Daily    aspirin  81 mg Oral Daily   [2]

## 2025-04-26 NOTE — PROGRESS NOTES
[facility]  part of [facility]    Progress Note    Carolina Montanez Patient Status:  Inpatient    1950 MRN [id number]   Location [facility] 2W/SW Attending Eulalio Hernandez MD   Hosp Day # 3 PCP Logan Hernandez MD     Subjective:  OOB in chair on exam, no acute events noted overnight.  Still with mild incisional pain, mainly from chest tube site.  Currently denies: CP, dizziness, palpitations, or SOB.  No visitors at bedside currently.  Weak/fatigued    Objective:  /84 (BP Location: Right arm)   Pulse 91   Temp 98 °F (36.7 °C) (Temporal)   Resp 13   Ht 5' 9.02\" (1.753 m)   Wt 156 lb 15.5 oz (71.2 kg)   SpO2 95%   BMI 23.17 kg/m²     Temp (24hrs), Av.9 °F (36.6 °C), Min:97.4 °F (36.3 °C), Max:98.6 °F (37 °C)  Telemetry-paced    Intake/Output:    Intake/Output Summary (Last 24 hours) at 2025 0750  Last data filed at 2025 0548  Gross per 24 hour   Intake 120 ml   Output 1380 ml   Net -1260 ml       Wt Readings from Last 3 Encounters:   25 156 lb 15.5 oz (71.2 kg)   25 159 lb 9.6 oz (72.4 kg)   25 159 lb (72.1 kg)       Allergies:  Allergies[1]    Labs:  Lab Results   Component Value Date    WBC 13.4 2025    HGB 11.2 2025    HCT 33.8 2025    .0 2025    CREATSERUM 1.10 2025    BUN 26 2025     2025    K 3.9 2025     2025    CO2 31.0 2025     2025    CA 9.6 2025    MG 2.3 2025         Physical Exam:  Blood pressure 125/84, pulse 91, temperature 98 °F (36.7 °C), temperature source Temporal, resp. rate 13, height 5' 9.02\" (1.753 m), weight 156 lb 15.5 oz (71.2 kg), SpO2 95%.  General: NAD  Neck: No JVD  Lungs: Diminished LLL, otherwise CTA  Chest tubes= 60cc/12 hours- no air leak   Heart: RRR, S1, S2  Abdomen: Soft/Round, NT/ND, BS+x4 diminished/+Flatus/no BM   Extremities: Warm, dry, no LE edema bilat  Pulses: 1+ bilat DP  Skin: sternotomy incision  drsg: CDI w/TPW intact/L SVG site dressing CDI  Neurological:  AAOx3, MAEW    Assessment/Plan:  S/P CABG x 3 POD # 3  Encourage pulm toilet: IS, EZ Pap.  Stable on RA.  CXR reviewed.   Pain meds as needed -wean off narcotics as tolerated  Remains hemodynamically stable off vasoactive medications-DC chest tubes today, may shower tomorrow, as all lines will be removed  Increase activity-OOB to chair and increase ambulation in hallway 4 times daily  Scds/Lovenox prophylaxis DVT prevention   Expected post op volume overload: Mild on exam, continue Lasix,monitor BMP, daily weight, I's/O  Expected post op anemia w/o clinical significance/stable-Hgb 11.2, continue iron for 1 month postop  IV Insulin protocol w/no hx: DM-can DC Accu-Cheks and sliding scale  Hx: Thyroid nodule which has increased since 2022 as noted on his pre op carotid ultrasound. Pt aware. Recommend further discussion with his PCP during his follow up appt     Discharge planning: pt lives with his wife.  Plan for home with HH once medically stable, likely 24-48 hours.  Continue postop CV DC education, follow-up appointments as directed.  Appreciate CM/SW to assist with DC planning    Plan of care discussed w/patient/RN, and rounding CV surgeon Dr. Jena Fuentes, APRN  4/26/25  0 750       [1] No Known Allergies

## 2025-04-26 NOTE — PHYSICAL THERAPY NOTE
PHYSICAL THERAPY TREATMENT NOTE - INPATIENT     Room Number: 229/229-A       Presenting Problem: CAD, s/p CABG x 3       Problem List  Active Problems:    Preop testing    CAD (coronary artery disease)    Hx of CABG      PHYSICAL THERAPY ASSESSMENT   Patient demonstrates good  progress this session, goals  remain in progress.      Patient is requiring contact guard assist and minimal assist as a result of the following impairments: decreased functional strength, pain, impaired motor planning, decreased muscular endurance, and medical status.     Patient continues to function below baseline with transfers, gait, and maintaining seated position.  Next session anticipate patient to progress bed mobility, transfers, and gait.  Physical Therapy will continue to follow patient for duration of hospitalization.    Patient continues to benefit from continued skilled PT services: at discharge to promote prior level of function and safety with additional support and return home with home health PT.    PLAN DURING HOSPITALIZATION  Nursing Mobility Recommendation : 1 Assist  PT Device Recommendation: Rolling walker  PT Treatment Plan: Bed mobility, Body mechanics, Patient education, Gait training, Strengthening, Transfer training, Balance training  Frequency (Obs): Daily     SUBJECTIVE  I am ready     OBJECTIVE  Precautions: Sternal, Chest tube to suction    WEIGHT BEARING RESTRICTION       PAIN ASSESSMENT   Rating: Unable to rate  Location: incison sites  Management Techniques: Activity promotion, Body mechanics, Breathing techniques, Repositioning    BALANCE  Static Sitting: Good  Dynamic Sitting: Fair +  Static Standing: Fair  Dynamic Standing: Fair -    ACTIVITY TOLERANCE  Pulse: 86        BP: 116/75  BP Location: Right arm  BP Method: Automatic  Patient Position: Sitting     O2 WALK  Oxygen Therapy  SPO2% on Oxygen at Rest: 97  SPO2% Ambulation on Room Air: 93    AM-PAC '6-Clicks' INPATIENT SHORT FORM - BASIC MOBILITY  How  much difficulty does the patient currently have...  Patient Difficulty: Turning over in bed (including adjusting bedclothes, sheets and blankets)?: A Little   Patient Difficulty: Sitting down on and standing up from a chair with arms (e.g., wheelchair, bedside commode, etc.): A Little   Patient Difficulty: Moving from lying on back to sitting on the side of the bed?: A Little   How much help from another person does the patient currently need...   Help from Another: Moving to and from a bed to a chair (including a wheelchair)?: A Little   Help from Another: Need to walk in hospital room?: A Little   Help from Another: Climbing 3-5 steps with a railing?: A Little     AM-PAC Score:  Raw Score: 18   Approx Degree of Impairment: 46.58%   Standardized Score (AM-PAC Scale): 43.63   CMS Modifier (G-Code): CK    FUNCTIONAL ABILITY STATUS  Functional Mobility/Gait Assessment  Gait Assistance: Contact guard assist  Distance (ft): 300ft  Assistive Device: Rolling walker  Pattern: Shuffle  Stairs: Other (comment)  Rolling:  NT  Supine to Sit:  NT  Sit to Supine:  NT  Sit to Stand: minimal assist    Skilled Therapy Provided: Rn approved PT session, pt sitting in chair with b feet elevated. Instructed pt on gentle ble's exs to promote functional ability. Reviewed with pt sternal precautions, pt able to recall them and maintain with activity.  Sit to stand with min A with using RW. Pt amb outside of room with good gait pattern demo. Pt needs occ cues for maintain upright posture.  Pt position in chair, all needs in reach and pt is awating for lunch.    The patient's Approx Degree of Impairment: 46.58% has been calculated based on documentation in the [facility] '6 clicks' Inpatient Daily Activity Short Form.  Research supports that patients with this level of impairment may benefit from  PT.  Final disposition will be made by interdisciplinary medical team.    THERAPEUTIC EXERCISES  Lower Extremity Alternating marching  Ankle  pumps  Knee extension     Position Sitting       Patient End of Session: Up in chair, Needs met, Call light within reach, RN aware of session/findings, All patient questions and concerns addressed    CURRENT GOALS     Goals to be met by: 25  Patient Goal Patient's self-stated goal is: none stated   Goal #1 Patient is able to demonstrate supine - sit EOB @ level: supervision     Goal #1   Current Status NT today   Goal #2 Patient is able to demonstrate transfers Sit to/from Stand at assistance level: supervision with walker - rolling     Goal #2  Current Status Min A   Goal #3 Patient is able to ambulate 300 feet with assist device: walker - rolling at assistance level: supervision   Goal #3   Current Status CGA with RW 300ft   Goal #4 Patient will negotiate 12 stairs/one curb w/ assistive device and supervision   Goal #4   Current Status NT   Goal #5 Patient to demonstrate independence with home activity/exercise instructions provided to patient in preparation for discharge.   Goal #5   Current Status In progress       Gait Trainin minutes  Therapeutic Activity: 12 minutes

## 2025-04-27 NOTE — PLAN OF CARE
Problem: Patient Centered Care  Goal: Patient preferences are identified and integrated in the patient's plan of care  Description: Interventions:- What would you like us to know as we care for you?  I have two daughters- Provide timely, complete, and accurate information to patient/family- Incorporate patient and family knowledge, values, beliefs, and cultural backgrounds into the planning and delivery of care- Encourage patient/family to participate in care and decision-making at the level they choose- Honor patient and family perspectives and choices  Outcome: Progressing     Problem: Diabetes/Glucose Control  Goal: Glucose maintained within prescribed range  Description: INTERVENTIONS:- Monitor Blood Glucose as ordered- Assess for signs and symptoms of hyperglycemia and hypoglycemia- Administer ordered medications to maintain glucose within target range- Assess barriers to adequate nutritional intake and initiate nutrition consult as needed- Instruct patient on self management of diabetes  Outcome: Progressing     Problem: CARDIOVASCULAR - ADULT  Goal: Maintains optimal cardiac output and hemodynamic stability  Description: INTERVENTIONS:- Monitor vital signs, rhythm, and trends- Monitor for bleeding, hypotension and signs of decreased cardiac output- Evaluate effectiveness of vasoactive medications to optimize hemodynamic stability- Monitor arterial and/or venous puncture sites for bleeding and/or hematoma- Assess quality of pulses, skin color and temperature- Assess for signs of decreased coronary artery perfusion - ex. Angina- Evaluate fluid balance, assess for edema, trend weights  Outcome: Progressing  Goal: Absence of cardiac arrhythmias or at baseline  Description: INTERVENTIONS:- Continuous cardiac monitoring, monitor vital signs, obtain 12 lead EKG if indicated- Evaluate effectiveness of antiarrhythmic and heart rate control medications as ordered- Initiate emergency measures for life threatening  arrhythmias- Monitor electrolytes and administer replacement therapy as ordered  Outcome: Progressing     Problem: RESPIRATORY - ADULT  Goal: Achieves optimal ventilation and oxygenation  Description: INTERVENTIONS:- Assess for changes in respiratory status- Assess for changes in mentation and behavior- Position to facilitate oxygenation and minimize respiratory effort- Oxygen supplementation based on oxygen saturation or ABGs- Provide Smoking Cessation handout, if applicable- Encourage broncho-pulmonary hygiene including cough, deep breathe, Incentive Spirometry- Assess the need for suctioning and perform as needed- Assess and instruct to report SOB or any respiratory difficulty- Respiratory Therapy support as indicated- Manage/alleviate anxiety- Monitor for signs/symptoms of CO2 retention  Outcome: Progressing     Problem: Integumentary status not within defined limits  Goal: Pt's integumentary status will be adequate for discharge  Outcome: Progressing     Problem: PAIN - ADULT  Goal: Verbalizes/displays adequate comfort level or patient's stated pain goal  Description: INTERVENTIONS:- Encourage pt to monitor pain and request assistance- Assess pain using appropriate pain scale- Administer analgesics based on type and severity of pain and evaluate response- Implement non-pharmacological measures as appropriate and evaluate response- Consider cultural and social influences on pain and pain management- Manage/alleviate anxiety- Utilize distraction and/or relaxation techniques- Monitor for opioid side effects- Notify MD/LIP if interventions unsuccessful or patient reports new pain- Anticipate increased pain with activity and pre-medicate as appropriate  Outcome: Progressing

## 2025-04-27 NOTE — PROGRESS NOTES
On call Angel 04/26/25    Called by RN to evaluate patient in room 229.  Status post CABG recent removal of chest tube now complaining of some difficulty breathing and swelling in the neck and upper chest area.  Currently saturating 100% on 2 L nasal cannula, denying any chest pain.    On exam  Temperature 98.6  Pulse 78  Respiration 16  /65  Pulse ox 100% on 2 L nasal cannula  Alert oriented  Chest difficult to auscultate with subcu emphysema  Heart regular rate  Abdomen soft  Extremities no edema  No focal neurologic deficit.    Assessment and plan    Left apical pneumothorax  Will continue to monitor clinically, currently on supplemental O2 will repeat chest x-ray later and treat accordingly.  Conservative management for now

## 2025-04-27 NOTE — PROGRESS NOTES
Daily Pulmonary/ICU/Critical Care Progress Note          SUBJECTIVE:  Complained on chest tightness and neck swelling/pain last night  Developed subcutaneous air last night. CXR with left apical PTX, subcutaneous air, and pneumomedistinum  Hemodynamically stable  Chest tube not placed  Sitting in chair. C/o difficulty breathing but appears comfortable      ALLERGIES:  Allergies[1]      MEDS:  Home Medications:  Medications Taking[2]  Scheduled Medication:  Scheduled Medications[3]  Continuous Infusing Medication:  Medication Infusions[4]  PRN Medications:  PRN Medications[5]       PHYSICAL EXAM:  BP 92/64 (BP Location: Right arm)   Pulse 101   Temp 100 °F (37.8 °C) (Temporal)   Resp 20   Ht 5' 9.02\" (1.753 m)   Wt 156 lb 15.5 oz (71.2 kg)   SpO2 100%   BMI 23.17 kg/m²      CONSTITUTIONAL: alert, oriented, no apparent distress  HEENT: atraumatic normocephalic  MOUTH: mucous membranes are moist. No OP exudates  NECK/THROAT: no JVD. Trachea midline. Neck region swollen with crepitus   LUNG: clear upper b/l no wheezing, crackles. Diminished bases. Chest symmetric with respiratory motion  HEART: regular rate and rhythm, no obvious murmers or gallops noted  ABD: soft non tender. + bowel sounds. No organomegaly noted  EXT: no clubbing, cyanosis. Trace LE edema noted  NEURO/MUSCULOSKELETAL: no gross deficits  SKIN: warm, dry. No obvious lesions noted  LYMPH: no obvious LAD      IMAGES:   Reviewed in EMR  CXR 4/26/25  CONCLUSION:   1. Left apical pneumothorax measuring between 2 and 3 cm from the apex.   2. Subcutaneous emphysema extending into the neck.  Mild pneumomediastinum.   3. Stable mild cardiomegaly.  Post coronary artery bypass.   4. Dual-chamber pacemaker.   5. Perihilar and basilar atelectasis.   6. Perfect serve message sent to PINO Ortega     CXR 4/25/25  Unchanged mild basilar opacities likely atelectasis and effusion; persistent small vague opacities in the mid to lower lungs likely foci of  atelectasis       LABS:  Recent Labs   Lab 04/21/25  0743 04/23/25  1157 04/24/25  0538 04/26/25  0400 04/27/25  0438   RBC 4.89   < > 3.30* 3.68* 3.99   HGB 14.4   < > 9.9* 11.2* 12.0*   HCT 45.2   < > 29.7* 33.8* 36.3*   MCV 92.4   < > 90.0 91.8 91.0   MCH 29.4   < > 30.0 30.4 30.1   MCHC 31.9   < > 33.3 33.1 33.1   RDW 13.5   < > 13.9 14.4 13.9   NEPRELIM 2.54  --  10.41* 9.59*  --    WBC 5.9   < > 11.8* 13.4* 11.3*   .0*   < > 129.0* 111.0* 131.0*    < > = values in this interval not displayed.       Recent Labs   Lab 04/21/25  0743 04/23/25  1157 04/24/25  0538 04/26/25  0400 04/27/25  0438   *   < > 139* 110* 106*   BUN 13   < > 12 26* 25*   CREATSERUM 1.03   < > 1.04 1.10 1.08   EGFRCR 75   < > 74 70 71   CA 9.9   < > 9.3 9.6 9.8   ALB 4.4  --   --   --   --       < > 144 141 141   K 4.0   < > 4.1 3.9 4.2      < > 110 104 102   CO2 27.0   < > 25.0 31.0 32.0   ALKPHO 66  --   --   --   --    AST 27  --   --   --   --    ALT 30  --   --   --   --    BILT 0.9  --   --   --   --    TP 6.8  --   --   --   --     < > = values in this interval not displayed.         ASSESSMENT/PLAN:  S/p CABG x 3 for CAD  -extubated   -pain control  -push IS  -chest tubes and swan removed on 4/26/25 as per CV  -not on cardiac gtts  -on asa, statin, plavix, bb    Left PTX, pneumomediastinum  -developed on evening of 4/26/25   -has subcutaneous air. Not obvious PTX on my review of CXR this am  -hemodynamically stable. So will continue to follow. Discussed with CV surgery this am    HTN  -bb    HLP  -statin and zetia    Proph  -DVT: lovenox    Dispo  -full code      Thank you for the opportunity to care for Carolina Quiñones DO, MPH  Pulmonary Critical Care Medicine  [facility]          [1] No Known Allergies  [2]   Outpatient Medications Marked as Taking for the 4/23/25 encounter (Hospital Encounter)   Medication Sig Dispense Refill    lisinopril 10  MG Oral Tab Take 1 tablet (10 mg total) by mouth at bedtime.      atorvastatin 20 MG Oral Tab Take 1 tablet (20 mg total) by mouth daily. (Patient taking differently: Take 1 tablet (20 mg total) by mouth nightly.) 90 tablet 3    ezetimibe 10 MG Oral Tab Take 1 tablet (10 mg total) by mouth daily. (Patient taking differently: Take 1 tablet (10 mg total) by mouth nightly.) 90 tablet 3    montelukast 10 MG Oral Tab Take 1 tablet (10 mg total) by mouth nightly. 90 tablet 3    aspirin 81 MG Oral Tab EC Take 1 tablet (81 mg total) by mouth at bedtime.      Calcium Carbonate-Vitamin D (CALTRATE 600+D OR) Take by mouth nightly.      Probiotic Product (PROBIOTIC DAILY OR) Take by mouth at bedtime.     [3]    ferrous sulfate  325 mg Oral BID with meals    furosemide  20 mg Intravenous BID (Diuretic)    potassium chloride  20 mEq Oral Daily    ezetimibe  10 mg Oral Nightly    atorvastatin  80 mg Oral Nightly    sennosides  8.6 mg Oral BID    docusate sodium  100 mg Oral BID    metoclopramide  10 mg Intravenous Q6H    famotidine  20 mg Oral BID    metoprolol tartrate  25 mg Oral 2x Daily(Beta Blocker)    mupirocin  1 Application Nasal BID    chlorhexidine gluconate  15 mL Mouth/Throat BID    multivitamin  1 tablet Oral Daily    ascorbic acid  500 mg Oral TID    enoxaparin  40 mg Subcutaneous Daily    clopidogrel  75 mg Oral Daily    aspirin  81 mg Oral Daily   [4] [5]   melatonin    polyethylene glycol (PEG 3350)    bisacodyl    ondansetron    potassium chloride **OR** potassium chloride    magnesium sulfate in dextrose 5%    magnesium sulfate in sterile water for injection    acetaminophen **OR** HYDROcodone-acetaminophen **OR** HYDROcodone-acetaminophen    morphINE **OR** [DISCONTINUED] morphINE    glucose **OR** glucose **OR** glucose-vitamin C **OR** dextrose **OR** glucose **OR** glucose **OR** glucose-vitamin C

## 2025-04-27 NOTE — PROGRESS NOTES
[facility]  part of [facility]    Progress Note    Carolina Montanez Patient Status:  Inpatient    1950 MRN [id number]   Location [facility] 2W/SW Attending Eulalio Hernandez MD   Hosp Day # 4 PCP Logan Hernandez MD     Subjective:  OOB in chair on exam, last night developed subq emphysema upper chest/neck post chest tube removal, stat CXR with left apical PTX 2 to 3 cm, asymptomatic.  Repeat CXR this a.m. with decreased PTX, remains asymptomatic, stable on RA.  No dysphagia or difficulty swallowing.  He currently denies: SOB, CP, palpitations, or dizziness.  Overall just generally feels fatigued and weak.    Objective:  BP 92/64 (BP Location: Right arm)   Pulse 101   Temp 97.9 °F (36.6 °C) (Temporal)   Resp 20   Ht 5' 9.02\" (1.753 m)   Wt 150 lb 9.2 oz (68.3 kg)   SpO2 100%   BMI 22.23 kg/m²     Temp (24hrs), Av.8 °F (37.1 °C), Min:97.9 °F (36.6 °C), Max:100 °F (37.8 °C)  Telemetry-paced    Intake/Output:    Intake/Output Summary (Last 24 hours) at 2025 1012  Last data filed at 2025 0658  Gross per 24 hour   Intake 200 ml   Output 1350 ml   Net -1150 ml       Wt Readings from Last 3 Encounters:   25 150 lb 9.2 oz (68.3 kg)   25 159 lb 9.6 oz (72.4 kg)   25 159 lb (72.1 kg)       Allergies:  Allergies[1]    Labs:  Lab Results   Component Value Date    WBC 11.3 2025    HGB 12.0 2025    HCT 36.3 2025    .0 2025    CREATSERUM 1.08 2025    BUN 25 2025     2025    K 4.2 2025     2025    CO2 32.0 2025     2025    CA 9.8 2025    ESRML 49 2025         Physical Exam:  Blood pressure 92/64, pulse 101, temperature 97.9 °F (36.6 °C), temperature source Temporal, resp. rate 20, height 5' 9.02\" (1.753 m), weight 150 lb 9.2 oz (68.3 kg), SpO2 100%.  General: NAD  Neck: Crepitus  Lungs: Decreased bases  Heart: RRR, S1, S2  Abdomen: Soft/Round, NT/ND, BS+x4  diminished/+Flatus/+BM   Extremities: Warm, dry, no LE edema bilat  Pulses: 1+ bilat DP  Skin: sternotomy incision drsg: CDI w/TPW intact/L SVG site CDI  Neurological:  AAOx3, MAEW    Assessment/Plan:  S/P CABG x 3 POD # 4  Encourage pulm toilet: IS, DC EZ Pap with PTX.  Stable on RA.  CXR reviewed.  PA/LAT CXR in AM.  Reviewed CXR with Dr. Bella/Ishmael -as PTX is decreased slightly, and patient is asymptomatic, will continue to monitor.  If worsens or becomes symptomatic, will need pigtail or CT.  Pain meds as needed -minimize narcotics as able due to advanced age  Remains hemodynamically stable off vasoactive medications-all lines have been removed, patient may shower today  Increase activity-OOB to chair and increase ambulation -patient weak, lacks motivation, needs encouragement for walking.   Scds/Lovenox prophylaxis DVT prevention   Expected post op volume overload: Mild on exam, continue Lasix, decrease to daily as patient very weak, at dry weight.  Either DC or transition to p.o. tomorrow.  Monitor BMP, daily weight, I's/O  ESR 49 - suspect PPS, pt weak/chest tenderness, start colchicine 0.6QD x 14 days  Expected post op anemia w/o clinical significance/stable-Hgb stable 12.  IV Insulin protocol w/no hx: DM-can DC Accu-Cheks and sliding scale  Hx: Thyroid nodule which has increased since 2022 as noted on his pre op carotid ultrasound. Pt aware. Recommend further discussion with his PCP during his follow up appt     Discharge planning: pt lives with his wife.  Plan for home with HH once medically stable, likely 24-48 hours.  Continue postop CV DC education, follow-up appointments as directed.  Appreciate CM/SW to assist with DC planning    Plan of care discussed w/patient/RN, and rounding CV surgeon Dr. Jena Fuentes, APRN  4/27/2025  1015         [1] No Known Allergies

## 2025-04-27 NOTE — PROGRESS NOTES
[facility]  Cardiology Progress Note    Carolina Montanez Patient Status:  Inpatient    1950 MRN [id number]   Location [facility] 2W/SW Attending Mady Hamlin MD   Hosp Day # 4 PCP Logan Hernandez MD     Subjective     After chest tube removal yesterday noted to have left-sided apical pneumothorax and subcutaneous emphysema.  Complains of neck and chest discomfort.    Objective:   Patient Vitals for the past 24 hrs:   BP Temp Temp src Pulse Resp SpO2   25 0400 92/64 -- -- 101 20 100 %   25 0200 114/77 -- -- 98 18 95 %   25 0000 93/54 -- -- 76 17 94 %   25 2200 108/48 -- -- 82 15 99 %   25 99/63 100 °F (37.8 °C) Temporal 87 16 98 %   25 1800 121/65 -- -- 78 15 93 %   25 1600 114/59 -- -- 89 14 94 %   25 1400 116/63 -- -- 82 15 93 %   25 1200 100/63 98.6 °F (37 °C) Temporal 83 19 93 %   25 1130 116/75 -- -- 86 -- --   25 1000 116/75 -- -- 79 19 94 %   25 0800 110/76 98.1 °F (36.7 °C) Temporal 80 16 96 %   \  Intake/Output:   Last 3 shifts:   Intake/Output                   25 0700 - 25 0659 25 0700 - 25 0659 25 0700 - 25 0659       Intake    P.O.  120  550  --    P.O. 120 550 --    Total Intake 120 550 --       Output    Urine  1100  1600  --    Urine 400 1600 --    Urine Occurrence 7 x 5 x --    Output (mL) ([REMOVED] Urethral Catheter Latex;Double-lumen;Temperature probe) 700 -- --    Stool  --  --  --    Stool Count Calculated for I/O -- 1 x --    Chest Tube  280  --  --    Output (mL) (Y Chest Tube 1 and 2 Anterior Mediastinal 32 Fr. Anterior Pleural 32 Fr.) 280 -- --    Total Output 1380 1600 --       Net I/O     -1260 -1050 --             Scheduled Meds: Scheduled Medications[1]    Continuous Infusions: Medication Infusions[2]    Results:     Lab Results   Component Value Date    WBC 11.3 (H) 2025    HGB 12.0 (L) 2025    HCT 36.3 (L) 2025    .0  (L) 04/27/2025    CREATSERUM 1.08 04/27/2025    BUN 25 (H) 04/27/2025     04/27/2025    K 4.2 04/27/2025     04/27/2025    CO2 32.0 04/27/2025     (H) 04/27/2025    CA 9.8 04/27/2025    ALB 4.4 04/21/2025    ALKPHO 66 04/21/2025    BILT 0.9 04/21/2025    TP 6.8 04/21/2025    AST 27 04/21/2025    ALT 30 04/21/2025    PTT 30.4 04/23/2025    INR 1.45 (H) 04/23/2025    T4F 1.0 08/01/2022    TSH 1.926 11/21/2023    PSA 0.68 06/07/2023    ESRML 18 04/04/2023    CRP 0.31 (H) 04/04/2023    MG 2.3 04/26/2025    B12 1,098 (H) 11/21/2023       Recent Labs   Lab 04/24/25  0538 04/26/25  0400 04/27/25  0438   * 110* 106*   BUN 12 26* 25*   CREATSERUM 1.04 1.10 1.08   CA 9.3 9.6 9.8    141 141   K 4.1 3.9 4.2    104 102   CO2 25.0 31.0 32.0     Recent Labs   Lab 04/21/25  0743 04/23/25  1157 04/24/25  0538 04/26/25  0400 04/27/25  0438   RBC 4.89   < > 3.30* 3.68* 3.99   HGB 14.4   < > 9.9* 11.2* 12.0*   HCT 45.2   < > 29.7* 33.8* 36.3*   MCV 92.4   < > 90.0 91.8 91.0   MCH 29.4   < > 30.0 30.4 30.1   MCHC 31.9   < > 33.3 33.1 33.1   RDW 13.5   < > 13.9 14.4 13.9   NEPRELIM 2.54  --  10.41* 9.59*  --    WBC 5.9   < > 11.8* 13.4* 11.3*   .0*   < > 129.0* 111.0* 131.0*    < > = values in this interval not displayed.       No results for input(s): \"BNPML\" in the last 168 hours.    No results for input(s): \"TROP\", \"CK\" in the last 168 hours.    XR CHEST AP PORTABLE  (CPT=71045)  Result Date: 4/26/2025  CONCLUSION:  1. Left apical pneumothorax measuring between 2 and 3 cm from the apex. 2. Subcutaneous emphysema extending into the neck.  Mild pneumomediastinum. 3. Stable mild cardiomegaly.  Post coronary artery bypass. 4. Dual-chamber pacemaker. 5. Perihilar and basilar atelectasis. 6. Perfect serve message sent to PINO Ortega    Dictated by (CST): Kulwant Howe MD on 4/26/2025 at 7:27 PM     Finalized by (CST): Kulwant Howe MD on 4/26/2025 at 7:37 PM          XR CHEST AP PORTABLE   (CPT=71045)  Result Date: 4/26/2025  CONCLUSION: Small left pleural effusion slightly larger since previous exam.  Left basal pulmonary opacity which could represent atelectasis.  Post CABG.  Post cardiac pacing device.    Dictated by (CST): Karan Sterling MD on 4/26/2025 at 7:01 AM     Finalized by (CST): Karan Sterling MD on 4/26/2025 at 7:02 AM                     Exam:     General: Alert and oriented x 3. No apparent distress.   HEENT: Normocephalic, anicteric sclera, no thyromegaly or adenopathy.  Neck edema and bruising.  Neck: No JVD, carotids 2+, no bruits.  Cardiac: Regular rate and rhythm. S1, S2 normal. No murmur, pericardial rub, S3, or extra cardiac sounds.  Lungs: Clear without wheezes, rales, rhonchi or dullness.  Normal excursions and effort.  Abdomen: Soft, non-tender. No organosplenomegally, mass or rebound, BS-present.  Extremities: Without clubbing or cyanosis. No edema.    Neurologic: Alert and oriented, normal affect. No focal defects  Skin: Warm and dry.     Assessment and Plan:   Assessment:     1.  CAD.  Status post CABG x 3 4/23/2025.  LIMA to LAD, SVG to OM, SVG to PDA.  POD #4.     --Stable blood pressure and rhythm.  Stable hemodynamics.  -- Weaned off nitroglycerin and dobutamine.     2.  Left-sided pneumothorax and subcutaneous emphysema.  Occurred yesterday shortly after chest tube removal     Plan:     Management of pneumothorax and subcutaneous emphysema per pulmonary  Continue current cardiac medications.    Stephane Foster MD  [facility]  4/27/2025    =       [1]    ferrous sulfate  325 mg Oral BID with meals    furosemide  20 mg Intravenous BID (Diuretic)    potassium chloride  20 mEq Oral Daily    ezetimibe  10 mg Oral Nightly    atorvastatin  80 mg Oral Nightly    sennosides  8.6 mg Oral BID    docusate sodium  100 mg Oral BID    metoclopramide  10 mg Intravenous Q6H    famotidine  20 mg Oral BID    metoprolol tartrate  25 mg Oral 2x Daily(Beta Blocker)     mupirocin  1 Application Nasal BID    chlorhexidine gluconate  15 mL Mouth/Throat BID    multivitamin  1 tablet Oral Daily    ascorbic acid  500 mg Oral TID    enoxaparin  40 mg Subcutaneous Daily    clopidogrel  75 mg Oral Daily    aspirin  81 mg Oral Daily   [2]

## 2025-04-27 NOTE — PLAN OF CARE
Patient alert and oriented to person, place, time and, situation. RA. Subcutaneous Emphysema present, but improving. Pt denied SOB or chest pain throughout shift. Ambulated in hook and tolerated well. Pt up to shower today and was educated on proper incision care. Pt encouraged to increased PO intake, however, reporting poor appetitie, family agreed to bring home foods to encourage eating.     Call light within reach. Safety measures maintained. No signs of injury.     Problem: Patient Centered Care  Goal: Patient preferences are identified and integrated in the patient's plan of care  Description: Interventions:- What would you like us to know as we care for you?  I have two daughters- Provide timely, complete, and accurate information to patient/family- Incorporate patient and family knowledge, values, beliefs, and cultural backgrounds into the planning and delivery of care- Encourage patient/family to participate in care and decision-making at the level they choose- Honor patient and family perspectives and choices  Outcome: Progressing     Problem: Diabetes/Glucose Control  Goal: Glucose maintained within prescribed range  Description: INTERVENTIONS:- Monitor Blood Glucose as ordered- Assess for signs and symptoms of hyperglycemia and hypoglycemia- Administer ordered medications to maintain glucose within target range- Assess barriers to adequate nutritional intake and initiate nutrition consult as needed- Instruct patient on self management of diabetes  Outcome: Progressing     Problem: CARDIOVASCULAR - ADULT  Goal: Maintains optimal cardiac output and hemodynamic stability  Description: INTERVENTIONS:- Monitor vital signs, rhythm, and trends- Monitor for bleeding, hypotension and signs of decreased cardiac output- Evaluate effectiveness of vasoactive medications to optimize hemodynamic stability- Monitor arterial and/or venous puncture sites for bleeding and/or hematoma- Assess quality of pulses, skin color  and temperature- Assess for signs of decreased coronary artery perfusion - ex. Angina- Evaluate fluid balance, assess for edema, trend weights  Outcome: Progressing  Goal: Absence of cardiac arrhythmias or at baseline  Description: INTERVENTIONS:- Continuous cardiac monitoring, monitor vital signs, obtain 12 lead EKG if indicated- Evaluate effectiveness of antiarrhythmic and heart rate control medications as ordered- Initiate emergency measures for life threatening arrhythmias- Monitor electrolytes and administer replacement therapy as ordered  Outcome: Progressing     Problem: RESPIRATORY - ADULT  Goal: Achieves optimal ventilation and oxygenation  Description: INTERVENTIONS:- Assess for changes in respiratory status- Assess for changes in mentation and behavior- Position to facilitate oxygenation and minimize respiratory effort- Oxygen supplementation based on oxygen saturation or ABGs- Provide Smoking Cessation handout, if applicable- Encourage broncho-pulmonary hygiene including cough, deep breathe, Incentive Spirometry- Assess the need for suctioning and perform as needed- Assess and instruct to report SOB or any respiratory difficulty- Respiratory Therapy support as indicated- Manage/alleviate anxiety- Monitor for signs/symptoms of CO2 retention  Outcome: Progressing     Problem: Integumentary status not within defined limits  Goal: Pt's integumentary status will be adequate for discharge  Outcome: Progressing     Problem: PAIN - ADULT  Goal: Verbalizes/displays adequate comfort level or patient's stated pain goal  Description: INTERVENTIONS:- Encourage pt to monitor pain and request assistance- Assess pain using appropriate pain scale- Administer analgesics based on type and severity of pain and evaluate response- Implement non-pharmacological measures as appropriate and evaluate response- Consider cultural and social influences on pain and pain management- Manage/alleviate anxiety- Utilize distraction and/or  relaxation techniques- Monitor for opioid side effects- Notify MD/LIP if interventions unsuccessful or patient reports new pain- Anticipate increased pain with activity and pre-medicate as appropriate  Outcome: Progressing

## 2025-04-27 NOTE — PROGRESS NOTES
Progress Note     Carolina Montanez Patient Status:  Inpatient    1950 MRN [id number]   Location [facility] 2W/SW Attending Eulalio Hernandez MD   Hosp Day # 4 PCP Logan Hernandez MD     Chief Complaint: patient feels ok. No pain. On room air.       Subjective:   S: No new issues reported per RN.  Patient sitting up in the chair. No fever.     Review of Systems:   10 point ROS completed and was negative, except for pertinent positive and negatives stated in subjective.    Objective:   Vital signs:  Temp:  [97.4 °F (36.3 °C)-100 °F (37.8 °C)] 97.6 °F (36.4 °C)  Pulse:  [] 89  Resp:  [14-23] 15  BP: ()/(48-77) 96/56  SpO2:  [91 %-100 %] 95 %    Wt Readings from Last 6 Encounters:   25 150 lb 9.2 oz (68.3 kg)   25 159 lb 9.6 oz (72.4 kg)   25 159 lb (72.1 kg)   24 161 lb (73 kg)   24 160 lb (72.6 kg)   23 168 lb (76.2 kg)         Physical Exam:    General: No acute distress. Alert ,         Respiratory: Clear to auscultation bilaterally. No wheezes. No rhonchi.  Cardiovascular: S1, S2. Regular rate and rhythm. No murmurs, rubs or gallops.   Abdomen: Soft, nontender, nondistended.  Positive bowel sounds. No rebound or guarding.  Neurologic: No focal neurological deficits.   Musculoskeletal: Moves all extremities.  Extremities: No edema.    Results:   Diagnostic Data:      Labs:    Labs Last 24 Hours:   BMP     CBC    Other     Na 141 Cl 102 BUN 25 Glu 106   Hb 12.0   PTT - Procal -   K 4.2 CO2 32.0 Cr 1.08   WBC 11.3 >< .0  INR - CRP -   Renal Lytes Endo    Hct 36.3   Trop - D dim -   eGFR - Ca 9.8 POC Gluc  -    LFT   pBNP - Lactic -   eGFR AA - PO4 - A1c -   AST - APk - Prot -  LDL -     Mg - TSH -   ALT - T cameron - Alb -        COVID-19 Lab Results    COVID-19  Lab Results   Component Value Date    COVID19 Detected (A) 2021       Pro-Calcitonin  No results for input(s): \"PCT\" in the last 168 hours.    Cardiac  No results for input(s): \"TROP\", \"PBNP\" in  the last 168 hours.    Creatinine Kinase  No results for input(s): \"CK\" in the last 168 hours.    Inflammatory Markers  No results for input(s): \"CRP\", \"CARY\", \"LDH\", \"DDIMER\" in the last 168 hours.    Imaging: Imaging data reviewed in Epic.    Medications: Scheduled Medications[1]    Assessment & Plan:   ASSESSMENT / PLAN:       Coronary artery disease status post CABG x 3 on April 23  Blood pressure stable  On nitroglycerin and dobutamine-discontinue  Cardiothoracic surgery and cardiology following  Continue aspirin, Plavix, Lipitor, Metoprolol   Discharge planning per CV,  plans home with home health once improved.    PTX  - none see on CXR this morning per Pulm  - monitor O2 sats for now     Hypertension  Continue metoprolol     Hyperlipidemia  Continue Zetia and statins        Quality:  DVT Prophylaxis: Lovenox  CODE status:  FULL  DISPO: pending clinical improvement.   Estimated date of discharge: To be determined  Discharge is dependent on: Improved clinical status  At this point Patient is expected to be discharge to: Home versus rehab      Plan of care discussed with Patient and RN.     Coordinated care with providers and counseling re: treatment plan and workup    Elizabeth Quintero MD    Supplementary Documentation:         **Certification      PHYSICIAN Certification of Need for Inpatient Hospitalization - Initial Certification    Patient will require inpatient services that will reasonably be expected to span two midnight's based on the clinical documentation in H+P.   Based on patients current state of illness, I anticipate that, after discharge, patient will require TBD.             I personally reviewed the available laboratories, imaging including operative report. I discussed/will discuss the case with patient and her nurse. I ordered laboratories studies. I adjusted medications including not applicable today. Medical decision making high, risk is high.     >55min spent, >50% spent counseling and  coordinating care in the form of educating pt/family and d/w consultants and staff. Most of the time spent discussing the above plan.                 [1]    [START ON 4/28/2025] furosemide  20 mg Oral Daily    colchicine  0.6 mg Oral Daily    ezetimibe  10 mg Oral Nightly    atorvastatin  80 mg Oral Nightly    sennosides  8.6 mg Oral BID    docusate sodium  100 mg Oral BID    metoclopramide  10 mg Intravenous Q6H    famotidine  20 mg Oral BID    metoprolol tartrate  25 mg Oral 2x Daily(Beta Blocker)    mupirocin  1 Application Nasal BID    chlorhexidine gluconate  15 mL Mouth/Throat BID    multivitamin  1 tablet Oral Daily    ascorbic acid  500 mg Oral TID    enoxaparin  40 mg Subcutaneous Daily    clopidogrel  75 mg Oral Daily    aspirin  81 mg Oral Daily

## 2025-04-27 NOTE — PHYSICAL THERAPY NOTE
PHYSICAL THERAPY TREATMENT NOTE - INPATIENT     Room Number: 229/229-A       Presenting Problem: CAD, s/p CABG x 3    Problem List  Active Problems:    Preop testing    CAD (coronary artery disease)    Hx of CABG      PHYSICAL THERAPY ASSESSMENT   Patient demonstrates good  progress this session, goals  remain in progress.      Patient is requiring contact guard assist and moderate assist as a result of the following impairments: decreased functional strength, decreased endurance/aerobic capacity, pain, impaired standing balance, decreased muscular endurance, difficulty maintaining precautions, and medical status.     Patient continues to function below baseline with bed mobility, transfers, gait, stair negotiation, standing prolonged periods, and performing household tasks. Next session anticipate patient to progress bed mobility, transfers, gait, and standing prolonged periods. Physical Therapy will continue to follow patient for duration of hospitalization.    Patient continues to benefit from continued skilled PT services: at discharge to promote prior level of function and safety with additional support and return home with home health PT.    PLAN DURING HOSPITALIZATION  Nursing Mobility Recommendation : 1 Assist  PT Device Recommendation: Rolling walker  PT Treatment Plan: Bed mobility, Body mechanics, Patient education, Gait training, Strengthening, Transfer training, Balance training  Frequency (Obs): Daily     SUBJECTIVE  Agreeable     OBJECTIVE  Precautions: Sternal, Cardiac    PAIN ASSESSMENT   Rating: Unable to rate  Location: incision site and back  Management Techniques: Activity promotion, Body mechanics, Repositioning    BALANCE  Static Sitting: Good  Dynamic Sitting: Fair +  Static Standing: Fair  Dynamic Standing: Fair -    ACTIVITY TOLERANCE  Pulse: 103  Heart Rate Source: Monitor     BP: 107/73  BP Location: Right arm  BP Method: Automatic  Patient Position: Sitting     O2 WALK  Oxygen  Therapy  SPO2% on Room Air at Rest: 93  SPO2% Ambulation on Room Air: 88 (recovered to >90% with rest break and cues for pursed lip breathing)    AM-PAC '6-Clicks' INPATIENT SHORT FORM - BASIC MOBILITY  How much difficulty does the patient currently have...  Patient Difficulty: Turning over in bed (including adjusting bedclothes, sheets and blankets)?: A Little   Patient Difficulty: Sitting down on and standing up from a chair with arms (e.g., wheelchair, bedside commode, etc.): A Lot   Patient Difficulty: Moving from lying on back to sitting on the side of the bed?: A Little   How much help from another person does the patient currently need...   Help from Another: Moving to and from a bed to a chair (including a wheelchair)?: A Little   Help from Another: Need to walk in hospital room?: A Little   Help from Another: Climbing 3-5 steps with a railing?: A Little     AM-PAC Score:  Raw Score: 17   Approx Degree of Impairment: 50.57%   Standardized Score (AM-PAC Scale): 42.13   CMS Modifier (G-Code): CK    FUNCTIONAL ABILITY STATUS  Functional Mobility/Gait Assessment  Gait Assistance: Contact guard assist  Distance (ft): 200 ft, 25 ft x 2  Assistive Device: Rolling walker  Pattern: Shuffle (decreased cristina speed, decreased step length - cues provided for increased step length, narrow NICOLA, flexed posture - cues provided to look up for more upright standing posture)  Stairs: Other (comment)  Sit to Stand: moderate assist from bedside chair and CGA from toilet; max cues provided for appropriate hand placement during functional transfers    Skilled Therapy Provided: Patient sitting in bedside chair upon arrival. RN approved activity. Educated patient on POC and benefits of mobilization. Agreeable to participate. Patient reporting sternal incision site and back pain,not quantified per the pain scale. Re-enforced education on post-op sternal precautions prior to mobilization. After ambulating in hallway, patient  requesting to go to the bathroom; assisted patient with ambulating to/from bathroom as well. Patient benefits from increased time, cues, encouragement, and intermittent assistance in order to complete functional mobility tasks. Strongly encouraged and educated patient on benefits of frequent position changes and ambulating as tolerated.     The patient's Approx Degree of Impairment: 50.57% has been calculated based on documentation in the [facility] '6 clicks' Inpatient Daily Activity Short Form.  Research supports that patients with this level of impairment may benefit from HHPT.  Final disposition will be made by interdisciplinary medical team.    Patient End of Session: Up in chair, Needs met, Call light within reach, RN aware of session/findings, All patient questions and concerns addressed, Hospital anti-slip socks, Family present    CURRENT GOALS   Goals to be met by: 5/2/25  Patient Goal Patient's self-stated goal is: none stated   Goal #1 Patient is able to demonstrate supine - sit EOB @ level: supervision      Goal #1   Current Status  NT    Goal #2 Patient is able to demonstrate transfers Sit to/from Stand at assistance level: supervision with walker - rolling      Goal #2  Current Status  Mod A from bedside chair, CGA from toilet   Goal #3 Patient is able to ambulate 300 feet with assist device: walker - rolling at assistance level: supervision   Goal #3   Current Status   ft and 25 ft x 2 with RW   Goal #4 Patient will negotiate 12 stairs/one curb w/ assistive device and supervision   Goal #4   Current Status  NT   Goal #5 Patient to demonstrate independence with home activity/exercise instructions provided to patient in preparation for discharge.   Goal #5   Current Status  Ongoing     Gait Training: 15 minutes  Therapeutic Activity: 11 minutes

## 2025-04-28 NOTE — CARDIAC REHAB
Cardiac Rehab Phase I    Activity:   Chair: yes    Ambulation: yes   Assistive Device: rolling walker   Distance: 200 feet   Assistance needed: minimal   Patient tolerated activity: well, no complaints.    Education:  Handouts provided and reviewed: Heart Surgery Binder.   Patient instructed on: I.S. / Acapella and Cough and Deep Breathe.     Diet: Healthy Cardiac diet reviewed.    Disease Process: Disease process reviewed.    Weight Monitoring: Instructed on daily weights.    I.S. and/or Acapella: Patient instructed on incentive spirometer and/or acapella with good return demonstration.    Infection: Reviewed signs and symptoms of infection. Infection prevention and when to notify MD.

## 2025-04-28 NOTE — PHYSICAL THERAPY NOTE
PHYSICAL THERAPY TREATMENT NOTE - INPATIENT     Room Number: 229/229-A       Presenting Problem: CAD, s/p CABG x 3    Problem List  Active Problems:    Preop testing    CAD (coronary artery disease)    Hx of CABG    Angina concurrent with and due to arteriosclerosis of CABG      PHYSICAL THERAPY ASSESSMENT   Patient demonstrates good  progress this session, goals  remain in progress.      Patient is requiring contact guard assist as a result of the following impairments: decreased functional strength, decreased endurance/aerobic capacity, pain, impaired standing balance, decreased muscular endurance, and medical status.     Patient continues to function below baseline with bed mobility, transfers, gait, stair negotiation, standing prolonged periods, and performing household tasks.  Next session anticipate patient to progress bed mobility, transfers, gait, stair negotiation, and standing prolonged periods.  Physical Therapy will continue to follow patient for duration of hospitalization.    Patient continues to benefit from continued skilled PT services: at discharge to promote prior level of function and safety with additional support and return home with home health PT.    PLAN DURING HOSPITALIZATION  Nursing Mobility Recommendation : 1 Assist  PT Device Recommendation: Rolling walker  PT Treatment Plan: Bed mobility, Body mechanics, Patient education, Gait training, Strengthening, Transfer training, Balance training  Frequency (Obs): Daily     SUBJECTIVE  \"I don't mind walking.\"    OBJECTIVE  Precautions: Sternal, Cardiac    PAIN ASSESSMENT   Rating: Unable to rate  Location: incision site  Management Techniques: Body mechanics, Repositioning    BALANCE  Static Sitting: Good  Dynamic Sitting: Fair +  Static Standing: Fair  Dynamic Standing: Fair -    ACTIVITY TOLERANCE  Pulse: 95  Heart Rate Source: Monitor     O2 WALK  Oxygen Therapy  SPO2% on Room Air at Rest: 96    AM-PAC '6-Clicks' INPATIENT SHORT FORM - BASIC  MOBILITY  How much difficulty does the patient currently have...  Patient Difficulty: Turning over in bed (including adjusting bedclothes, sheets and blankets)?: A Little   Patient Difficulty: Sitting down on and standing up from a chair with arms (e.g., wheelchair, bedside commode, etc.): A Little   Patient Difficulty: Moving from lying on back to sitting on the side of the bed?: A Little   How much help from another person does the patient currently need...   Help from Another: Moving to and from a bed to a chair (including a wheelchair)?: A Little   Help from Another: Need to walk in hospital room?: A Little   Help from Another: Climbing 3-5 steps with a railing?: A Little     AM-PAC Score:  Raw Score: 18   Approx Degree of Impairment: 46.58%   Standardized Score (AM-PAC Scale): 43.63   CMS Modifier (G-Code): CK    FUNCTIONAL ABILITY STATUS  Functional Mobility/Gait Assessment  Gait Assistance:  (CGA>SBA)  Distance (ft): 200 ft  Assistive Device: Rolling walker  Pattern: Shuffle (decreased cristina speed, decreased step length, flexed posture - cues provided to look up in order to obtain more upright standing posture)  Stairs: Other (comment)  Sit to Stand: contact guard assist from bedside chair and toilet; cues provided for appropriate hand placement during functional transfers    Skilled Therapy Provided: Patient sitting in bedside chair upon arrival. RN approved activity. Educated patient on POC and benefits of mobilization. Agreeable to participate. Patient reporting incision site pain, not quantified per the pain scale. Patient benefits from increased time and cues in order to complete functional mobility tasks. Re-enforced education on post-op sternal precautions prior to mobilization. Patient requiring less assistance with sit<>stand transfers this session. Encouraged patient to continue ambulating with nursing staff as able.     The patient's Approx Degree of Impairment: 46.58% has been calculated based  on documentation in the [facility] '6 clicks' Inpatient Daily Activity Short Form.  Research supports that patients with this level of impairment may benefit from HHPT.  Final disposition will be made by interdisciplinary medical team.    Patient End of Session: Up in chair, Needs met, Call light within reach, RN aware of session/findings, All patient questions and concerns addressed, Hospital anti-slip socks    CURRENT GOALS   Goals to be met by: 5/2/25  Patient Goal Patient's self-stated goal is: none stated   Goal #1 Patient is able to demonstrate supine - sit EOB @ level: supervision      Goal #1   Current Status  NT    Goal #2 Patient is able to demonstrate transfers Sit to/from Stand at assistance level: supervision with walker - rolling      Goal #2  Current Status  CGA from bedside chair and toilet   Goal #3 Patient is able to ambulate 300 feet with assist device: walker - rolling at assistance level: supervision   Goal #3   Current Status   ft with RW   Goal #4 Patient will negotiate 12 stairs/one curb w/ assistive device and supervision   Goal #4   Current Status  NT   Goal #5 Patient to demonstrate independence with home activity/exercise instructions provided to patient in preparation for discharge.   Goal #5   Current Status  Ongoing     Gait Training: 15 minutes

## 2025-04-28 NOTE — PROGRESS NOTES
[facility]  part of [facility]    Progress Note    Carolina Montanez Patient Status:  Inpatient    1950 MRN [id number]   Location [facility] 2W/SW Attending Elizabeth Quintero MD   Hosp Day # 5 PCP Logan Hernandez MD     Subjective:  Pt sitting in the chair. He denies pain. C/O feeling SOB. Currently on room at w/O2 sat=>95%  No visitors at bedside.     Objective:  /69 (BP Location: Right arm)   Pulse 79   Temp 98.4 °F (36.9 °C) (Temporal)   Resp 18   Ht 5' 9.02\" (1.753 m)   Wt 150 lb 12.7 oz (68.4 kg)   SpO2 96%   BMI 22.26 kg/m²     Temp (24hrs), Av.3 °F (36.8 °C), Min:97.6 °F (36.4 °C), Max:99.4 °F (37.4 °C)      Intake/Output:    Intake/Output Summary (Last 24 hours) at 2025 0920  Last data filed at 2025 0600  Gross per 24 hour   Intake 260 ml   Output --   Net 260 ml       Wt Readings from Last 3 Encounters:   25 150 lb 12.7 oz (68.4 kg)   25 159 lb 9.6 oz (72.4 kg)   25 159 lb (72.1 kg)       Allergies:  Allergies[1]    Labs:  Lab Results   Component Value Date    WBC 10.4 2025    HGB 11.8 2025    HCT 37.3 2025    .0 2025    CREATSERUM 1.09 2025    BUN 31 2025     2025    K 4.2 2025     2025    CO2 31.0 2025     2025    CA 9.5 2025       Physical Exam:  Blood pressure 106/69, pulse 79, temperature 98.4 °F (36.9 °C), temperature source Temporal, resp. rate 18, height 5' 9.02\" (1.753 m), weight 150 lb 12.7 oz (68.4 kg), SpO2 96%.  General: NAD  Neck: +  palpable Crepitus  Lungs: Clear and diminished bilaterally   Heart: RRR, S1, S2  Abdomen: Soft/Round, NT/ND, BS+x4/+Flatus/+BM   Extremities: Warm, dry, no LE edema bilat  Pulses: 1+ bilat DP  Skin: sternotomy incision FAISAL=CDI/Left leg incision FAISAL=CDI   Neurological:  AAOx3, MAEW    Assessment/Plan:  S/P CABG x 3 POD # 5  Encourage pulm toilet: IS. Currently on room air gabrielle well. CXR  reviewed-continues w/Bilateral pneumothx decreased on Left/slightly increased on Right. + neck crepitus noted-pt states feels about the same as previous days. Anticipate pneumothx resolving with time. Repeat CXR tomorrow. Able to perform approx 750-1000cc w/IS.  Pain meds as needed  Increase activity-OOB to chair and ambulate in hallway-showered- will ask PT/OT to see pt as pt continues somewhat weak and unmotivated.   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 Pt aware. Recommend further discussion with his PCP during his follow up appt   Discharge planning: pt lives with his wife. Plan for home when ready-anticipate next 24-48 hours- discussed w/patient.     Plan of care discussed w/patient/RN and CV Surgeon: Dr. Mary Levine, APRN  4/28/2025  9:20 AM       [1] No Known Allergies

## 2025-04-28 NOTE — OCCUPATIONAL THERAPY NOTE
OCCUPATIONAL THERAPY TREATMENT NOTE - INPATIENT        Room Number: 229/229-A     Presenting Problem: CABG    Problem List  Active Problems:    Preop testing    CAD (coronary artery disease)    Hx of CABG    Angina concurrent with and due to arteriosclerosis of CABG      OCCUPATIONAL THERAPY ASSESSMENT   Patient demonstrates good  progress this session, goals remain in progress.  Patient continues to benefit from continued skilled OT services: at discharge to promote prior level of function and safety with additional support and return home with home health OT.     PLAN DURING HOSPITALIZATION     OT Treatment Plan: ADL training, Functional transfer training, Patient/Family education, Patient/Family training, Equipment eval/education, Continued evaluation     SUBJECTIVE  I need to use the bathroom     OBJECTIVE  Precautions: Sternal, Cardiac    WEIGHT BEARING RESTRICTION     PAIN ASSESSMENT  Rating: Unable to rate  Location: back       ACTIVITIES OF DAILY LIVING ASSESSMENT  AM-PAC ‘6-Clicks’ Inpatient Daily Activity Short Form  How much help from another person does the patient currently need…  -   Putting on and taking off regular lower body clothing?: A Lot  -   Bathing (including washing, rinsing, drying)?: A Little  -   Toileting, which includes using toilet, bedpan or urinal? : A Little  -   Putting on and taking off regular upper body clothing?: A Little  -   Taking care of personal grooming such as brushing teeth?: A Little  -   Eating meals?: A Little    AM-PAC Score:  Score: 17  Approx Degree of Impairment: 50.11%  Standardized Score (AM-PAC Scale): 37.26  CMS Modifier (G-Code): CK    FUNCTIONAL TRANSFER ASSESSMENT  Sit to Stand: Chair  Chair: Contact Guard Assist  Toilet Transfer: Contact Guard Assist    BED MOBILITY     BALANCE ASSESSMENT  Static Sitting: Stand-by Assist  Static Standing: Contact Guard Assist    FUNCTIONAL ADL ASSESSMENT  Eating: Supervision  Grooming Seated: Supervision  Bathing Seated:  Minimal Assist  UB Dressing Seated: Minimal Assist  LB Dressing Seated: Minimal Assist  Toileting Seated: Stand-by Assist       Skilled Therapy Provided: TF training, Sternal precautions training, up and ambulatory >household distances with RW, ADL retraining, Toileting training; cues to adhere to precautions throughout.     EDUCATION PROVIDED     The patient's Approx Degree of Impairment: 50.11% has been calculated based on documentation in the [facility] '6 clicks' Inpatient Daily Activity Short Form.  Research supports that patients with this level of impairment may benefit from .HH  Final disposition will be made by interdisciplinary medical team.    Patient End of Session: Up in chair        OT Goals  Patients self stated goal is: none stated     Patient will complete functional transfer with SBA  Comment: ongoing    Patient will complete toileting with SBA  Comment: ongoing     Patient will tolerate standing for 4 minutes in prep for adls with SBA   Comment:    Patient will complete item retrieval with SBA  Comment:          Goals  on: 25  Frequency: 3-5x/week    OT Session Time: 23 minutes  Self-Care Home Management: 8 minutes  Therapeutic Activity: 15 minutes    Pete Abrams, Occupational Therapist, OTR/L ext [phone]

## 2025-04-28 NOTE — PROGRESS NOTES
Progress Note     Carolina Montanez Patient Status:  Inpatient    1950 MRN [id number]   Location [facility] 2W/SW Attending Eulalio Hernandez MD   Hosp Day # 5 PCP Logan Hernandez MD     Chief Complaint: patient feels ok. No pain. On room air.       Subjective:   S: Patient is resting in the chair. No new complaints.     Review of Systems:   10 point ROS completed and was negative, except for pertinent positive and negatives stated in subjective.    Objective:   Vital signs:  Temp:  [97.6 °F (36.4 °C)-99.4 °F (37.4 °C)] 97.8 °F (36.6 °C)  Pulse:  [] 92  Resp:  [11-21] 17  BP: ()/(55-76) 120/76  SpO2:  [93 %-99 %] 97 %    Wt Readings from Last 6 Encounters:   25 150 lb 12.7 oz (68.4 kg)   25 159 lb 9.6 oz (72.4 kg)   25 159 lb (72.1 kg)   24 161 lb (73 kg)   24 160 lb (72.6 kg)   23 168 lb (76.2 kg)         Physical Exam:    General: No acute distress. Alert ,         Respiratory: Clear to auscultation bilaterally. No wheezes. No rhonchi.  Cardiovascular: S1, S2. Regular rate and rhythm. No murmurs, rubs or gallops.   Abdomen: Soft, nontender, nondistended.  Positive bowel sounds. No rebound or guarding.  Neurologic: No focal neurological deficits.   Musculoskeletal: Moves all extremities.  Extremities: No edema.    Results:   Diagnostic Data:      Labs:    Labs Last 24 Hours:   BMP     CBC    Other     Na 141 Cl 102 BUN 31 Glu 124   Hb 11.8   PTT - Procal -   K 4.2 CO2 31.0 Cr 1.09   WBC 10.4 >< .0  INR - CRP -   Renal Lytes Endo    Hct 37.3   Trop - D dim -   eGFR - Ca 9.5 POC Gluc  115    LFT   pBNP - Lactic -   eGFR AA - PO4 - A1c -   AST - APk - Prot -  LDL -     Mg - TSH -   ALT - T cameron - Alb -        COVID-19 Lab Results    COVID-19  Lab Results   Component Value Date    COVID19 Detected (A) 2021       Pro-Calcitonin  No results for input(s): \"PCT\" in the last 168 hours.    Cardiac  No results for input(s): \"TROP\", \"PBNP\" in the last 168  hours.    Creatinine Kinase  No results for input(s): \"CK\" in the last 168 hours.    Inflammatory Markers  No results for input(s): \"CRP\", \"CARY\", \"LDH\", \"DDIMER\" in the last 168 hours.    Imaging: Imaging data reviewed in Epic.    Medications: Scheduled Medications[1]    Assessment & Plan:   ASSESSMENT / PLAN:       Coronary artery disease status post CABG x 3 on April 23  Blood pressure stable  On nitroglycerin and dobutamine-discontinue  Cardiothoracic surgery and cardiology following  Continue aspirin, Plavix, Lipitor, Metoprolol   Discharge planning per CV,  plans home with home health once improved.    PTX  - none see on CXR this morning per Pulm  - monitor O2 sats for now   - CXR in AM     Hypertension  Continue metoprolol     Hyperlipidemia  Continue Zetia and statins        Quality:  DVT Prophylaxis: Lovenox  CODE status:  FULL  DISPO: pending clinical improvement.   Estimated date of discharge: To be determined  Discharge is dependent on: Improved clinical status  At this point Patient is expected to be discharge to: Home versus rehab      Plan of care discussed with Patient and RN.     Coordinated care with providers and counseling re: treatment plan and workup    Elizabeth Quintero MD    Supplementary Documentation:         **Certification      PHYSICIAN Certification of Need for Inpatient Hospitalization - Initial Certification    Patient will require inpatient services that will reasonably be expected to span two midnight's based on the clinical documentation in H+P.   Based on patients current state of illness, I anticipate that, after discharge, patient will require TBD.             I personally reviewed the available laboratories, imaging including operative report. I discussed/will discuss the case with patient and her nurse. I ordered laboratories studies. I adjusted medications including not applicable today. Medical decision making high, risk is high.     >55min spent, >50% spent counseling and  coordinating care in the form of educating pt/family and d/w consultants and staff. Most of the time spent discussing the above plan.                 [1]    furosemide  20 mg Oral Daily    colchicine  0.6 mg Oral Daily    ezetimibe  10 mg Oral Nightly    atorvastatin  80 mg Oral Nightly    sennosides  8.6 mg Oral BID    docusate sodium  100 mg Oral BID    metoclopramide  10 mg Intravenous Q6H    famotidine  20 mg Oral BID    metoprolol tartrate  25 mg Oral 2x Daily(Beta Blocker)    chlorhexidine gluconate  15 mL Mouth/Throat BID    multivitamin  1 tablet Oral Daily    ascorbic acid  500 mg Oral TID    enoxaparin  40 mg Subcutaneous Daily    clopidogrel  75 mg Oral Daily    aspirin  81 mg Oral Daily

## 2025-04-28 NOTE — PROGRESS NOTES
[facility] - CARDIOLOGY PROGRESS NOTE  Carolina Montanez Patient Status:  Inpatient    1950 MRN [id number]   Location [facility] 2W/SW Attending Elizabeth Quintero MD   Hosp Day # 5 PCP Logan Hernandez MD     Assessment:    1.  CAD.  Status post CABG x 3 2025.  LIMA to LAD, SVG to OM, SVG to PDA.  POD #5.     --Stable blood pressure and rhythm.  Stable hemodynamics.  -- Overall cardiac status stable.  Permanent pacemaker, functioning well.  --On DAPT, metoprolol, high-dose atorvastatin plus Zetia.     2.  Bilateral pneumothorax and pneumomediastinum with subcutaneous emphysema.  Occurred yesterday shortly after chest tube removal.        Plan:    Lung issues per CV and pulmonary service.    Overall cardiac status is stable.  Continue same medication.      Subjective:  No chest pain.  Patient dyspneic at rest.    Objective:  /69 (BP Location: Right arm)   Pulse 79   Temp 98.4 °F (36.9 °C) (Temporal)   Resp 18   Ht 175.3 cm (5' 9.02\")   Wt 150 lb 12.7 oz (68.4 kg)   SpO2 96%   BMI 22.26 kg/m²     Temp (24hrs), Av.3 °F (36.8 °C), Min:97.6 °F (36.4 °C), Max:99.4 °F (37.4 °C)      Intake/Output:    Intake/Output Summary (Last 24 hours) at 2025 0937  Last data filed at 2025 0600  Gross per 24 hour   Intake 260 ml   Output --   Net 260 ml       Wt Readings from Last 2 Encounters:   25 150 lb 12.7 oz (68.4 kg)   25 159 lb 9.6 oz (72.4 kg)       Physical Exam:    General: Alert and oriented x 3,  No apparent distress.  HEENT: No focal deficits.  Neck: No JVD, carotids 2+ no bruits.  Cardiac: Regular rate and rhythm, S1, S2 normal, no murmur  Lungs: Clear without wheezes, rales, rhonchi.  Normal excursions and effort.  Abdomen: Soft, non-tender.   Extremities: Without clubbing, cyanosis or edema.  Peripheral pulses are 2+.  Skin: Warm and dry.     Labs:  Lab Results    Component Value Date    WBC 10.4 04/28/2025    HGB 11.8 04/28/2025    HCT 37.3 04/28/2025    .0 04/28/2025     Lab Results   Component Value Date    INR 1.45 (H) 04/23/2025     Lab Results   Component Value Date     04/28/2025    K 4.2 04/28/2025     04/28/2025    CO2 31.0 04/28/2025    BUN 31 04/28/2025    CREATSERUM 1.09 04/28/2025     04/28/2025    CA 9.5 04/28/2025        No results found for: \"TROP\", \"CKMB\"     Medications:    Scheduled Medications[1]  Medication Infusions[2]    Perez Aguirre MD  4/28/2025  9:37 AM           [1]    furosemide  20 mg Oral Daily    colchicine  0.6 mg Oral Daily    ezetimibe  10 mg Oral Nightly    atorvastatin  80 mg Oral Nightly    sennosides  8.6 mg Oral BID    docusate sodium  100 mg Oral BID    metoclopramide  10 mg Intravenous Q6H    famotidine  20 mg Oral BID    metoprolol tartrate  25 mg Oral 2x Daily(Beta Blocker)    chlorhexidine gluconate  15 mL Mouth/Throat BID    multivitamin  1 tablet Oral Daily    ascorbic acid  500 mg Oral TID    enoxaparin  40 mg Subcutaneous Daily    clopidogrel  75 mg Oral Daily    aspirin  81 mg Oral Daily   [2]

## 2025-04-28 NOTE — PLAN OF CARE
Patient alert and oriented to person, place, time and, situation. Subcutaneous Emphysema present, but improving on left side.Subcutaneous Emphysema on right side has gotten a little larger. Pt denied SOB or chest pain throughout shift. Ambulated in hook several times today and tolerated well. Pt appetite has improved as well.    Problem: Patient Centered Care  Goal: Patient preferences are identified and integrated in the patient's plan of care  Description: Interventions:- What would you like us to know as we care for you?  I have two daughters- Provide timely, complete, and accurate information to patient/family- Incorporate patient and family knowledge, values, beliefs, and cultural backgrounds into the planning and delivery of care- Encourage patient/family to participate in care and decision-making at the level they choose- Honor patient and family perspectives and choices  4/28/2025 1654 by Natasha Avalos RN  Outcome: Progressing  4/28/2025 1653 by Natasha Avalos RN  Outcome: Progressing  4/28/2025 1300 by Natasha Avalos RN  Outcome: Progressing     Problem: Diabetes/Glucose Control  Goal: Glucose maintained within prescribed range  Description: INTERVENTIONS:- Monitor Blood Glucose as ordered- Assess for signs and symptoms of hyperglycemia and hypoglycemia- Administer ordered medications to maintain glucose within target range- Assess barriers to adequate nutritional intake and initiate nutrition consult as needed- Instruct patient on self management of diabetes  4/28/2025 1654 by Natasha Avalos RN  Outcome: Progressing  4/28/2025 1653 by Natasha Avalos RN  Outcome: Progressing  4/28/2025 1300 by Natasha Avalos RN  Outcome: Progressing     Problem: Patient/Family Goals  Goal: Patient/Family Long Term Goal  Description: Patient's Long Term Goal: Interventions:- - See additional Care Plan goals for specific interventions  4/28/2025 1654 by Natasha Avalos RN  Outcome: Progressing  4/28/2025  1653 by Natasha Avalos RN  Outcome: Progressing  4/28/2025 1300 by Natasha Avalos RN  Outcome: Progressing  Goal: Patient/Family Short Term Goal  Description: Patient's Short Term Goal: Interventions: - - See additional Care Plan goals for specific interventions  4/28/2025 1654 by Natasha Avalos RN  Outcome: Progressing  4/28/2025 1653 by Natasha Avalos RN  Outcome: Progressing  4/28/2025 1300 by Natasha Avalos RN  Outcome: Progressing     Problem: CARDIOVASCULAR - ADULT  Goal: Maintains optimal cardiac output and hemodynamic stability  Description: INTERVENTIONS:- Monitor vital signs, rhythm, and trends- Monitor for bleeding, hypotension and signs of decreased cardiac output- Evaluate effectiveness of vasoactive medications to optimize hemodynamic stability- Monitor arterial and/or venous puncture sites for bleeding and/or hematoma- Assess quality of pulses, skin color and temperature- Assess for signs of decreased coronary artery perfusion - ex. Angina- Evaluate fluid balance, assess for edema, trend weights  4/28/2025 1654 by Natasha Avalos RN  Outcome: Progressing  4/28/2025 1653 by Natasha Avalos RN  Outcome: Progressing  4/28/2025 1300 by Natasha Avalos RN  Outcome: Progressing  Goal: Absence of cardiac arrhythmias or at baseline  Description: INTERVENTIONS:- Continuous cardiac monitoring, monitor vital signs, obtain 12 lead EKG if indicated- Evaluate effectiveness of antiarrhythmic and heart rate control medications as ordered- Initiate emergency measures for life threatening arrhythmias- Monitor electrolytes and administer replacement therapy as ordered  4/28/2025 1654 by Natasha Avalos RN  Outcome: Progressing  4/28/2025 1653 by Natasha Avalos RN  Outcome: Progressing  4/28/2025 1300 by Natasha Avalos RN  Outcome: Progressing     Problem: RESPIRATORY - ADULT  Goal: Achieves optimal ventilation and oxygenation  Description: INTERVENTIONS:- Assess for changes in respiratory  status- Assess for changes in mentation and behavior- Position to facilitate oxygenation and minimize respiratory effort- Oxygen supplementation based on oxygen saturation or ABGs- Provide Smoking Cessation handout, if applicable- Encourage broncho-pulmonary hygiene including cough, deep breathe, Incentive Spirometry- Assess the need for suctioning and perform as needed- Assess and instruct to report SOB or any respiratory difficulty- Respiratory Therapy support as indicated- Manage/alleviate anxiety- Monitor for signs/symptoms of CO2 retention  4/28/2025 1654 by Natasha Avalos RN  Outcome: Progressing  4/28/2025 1653 by Natasha Avalos RN  Outcome: Progressing  4/28/2025 1300 by Natasha Avalos RN  Outcome: Progressing     Problem: Integumentary status not within defined limits  Goal: Pt's integumentary status will be adequate for discharge  4/28/2025 1654 by Natasha Avalos RN  Outcome: Progressing  4/28/2025 1653 by Natasha Avalos RN  Outcome: Progressing  4/28/2025 1300 by Natasha Avalos RN  Outcome: Progressing     Problem: PAIN - ADULT  Goal: Verbalizes/displays adequate comfort level or patient's stated pain goal  Description: INTERVENTIONS:- Encourage pt to monitor pain and request assistance- Assess pain using appropriate pain scale- Administer analgesics based on type and severity of pain and evaluate response- Implement non-pharmacological measures as appropriate and evaluate response- Consider cultural and social influences on pain and pain management- Manage/alleviate anxiety- Utilize distraction and/or relaxation techniques- Monitor for opioid side effects- Notify MD/LIP if interventions unsuccessful or patient reports new pain- Anticipate increased pain with activity and pre-medicate as appropriate  4/28/2025 1654 by Natasha Avalos RN  Outcome: Progressing  4/28/2025 1653 by Natasha Avalos RN  Outcome: Progressing  4/28/2025 1300 by Natasha Avalos RN  Outcome: Progressing

## 2025-04-28 NOTE — PLAN OF CARE
Minimal subcutaneous emphysema noted, still present on left lateral side of neck and anterior chest but minimally palpable. Able to ambulate with walker and had large stool overnight. Minimal pain noted.    Problem: Patient Centered Care  Goal: Patient preferences are identified and integrated in the patient's plan of care  Description: Interventions:- What would you like us to know as we care for you?  I have two daughters- Provide timely, complete, and accurate information to patient/family- Incorporate patient and family knowledge, values, beliefs, and cultural backgrounds into the planning and delivery of care- Encourage patient/family to participate in care and decision-making at the level they choose- Honor patient and family perspectives and choices  Outcome: Progressing     Problem: Diabetes/Glucose Control  Goal: Glucose maintained within prescribed range  Description: INTERVENTIONS:- Monitor Blood Glucose as ordered- Assess for signs and symptoms of hyperglycemia and hypoglycemia- Administer ordered medications to maintain glucose within target range- Assess barriers to adequate nutritional intake and initiate nutrition consult as needed- Instruct patient on self management of diabetes  Outcome: Progressing     Problem: Patient/Family Goals  Goal: Patient/Family Long Term Goal  Description: Patient's Long Term Goal: Interventions:- - See additional Care Plan goals for specific interventions  Outcome: Progressing  Goal: Patient/Family Short Term Goal  Description: Patient's Short Term Goal: Interventions: - - See additional Care Plan goals for specific interventions  Outcome: Progressing     Problem: CARDIOVASCULAR - ADULT  Goal: Maintains optimal cardiac output and hemodynamic stability  Description: INTERVENTIONS:- Monitor vital signs, rhythm, and trends- Monitor for bleeding, hypotension and signs of decreased cardiac output- Evaluate effectiveness of vasoactive medications to optimize hemodynamic  stability- Monitor arterial and/or venous puncture sites for bleeding and/or hematoma- Assess quality of pulses, skin color and temperature- Assess for signs of decreased coronary artery perfusion - ex. Angina- Evaluate fluid balance, assess for edema, trend weights  Outcome: Progressing  Goal: Absence of cardiac arrhythmias or at baseline  Description: INTERVENTIONS:- Continuous cardiac monitoring, monitor vital signs, obtain 12 lead EKG if indicated- Evaluate effectiveness of antiarrhythmic and heart rate control medications as ordered- Initiate emergency measures for life threatening arrhythmias- Monitor electrolytes and administer replacement therapy as ordered  Outcome: Progressing     Problem: RESPIRATORY - ADULT  Goal: Achieves optimal ventilation and oxygenation  Description: INTERVENTIONS:- Assess for changes in respiratory status- Assess for changes in mentation and behavior- Position to facilitate oxygenation and minimize respiratory effort- Oxygen supplementation based on oxygen saturation or ABGs- Provide Smoking Cessation handout, if applicable- Encourage broncho-pulmonary hygiene including cough, deep breathe, Incentive Spirometry- Assess the need for suctioning and perform as needed- Assess and instruct to report SOB or any respiratory difficulty- Respiratory Therapy support as indicated- Manage/alleviate anxiety- Monitor for signs/symptoms of CO2 retention  Outcome: Progressing     Problem: Integumentary status not within defined limits  Goal: Pt's integumentary status will be adequate for discharge  Outcome: Progressing     Problem: PAIN - ADULT  Goal: Verbalizes/displays adequate comfort level or patient's stated pain goal  Description: INTERVENTIONS:- Encourage pt to monitor pain and request assistance- Assess pain using appropriate pain scale- Administer analgesics based on type and severity of pain and evaluate response- Implement non-pharmacological measures as appropriate and evaluate  response- Consider cultural and social influences on pain and pain management- Manage/alleviate anxiety- Utilize distraction and/or relaxation techniques- Monitor for opioid side effects- Notify MD/LIP if interventions unsuccessful or patient reports new pain- Anticipate increased pain with activity and pre-medicate as appropriate  Outcome: Progressing

## 2025-04-28 NOTE — PROGRESS NOTES
[facility]  part of [facility]    Progress Note      Assessment and Plan:   1.  Status post coronary artery bypass grafting-postoperative day #5.  Chest x-ray looks good.  The patient is overall doing acceptably.  There is minimal dyspnea.  He has small bilateral apical pneumothoraces with modest subcutaneous emphysema.  Does not need a chest tube at this juncture.    Recommendations:  1.  Metoprolol, clopidogrel, aspirin, atorvastatin  2.  Aggressive antipain, antithrombosis and antiatelectasis measures.  3.  As per CV surgery and cardiology  4.  Will follow clinically.  5.  Morning chest x-ray     2.  DVT prophylaxis-Lovenox        Subjective:   Carolina Montanez is a(n) 74 year old male who is wakeful and out of bed without complaint    Objective:   Blood pressure 120/76, pulse 92, temperature 97.8 °F (36.6 °C), temperature source Temporal, resp. rate 17, height 5' 9.02\" (1.753 m), weight 150 lb 12.7 oz (68.4 kg), SpO2 97%.    Physical Exam alert male  HEENT examination is unremarkable with pupils equal round and reactive to light and accommodation.   Neck without adenopathy, thyromegaly, JVD nor bruit.   Lungs slightly diminished to auscultation and percussion.  Cardiac regular rate and rhythm no murmur.   Abdomen nontender, without hepatosplenomegaly and no mass appreciable.   Extremities without clubbing cyanosis nor edema.   Neurologic grossly intact with symmetric tone and strength and reflex.  Skin without gross abnormality     Results:     Lab Results   Component Value Date    WBC 10.4 04/28/2025    HGB 11.8 04/28/2025    HCT 37.3 04/28/2025    .0 04/28/2025    CREATSERUM 1.09 04/28/2025    BUN 31 04/28/2025     04/28/2025    K 4.2 04/28/2025     04/28/2025    CO2 31.0 04/28/2025     04/28/2025    CA 9.5 04/28/2025     Chest x-ray-Stable heart.  Persistent mild basilar opacities      Pneumomediastinum and subcutaneous gas, not significantly changed      Right  apical pneumothorax now 1.6 centimeter apex to pleural reflection      Left apical pneumothorax now 1 centimeter apex to pleural reflection     Nakul Cao MD  Medical Director, Critical Care, [facility]  Medical Director, [facility]  Pager: [phone]

## 2025-04-29 NOTE — CARDIAC REHAB
Cardiac Rehab Phase I    Activity:   Chair: Yes   Ambulation: Yes   Assistive Device: Walker   Distance: 200 feet   Assistance needed: Standby   Patient tolerated activity: Yes.

## 2025-04-29 NOTE — CM/SW NOTE
04/29/25 1200   Discharge disposition   Expected discharge disposition Home-Health   Post Acute Care Provider [facility]   Discharge transportation Private car     Pt discussed during nursing rounds. Pt is stable for dc today. MD dc order entered. [facility] will provide RN and therapy services at [place], liaison Marlen notified of dc home today. Pt's spouse will provide transport at [place].    1700: DC cancelled.    4/30/2025 at 0940: Pt cleared for dc today.    Plan: Home w/spouse with [facility] today.     / to remain available for support and/or discharge planning.     DAMION Hoyos    [phone]

## 2025-04-29 NOTE — PROGRESS NOTES
[facility]  part of [facility]    Progress Note    Carolina Montanez Patient Status:  Inpatient    1950 MRN [id number]   Location [facility] 2W/SW Attending Elizabeth Quintero MD   Hosp Day # 6 PCP Logan Hernandez MD     Subjective:  Pt sitting in the chair. He has no complaints today: denies pain and SOB and states he feels \"in better spirits today.\"   No visitors at bedside.     Objective:  /79 (BP Location: Left arm)   Pulse 96   Temp 97.8 °F (36.6 °C) (Temporal)   Resp 24   Ht 5' 9.02\" (1.753 m)   Wt 151 lb 7.3 oz (68.7 kg)   SpO2 96%   BMI 22.36 kg/m²     Temp (24hrs), Av.1 °F (36.7 °C), Min:97 °F (36.1 °C), Max:99 °F (37.2 °C)      Intake/Output:    Intake/Output Summary (Last 24 hours) at 2025 1013  Last data filed at 2025 0600  Gross per 24 hour   Intake 360 ml   Output 275 ml   Net 85 ml       Wt Readings from Last 3 Encounters:   25 151 lb 7.3 oz (68.7 kg)   25 159 lb 9.6 oz (72.4 kg)   25 159 lb (72.1 kg)       Allergies:  Allergies[1]    Labs:  Lab Results   Component Value Date    WBC 10.8 2025    HGB 11.6 2025    HCT 36.8 2025    .0 2025    CREATSERUM 1.08 2025    BUN 29 2025     2025    K 3.8 2025     2025    CO2 31.0 2025     2025    CA 9.5 2025       Physical Exam:  Blood pressure 126/79, pulse 96, temperature 97.8 °F (36.6 °C), temperature source Temporal, resp. rate 24, height 5' 9.02\" (1.753 m), weight 151 lb 7.3 oz (68.7 kg), SpO2 96%.  General: Left face check slightly puffy w/o crepitus felt.   Neck: + palpable Crepitus (about same or slightly less than yesterday)-neck area bilaterally/upper chest  Lungs: faint crackles bilateral bases  Heart: RRR, S1, S2  Abdomen: Soft/Round, NT/ND, BS+x4/+Flatus/+BM   Extremities: Warm, dry, no LE edema bilat  Pulses: 1+ bilat DP  Skin: sternotomy incision FAISAL=CDI/Left leg incision FAISAL=CDI    Neurological:  AAOx3, MAEW    Assessment/Plan:  S/P CABG x 3 POD # 6  Encourage pulm toilet: IS. Continues on room air gabrielle well. CXR reviewed-continues w/Bilateral pneumothoraces which have slightly decreased in size. + upper chest/neck crepitus noted/SubQ emphysema-about the same as previous day. Anticipate pneumothx resolving with time. No swallowing difficulties. Obtain CT neck/chest w/contrast today for further eval.   Pain meds as needed  Increase activity-OOB to chair and ambulate in hallway-showered- improved motivation w/activity.   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 Pt aware. Recommend further discussion with his PCP during his follow up appt   Discharge planning: pt lives with his wife. Plan for home w/HH visits when ready. Potentially ready later today vs tomorrow pending CT results. Discussed w/patient who verbalized understanding and agrees with plan.     Addendum @ 1500:   Reviewed CT results w/Surgeon. Esophagram performed w/o esophageal perforation ,stricture or lumen narrowing. Discussed with patient. He states he continues to feel good and would prefer to go home tomorrow. Will plan for d/c tomorrow.     Plan of care discussed w/patient/RN and CV Surgeon: Dr. Mary Levine, APRN  4/29/2025  10:13 AM         [1] No Known Allergies

## 2025-04-29 NOTE — OCCUPATIONAL THERAPY NOTE
OCCUPATIONAL THERAPY TREATMENT NOTE - INPATIENT        Room Number: 229/229-A     Presenting Problem: CABG    Problem List  Active Problems:    Preop testing    CAD (coronary artery disease)    Hx of CABG    Angina concurrent with and due to arteriosclerosis of CABG      OCCUPATIONAL THERAPY ASSESSMENT   Patient demonstrates good  progress this session, goals remain in progress.  Patient up and ambulatory >household distances with SBA; managing toileting tasks and toilet t/f with SBA; educated on activity pacing and energy conservation; pt is on track to d/c home with support; reinforced sternal precautions and education on activity pacing and energy conservation. Continue skilled occupational therapy while IP to maximize patient function and increase patient participation, safety, and independence with basic ADL and everyday activities.     Patient continues to benefit from continued skilled OT services: at discharge to promote prior level of function and safety with additional support and return home with home health OT.     PLAN DURING HOSPITALIZATION     OT Treatment Plan: ADL training, Functional transfer training, Patient/Family education, Patient/Family training, Equipment eval/education, Continued evaluation     SUBJECTIVE  I am going to have a party     OBJECTIVE  Precautions: Sternal, Cardiac    WEIGHT BEARING RESTRICTION     PAIN ASSESSMENT  Rating: Unable to rate  Location: back         ACTIVITIES OF DAILY LIVING ASSESSMENT  AM-[facility] ‘6-Clicks’ Inpatient Daily Activity Short Form  How much help from another person does the patient currently need…  -   Putting on and taking off regular lower body clothing?: A Lot  -   Bathing (including washing, rinsing, drying)?: A Little  -   Toileting, which includes using toilet, bedpan or urinal? : A Little  -   Putting on and taking off regular upper body clothing?: A Little  -   Taking care of personal grooming such as brushing teeth?: A Little  -   Eating meals?: A  Phylicia    AM-PAC Score:  Score: 17  Approx Degree of Impairment: 50.11%  Standardized Score (AM-PAC Scale): 37.26  CMS Modifier (G-Code): CK    FUNCTIONAL TRANSFER ASSESSMENT  Sit to Stand: Chair  Chair: Contact Guard Assist  Toilet Transfer: Contact Guard Assist    BED MOBILITY     BALANCE ASSESSMENT  Static Sitting: Stand-by Assist  Static Standing: Contact Guard Assist    FUNCTIONAL ADL ASSESSMENT  Eating: Supervision  Grooming Seated: Supervision  Bathing Seated: Minimal Assist  UB Dressing Seated: Minimal Assist  LB Dressing Seated: Minimal Assist  Toileting Seated: Stand-by Assist     The patient's Approx Degree of Impairment: 50.11% has been calculated based on documentation in the [facility] '6 clicks' Inpatient Daily Activity Short Form.  Research supports that patients with this level of impairment may benefit from HH.  Final disposition will be made by interdisciplinary medical team.    Patient End of Session: In bathroom - nursing staff aware    OT Goals:      OT Goals  Patients self stated goal is: none stated     Patient will complete functional transfer with SBA  Comment: ongoing    Patient will complete toileting with SBA  Comment: ongoing     Patient will tolerate standing for 4 minutes in prep for adls with SBA   Comment:    Patient will complete item retrieval with SBA  Comment:          Goals  on: 25  Frequency: 3-5x/week      OT Session Time: 23 minutes  Self-Care Home Management: 0 minutes  Therapeutic Activity: 23 minutes

## 2025-04-29 NOTE — PROGRESS NOTES
Cardiology Progress Note    Carolina Montanez Patient Status:  Inpatient    1950 MRN [id number]   Location [facility] 2W/SW Attending Elizabeth Quintero MD   Hosp Day # 6 PCP Logan Hernandez MD     Interval Note:  Seen and examined  Feels well, no other complaints  Discomfort secondary to subcutaneous emphysema improved, plan for CT today    --------------------------------------------------------------------------------------------------------------------------------  ROS 12 systems reviewed, pertinent findings above.  ROS    History:  Past Medical History[1]  Past Surgical History[2]  Family History[3]   reports that he has never smoked. He has never used smokeless tobacco. He reports that he does not drink alcohol and does not use drugs.    Objective:   Temp: 97.8 °F (36.6 °C)  Pulse: 96  Resp: 24  BP: 126/79    Intake/Output:     Intake/Output Summary (Last 24 hours) at 2025 0913  Last data filed at 2025 0600  Gross per 24 hour   Intake 360 ml   Output 275 ml   Net 85 ml       Physical Exam:    General: AO x 3, no acute distress  HEENT: Normocephalic, anicteric sclera, neck supple.  Neck: No JVD, carotids 2+, no bruits.  Fullness around left face, neck  Cardiac: Regular rate and rhythm. S1, S2 normal. No murmur, pericardial rub, S3.  Lungs: Clear without wheezes, rales, rhonchi or dullness.  Normal excursions and effort.  Abdomen: Soft, non-tender. BS-present.  Extremities: Without clubbing, cyanosis or edema.  Peripheral pulses are 2+.  Neurologic: Non-focal  Skin: Warm and dry.       Assessment   Unstable angina, severe multivessel coronary disease  S/p CABG X3 (LIMA-LAD, VG-OM, VG-PDA) 25  History of sinus bradycardia status post PPM  Postoperative bilateral  pneumothoraces and pneumomediastinum, subcutaneous emphysema        Plan  Patient seen and examined  Vitals at goal  Feels well, no specific complaints  Plan for CT today to evaluate pneumothoraces/ pneumomediastinum  Continue  aspirin, statin, Plavix, low-dose Lasix and metoprolol  Anticipate discharge later today if CT scans normal.  Recommend close follow-up in cardiology clinic  Outpatient cardiac rehab    Thank you for allowing me to take part in the care of Carolina Montanez. Please call with any questions of concerns.        Level of care: L4    Leandro Bob DO  [facility]   Interventional Cardiac and Vascular Services      Leandro Bob   April 29, 2025  9:13 AM         [1]   Past Medical History:   Acid reflux    Age-related nuclear cataract of both eyes    Back problem    CAD (coronary artery disease)    COPD (chronic obstructive pulmonary disease) (HCC)    Coronary atherosclerosis    Decreased lung capacity    Deviated septum    Esophageal reflux    Floaters, right    Fractured nasal bones    Hearing difficulty    High blood pressure    High cholesterol    Macular degeneration    POAG (primary open-angle glaucoma)    Started Latanoprost in both eyes at bedtime- RJM    Shortness of breath    with exertion    Tubular adenoma of colon    polyps removed; if interested in continuing screening, next in 2029    Visual impairment    Glasses   [2]   Past Surgical History:  Procedure Laterality Date    Angiogram      Was told he had a blockage     Cardiac pacemaker placement      [employer] per patient    Colonoscopy      [facility] about 5 yrs ago    Colonoscopy N/A 08/20/2019    Procedure: COLONOSCOPY;  Surgeon: IRINEO Villasenor MD;  Location: [facility] ENDOSCOPY    Colonoscopy N/A 4/23/2024    Procedure: COLONOSCOPY;  Surgeon: IRINEO Villasenor MD;  Location: [facility] ENDOSCOPY    Egd      Other surgical history      nasal surgery   [3]   Family History  Problem Relation Age of Onset    Diabetes Father     Cancer Sister         stomach cancer    No Known Problems Sister     Cancer Brother     Glaucoma Neg     Macular degeneration Neg

## 2025-04-29 NOTE — PROGRESS NOTES
Progress Note     Carolina Montanez Patient Status:  Inpatient    1950 MRN [id number]   Location [facility] 2W/SW Attending Eulalio Hernandez MD   Hosp Day # 6 PCP Logan Hernandez MD     Chief Complaint: Patient is doing well.       Subjective:   S: Patient is resting in the chair. No new complaints.     Review of Systems:   10 point ROS completed and was negative, except for pertinent positive and negatives stated in subjective.    Objective:   Vital signs:  Temp:  [97 °F (36.1 °C)-98.7 °F (37.1 °C)] 97.5 °F (36.4 °C)  Pulse:  [79-98] 89  Resp:  [13-24] 18  BP: ()/(56-98) 110/98  SpO2:  [94 %-99 %] 97 %    Wt Readings from Last 6 Encounters:   25 151 lb 7.3 oz (68.7 kg)   25 159 lb 9.6 oz (72.4 kg)   25 159 lb (72.1 kg)   24 161 lb (73 kg)   24 160 lb (72.6 kg)   23 168 lb (76.2 kg)         Physical Exam:    General: No acute distress. Alert ,         Respiratory: Clear to auscultation bilaterally. No wheezes. No rhonchi.  Cardiovascular: S1, S2. Regular rate and rhythm. No murmurs, rubs or gallops.   Abdomen: Soft, nontender, nondistended.  Positive bowel sounds. No rebound or guarding.  Neurologic: No focal neurological deficits.   Musculoskeletal: Moves all extremities.  Extremities: No edema.    Results:   Diagnostic Data:      Labs:    Labs Last 24 Hours:   BMP     CBC    Other     Na 142 Cl 103 BUN 29 Glu 127   Hb 11.6   PTT - Procal -   K 3.8 CO2 31.0 Cr 1.08   WBC 10.8 >< .0  INR - CRP -   Renal Lytes Endo    Hct 36.8   Trop - D dim -   eGFR - Ca 9.5 POC Gluc  225    LFT   pBNP - Lactic -   eGFR AA - PO4 - A1c -   AST - APk - Prot -  LDL -     Mg - TSH -   ALT - T cameron - Alb -        COVID-19 Lab Results    COVID-19  Lab Results   Component Value Date    COVID19 Detected (A) 2021       Pro-Calcitonin  No results for input(s): \"PCT\" in the last 168 hours.    Cardiac  No results for input(s): \"TROP\", \"PBNP\" in the last 168 hours.    Creatinine  Kinase  No results for input(s): \"CK\" in the last 168 hours.    Inflammatory Markers  No results for input(s): \"CRP\", \"CARY\", \"LDH\", \"DDIMER\" in the last 168 hours.    Imaging: Imaging data reviewed in Epic.    Medications: Scheduled Medications[1]    Assessment & Plan:   ASSESSMENT / PLAN:       Coronary artery disease status post CABG x 3 on April 23  Blood pressure stable  On nitroglycerin and dobutamine-discontinue  Cardiothoracic surgery and cardiology following  Continue aspirin, Plavix, Lipitor, Metoprolol   Discharge planning per CV,  plans home with home health once improved.    PTX/Subcutaneous Emphesema   - CT of the neck reviewed showing extensive sub emphysema  - Esophagram today  - if test comes back normal, probable discharge today   - monitor O2 sats for now     Hypertension  Continue metoprolol     Hyperlipidemia  Continue Zetia and statins        Quality:  DVT Prophylaxis: Lovenox  CODE status:  FULL  DISPO: pending clinical improvement.   Estimated date of discharge: To be determined  Discharge is dependent on: Improved clinical status  At this point Patient is expected to be discharge to: Home versus rehab      Plan of care discussed with Patient and RN.     Coordinated care with providers and counseling re: treatment plan and workup    Elizabeth Quintero MD    Supplementary Documentation:         **Certification      PHYSICIAN Certification of Need for Inpatient Hospitalization - Initial Certification    Patient will require inpatient services that will reasonably be expected to span two midnight's based on the clinical documentation in H+P.   Based on patients current state of illness, I anticipate that, after discharge, patient will require TBD.             I personally reviewed the available laboratories, imaging including operative report. I discussed/will discuss the case with patient and her nurse. I ordered laboratories studies. I adjusted medications including not applicable today. Medical  decision making high, risk is high.     >55min spent, >50% spent counseling and coordinating care in the form of educating pt/family and d/w consultants and staff. Most of the time spent discussing the above plan.                 [1]    furosemide  20 mg Oral Daily    colchicine  0.6 mg Oral Daily    ezetimibe  10 mg Oral Nightly    atorvastatin  80 mg Oral Nightly    sennosides  8.6 mg Oral BID    docusate sodium  100 mg Oral BID    metoclopramide  10 mg Intravenous Q6H    famotidine  20 mg Oral BID    metoprolol tartrate  25 mg Oral 2x Daily(Beta Blocker)    chlorhexidine gluconate  15 mL Mouth/Throat BID    multivitamin  1 tablet Oral Daily    ascorbic acid  500 mg Oral TID    enoxaparin  40 mg Subcutaneous Daily    clopidogrel  75 mg Oral Daily    aspirin  81 mg Oral Daily

## 2025-04-29 NOTE — PROGRESS NOTES
[facility]  part of [facility]    Progress Note      Assessment and Plan:   1.  Status post coronary artery bypass grafting-postoperative day #6.  The patient is overall doing acceptably.  There is minimal dyspnea.    The patient continues to do fairly well.  He yet has very small apical pneumothoraces but they are getting smaller.  Ongoing subcutaneous emphysema.    Recommendations:  1.  Metoprolol, clopidogrel, aspirin, atorvastatin  2.  Aggressive antipain, antithrombosis and antiatelectasis measures.  3.  As per CV surgery and cardiology  4.  Will follow clinically.     2.  DVT prophylaxis-Lovenox    3.  Suspected PPS-colchicine initiated.  Sed rate 49.        Subjective:   Carolina Montanez is a(n) 74 year old male who is wakeful and out of bed without complaint    Objective:   Blood pressure (!) 110/98, pulse 89, temperature 97.5 °F (36.4 °C), temperature source Temporal, resp. rate 18, height 5' 9.02\" (1.753 m), weight 151 lb 7.3 oz (68.7 kg), SpO2 97%.    Physical Exam alert male  HEENT examination is unremarkable with pupils equal round and reactive to light and accommodation.   Neck without adenopathy, thyromegaly, JVD nor bruit.   Lungs slightly diminished to auscultation and percussion.  Cardiac regular rate and rhythm no murmur.   Abdomen nontender, without hepatosplenomegaly and no mass appreciable.   Extremities without clubbing cyanosis nor edema.   Neurologic grossly intact with symmetric tone and strength and reflex.  Skin without gross abnormality     Results:     Lab Results   Component Value Date    WBC 10.8 04/29/2025    HGB 11.6 04/29/2025    HCT 36.8 04/29/2025    .0 04/29/2025    CREATSERUM 1.08 04/29/2025    BUN 29 04/29/2025     04/29/2025    K 3.8 04/29/2025     04/29/2025    CO2 31.0 04/29/2025     04/29/2025    CA 9.5 04/29/2025     Chest x-ray-Stable heart.  Persistent mild basilar opacities      Pneumomediastinum and subcutaneous gas, not  significantly changed      Right apical pneumothorax now 1.6 centimeter apex to pleural reflection      Left apical pneumothorax now 1 centimeter apex to pleural reflection     Nakul Cao MD  Medical Director, Critical Care, [facility]  Medical Director, [facility]  Pager: [phone]

## 2025-04-29 NOTE — PLAN OF CARE
Problem: Patient Centered Care  Goal: Patient preferences are identified and integrated in the patient's plan of care  Description: Interventions:- What would you like us to know as we care for you?  I have two daughters- Provide timely, complete, and accurate information to patient/family- Incorporate patient and family knowledge, values, beliefs, and cultural backgrounds into the planning and delivery of care- Encourage patient/family to participate in care and decision-making at the level they choose- Honor patient and family perspectives and choices  Outcome: Progressing     Problem: Diabetes/Glucose Control  Goal: Glucose maintained within prescribed range  Description: INTERVENTIONS:- Monitor Blood Glucose as ordered- Assess for signs and symptoms of hyperglycemia and hypoglycemia- Administer ordered medications to maintain glucose within target range- Assess barriers to adequate nutritional intake and initiate nutrition consult as needed- Instruct patient on self management of diabetes  Outcome: Progressing     Problem: Patient/Family Goals  Goal: Patient/Family Long Term Goal  Description: Patient's Long Term Goal: Interventions:- - See additional Care Plan goals for specific interventions  Outcome: Progressing  Goal: Patient/Family Short Term Goal  Description: Patient's Short Term Goal: Interventions: - - See additional Care Plan goals for specific interventions  Outcome: Progressing     Problem: CARDIOVASCULAR - ADULT  Goal: Maintains optimal cardiac output and hemodynamic stability  Description: INTERVENTIONS:- Monitor vital signs, rhythm, and trends- Monitor for bleeding, hypotension and signs of decreased cardiac output- Evaluate effectiveness of vasoactive medications to optimize hemodynamic stability- Monitor arterial and/or venous puncture sites for bleeding and/or hematoma- Assess quality of pulses, skin color and temperature- Assess for signs of decreased coronary artery perfusion - ex. Angina-  Evaluate fluid balance, assess for edema, trend weights  Outcome: Progressing  Goal: Absence of cardiac arrhythmias or at baseline  Description: INTERVENTIONS:- Continuous cardiac monitoring, monitor vital signs, obtain 12 lead EKG if indicated- Evaluate effectiveness of antiarrhythmic and heart rate control medications as ordered- Initiate emergency measures for life threatening arrhythmias- Monitor electrolytes and administer replacement therapy as ordered  Outcome: Progressing     Problem: RESPIRATORY - ADULT  Goal: Achieves optimal ventilation and oxygenation  Description: INTERVENTIONS:- Assess for changes in respiratory status- Assess for changes in mentation and behavior- Position to facilitate oxygenation and minimize respiratory effort- Oxygen supplementation based on oxygen saturation or ABGs- Provide Smoking Cessation handout, if applicable- Encourage broncho-pulmonary hygiene including cough, deep breathe, Incentive Spirometry- Assess the need for suctioning and perform as needed- Assess and instruct to report SOB or any respiratory difficulty- Respiratory Therapy support as indicated- Manage/alleviate anxiety- Monitor for signs/symptoms of CO2 retention  Outcome: Progressing     Problem: Integumentary status not within defined limits  Goal: Pt's integumentary status will be adequate for discharge  Outcome: Progressing     Problem: PAIN - ADULT  Goal: Verbalizes/displays adequate comfort level or patient's stated pain goal  Description: INTERVENTIONS:- Encourage pt to monitor pain and request assistance- Assess pain using appropriate pain scale- Administer analgesics based on type and severity of pain and evaluate response- Implement non-pharmacological measures as appropriate and evaluate response- Consider cultural and social influences on pain and pain management- Manage/alleviate anxiety- Utilize distraction and/or relaxation techniques- Monitor for opioid side effects- Notify MD/LIP if interventions  unsuccessful or patient reports new pain- Anticipate increased pain with activity and pre-medicate as appropriate  Outcome: Progressing   Pt A&O x3, pleasant and cooperative with care, visiting with family on pms.,vss thru nite, up to br with walker, steady on feet, voiding sm amts clear skip urine.  Denies c/o pain, has sm amt of subcutaneous emphysema bilat neck, no trouble swallowing meds.  Took melatonin for sleep and slept in recliner for night.

## 2025-04-29 NOTE — CARDIAC REHAB
Cardiac Rehab Phase I    Activity:   Chair: Yes   Ambulation: Ambulated with OT prior to RN visit   Assistive Device: None   Shower Date: 4/28/25 Tolerated Shower Activity: Yes    Education:  Handouts provided and reviewed: Heart Surgery Binder. :Referred to Cardiac Rehabilitation Phase 2.  Patient instructed on: I.S. / Acapella, Cough and Deep Breathe, Incision Care, and Infection Prevention.     Diet: Healthy Cardiac diet reviewed.    Disease Process: Disease process reviewed.    Weight Monitoring: Instructed on daily weights.    I.S. and/or Acapella: Patient instructed on incentive spirometer and/or acapella with good return demonstration.    Infection: Reviewed signs and symptoms of infection. Infection prevention and when to notify MD.

## 2025-04-30 NOTE — DISCHARGE SUMMARY
[facility] Discharge Summary   Patient ID:  Carolina Montanez  [id number]  74 year old  5/2/1950    Admit date: 4/23/2025  Discharge date: 4/30/2025  Primary Care Physician: Logan Hernandez MD   Attending Physician: No att. providers found   Consults:   Consultants         Provider   Role Specialty     Mady Hamlin MD      Consulting Physician HOSPITALIST     Nakul Cao MD      Consulting Physician PULMONARY DISEASES     Miladys Torres MD      Consulting Physician HOSPITALIST     Leandro Bob DO      Consulting Physician Interventional, Cardiology            Discharge Diagnoses:   Unstable angina   Severe multivessel CAD s/p CABG x 3v    Reason for admission  Copied from admission H&P: please refer to preop H&P     Hospital Course:    Unstable angina   Severe multivessel CAD s/p CABG x 3v  - Cards, CV surgery and critical care on consult.   - s/p CABG x 3 v POD #7  - Ambulating halls, doing well pain controlled.   - CXR/CT/Esophagram reviewed continues with B/L pneumothoraces slightly decreased in size with improved subcutaneous emphysema. Anticipate will resolved with time no swallowing difficulty.   - augmentin empirically on DC x 14 days secondary to pneumomediastinum per CV surgery   - ASA, lipitor, plavix, zetia, metoprolol on DC     HTN   Hyperlipidemia  - cont metoprolol and zetia/statin     EXAM:   GENERAL: no apparent distress, comfortable  NEURO: A/A Ox3, no focal deficits  RESP: non labored, CTAB/L  CARDIO: Regular, no murmur  ABD: soft, NT, ND  EXTREMITIES: no edema, no calf tenderness    Operative Procedures: Procedure(s) (LRB):  CORONARY ARTERY BYPASS GRAFT X 3; UTILIZING THE LEFT INTERNAL MAMMARY ARTERY TO THE LAD; SVG TO THE OM ; SVG TO THE PDA; LEFT LEG ENDOSCOPIC GREATER SAPHENOUS VEIN GRAFT HARVEST; INTRAOPERATIVE TRANSESOPHAGEAL ECHOCARDIOGRAM; INSERTION OF TEMPORARY VENTRICULAR PACING WIRE; (N/A)    Discharge Instructions     Medication List        START taking these  medications      amoxicillin clavulanate 875-125 MG Tabs  Commonly known as: Augmentin  Take 1 tablet (875 mg total) by mouth every 12 (twelve) hours for 14 days.     clopidogrel 75 MG Tabs  Commonly known as: Plavix  Take 1 tablet (75 mg total) by mouth daily.     colchicine 0.6 MG Tabs  Take 1 tablet (0.6 mg total) by mouth daily for 14 days.  Start taking on: May 1, 2025     famotidine 20 MG Tabs  Commonly known as: Pepcid  Take 1 tablet (20 mg total) by mouth daily.     furosemide 20 MG Tabs  Commonly known as: Lasix  Take 1 tablet (20 mg total) by mouth daily.     metoprolol succinate ER 50 MG Tb24  Commonly known as: Toprol XL  Take 1 tablet (50 mg total) by mouth Daily Beta Blocker.  Start taking on: May 1, 2025            CHANGE how you take these medications      atorvastatin 80 MG Tabs  Commonly known as: Lipitor  Take 1 tablet (80 mg total) by mouth nightly.  What changed:   medication strength  how much to take  when to take this     ezetimibe 10 MG Tabs  Commonly known as: Zetia  Take 1 tablet (10 mg total) by mouth daily.  What changed: when to take this            CONTINUE taking these medications      aspirin 81 MG Tbec     CALTRATE 600+D OR     montelukast 10 MG Tabs  Commonly known as: Singulair  Take 1 tablet (10 mg total) by mouth nightly.     PROBIOTIC DAILY OR            STOP taking these medications      lisinopril 10 MG Tabs  Commonly known as: Zestril               Where to Get Your Medications        These medications were sent to [id number] - [place], [place] - [address] [place] [address] [phone], [phone]  [address] [place] [id number]      Phone: [phone]   amoxicillin clavulanate 875-125 MG Tabs  atorvastatin 80 MG Tabs  clopidogrel 75 MG Tabs  colchicine 0.6 MG Tabs  famotidine 20 MG Tabs  furosemide 20 MG Tabs  metoprolol succinate ER 50 MG Tb24         Activity: activity as tolerated  Diet: cardiac diet  Wound Care: NA  Code Status: Full  Code        Discharge Instructions         CARDIAC SURGERY DISCHARGE INSTRUCTIONS  Shower daily and clean your surgical incisions with soap and water then swab with Betadine 2 x day until your follow up office visit with Dr. Hernandez's office.  Do not drive for one month  Do not lift/push/pull greater than 10 lbs for 3 months   Do not soak (submerge) your incision in water such as tub/pool/etc for one month  Take Plavix 75 mg daily with Aspirin 81 mg daily for 3 months. After 3 months- stop the Plavix and continue on Aspirin 81 mg daily.    Sometimes managing your health at home requires assistance.  The [facility] team has recognized your preference to use [facility].  They can be reached by phone at [phone].  The fax number for your reference is [phone].  A representative from the home health agency will contact you or your family to schedule your first visit.             Important follow up:   Follow-up Information       Yanna Katz Follow up on 5/6/2025.    Why: Hospital follow-up appointment @ 9am  Contact information:  [facility]  [address] [place] [address] [address]  [place] [id number]  [phone]             Eulalio Hernandez MD Follow up on 5/14/2025.    Specialty: Surgery, Thoracic  Why: Hospital follow-up appointment @ 2pm  Contact information:  [address] [facility]  [address]  [place] [id number]  [phone]               Leandro Bob DO Follow up on 5/23/2025.    Specialty: Interventional, Cardiology  Why: Hospital follow-up appointment @ 11:10am  Contact information:  [address] [facility]  [address]  [place] [id number]  [phone]               Logan Hernandez MD Follow up on 5/27/2025.    Specialty: Internal Medicine  Why: Hospital follow-up appointment @ 10:15am  Contact information:  [address] [place] [id number]  [phone]                             -PCP in [] within 7 days [] within 14 days [] other     Disposition: home  Discharged Condition:  good    Hospital Discharge Diagnoses:  unstable angina    Lace+ Score: 56  59-90 High Risk  29-58 Medium Risk  0-28   Low Risk.    TCM Follow-Up Recommendation:  LACE > 58: High Risk of readmission after discharge from the hospital.            Total Time Coordinating Care: Greater than 30 minutes    Patient had opportunity to ask questions, state understanding, and agree with therapeutic plan as outlined    Alex Peterson MD  Hospitalist  4/30/2025

## 2025-04-30 NOTE — CARDIAC REHAB
Cardiac Rehab Phase I    Education:  Handouts provided and reviewed: Heart Surgery Binder. Referred to Cardiac Rehabilitation Phase 2: cardiac rehab appointment scheduled.  Patient instructed on: I.S. / Acapella, Cough and Deep Breathe, Incision Care, and Infection Prevention.     Diet: Healthy Cardiac diet reviewed.    Disease Process: Disease process reviewed.    Weight Monitoring: Instructed on daily weights.    I.S. and/or Acapella: Patient instructed on incentive spirometer and/or acapella with good return demonstration.    Infection: Reviewed signs and symptoms of infection. Infection prevention and when to notify MD.

## 2025-04-30 NOTE — PROGRESS NOTES
Daily Pulmonary/ICU/Critical Care Progress Note          SUBJECTIVE:  Doing well  Neck is better      ALLERGIES:  Allergies[1]      MEDS:  Home Medications:  Medications Taking[2]  Scheduled Medication:  Scheduled Medications[3]  Continuous Infusing Medication:  Medication Infusions[4]  PRN Medications:  PRN Medications[5]       PHYSICAL EXAM:  /74 (BP Location: Left arm)   Pulse 86   Temp 97.8 °F (36.6 °C) (Temporal)   Resp 18   Ht 5' 9.02\" (1.753 m)   Wt 149 lb 11.1 oz (67.9 kg)   SpO2 99%   BMI 22.10 kg/m²      CONSTITUTIONAL: alert, oriented, no apparent distress  HEENT: atraumatic normocephalic  MOUTH: mucous membranes are moist. No OP exudates  NECK/THROAT: no JVD. Trachea midline. Neck region swelling and crepitus is improving   LUNG: clear upper b/l no wheezing, crackles. Diminished bases. Chest symmetric with respiratory motion  HEART: regular rate and rhythm, no obvious murmers or gallops noted  ABD: soft non tender. + bowel sounds. No organomegaly noted  EXT: no clubbing, cyanosis. Trace LE edema noted      IMAGES:   Reviewed in EMR  CT chest 4/29/25  CONCLUSION:   1. Extensive pneumomediastinum with associated subcutaneous emphysema throughout the chest wall and neck bilaterally.  Subcutaneous emphysema tracks superiorly to the left greater than right  spaces.  There is also retropharyngeal extension of   subcutaneous emphysema.  Associated small left and trace right apical pneumothoraces.  There is also a small right anterior pneumothorax.   2. Small bilateral pleural effusions.  Probable associated compressive atelectasis in both lower lobes.   3. Small 5 mm ground-glass density nodule in the right upper lobe with other scattered reticulonodular opacities.  Findings probably relate to small airways infection (of any chronicity).   4. Coronary artery calcification with coronary artery bypass and left chest wall pacemaker.  There is also mild left carotid bifurcation  atherosclerosis.  Mild retrosternal scarring with no associated well-defined/drainable collection or hematoma.   5. Acute minimally displaced left lateral 1st rib fracture.   6. Other chronic right posterior rib fractures.  There is also a mild chronic T3 vertebral body compression fracture.   7. Lesser incidental findings as above.       CXR 4/26/25  CONCLUSION:   1. Left apical pneumothorax measuring between 2 and 3 cm from the apex.   2. Subcutaneous emphysema extending into the neck.  Mild pneumomediastinum.   3. Stable mild cardiomegaly.  Post coronary artery bypass.   4. Dual-chamber pacemaker.   5. Perihilar and basilar atelectasis.   6. Perfect serve message sent to [facility] Orte[facility]     CXR 4/25/25  Unchanged mild basilar opacities likely atelectasis and effusion; persistent small vague opacities in the mid to lower lungs likely foci of atelectasis       LABS:  Recent Labs   Lab 04/24/25  0538 04/26/25  0400 04/27/25  0438 04/28/25  0516 04/29/25  0440 04/30/25  0421   RBC 3.30* 3.68*   < > 4.03 3.98 3.80   HGB 9.9* 11.2*   < > 11.8* 11.6* 11.1*   HCT 29.7* 33.8*   < > 37.3* 36.8* 35.1*   MCV 90.0 91.8   < > 92.6 92.5 92.4   MCH 30.0 30.4   < > 29.3 29.1 29.2   MCHC 33.3 33.1   < > 31.6 31.5 31.6   RDW 13.9 14.4   < > 13.9 13.8 14.0   NEPRELIM 10.41* 9.59*  --   --   --   --    WBC 11.8* 13.4*   < > 10.4 10.8 11.0   .0* 111.0*   < > 143.0* 183.0 181.0    < > = values in this interval not displayed.       Recent Labs   Lab 04/28/25  0516 04/29/25  0440 04/30/25  0421   * 127* 114*   BUN 31* 29* 26*   CREATSERUM 1.09 1.08 1.06   EGFRCR 70 71 73   CA 9.5 9.5 9.2    142 140   K 4.2 3.8 4.1  4.1    103 103   CO2 31.0 31.0 30.0     ASSESSMENT/PLAN:  S/p CABG x 3 for CAD  -extubated   -pain control  -push IS  -chest tubes and swan removed on 4/26/25 as per CV  -not on cardiac gtts  -on asa, statin, plavix, bb    Left PTX, pneumomediastinum  -developed on evening of 4/26/25   -has  subcutaneous air. Not obvious PTX on my review of CXR this am  -hemodynamically stable. So will continue to follow  -overall improving    HTN  -bb    HLP  -statin and zetia    Proph  -DVT: lovenox    Dispo  -full code      Thank you for the opportunity to care for Carolina Turpin Tarik Quiñones DO, MPH  Pulmonary Critical Care Medicine  [facility]          [1] No Known Allergies  [2]   Outpatient Medications Marked as Taking for the 4/23/25 encounter (Hospital Encounter)   Medication Sig Dispense Refill    amoxicillin clavulanate 875-125 MG Oral Tab Take 1 tablet (875 mg total) by mouth every 12 (twelve) hours for 14 days. 28 tablet 0    atorvastatin 80 MG Oral Tab Take 1 tablet (80 mg total) by mouth nightly. 30 tablet 0    clopidogrel (PLAVIX) 75 MG Oral Tab Take 1 tablet (75 mg total) by mouth daily. 30 tablet 0    furosemide 20 MG Oral Tab Take 1 tablet (20 mg total) by mouth daily. 30 tablet 0    metoprolol tartrate 25 MG Oral Tab Take 1 tablet (25 mg total) by mouth 2x Daily(Beta Blocker). 60 tablet 0    lisinopril 10 MG Oral Tab Take 1 tablet (10 mg total) by mouth at bedtime.      atorvastatin 20 MG Oral Tab Take 1 tablet (20 mg total) by mouth daily. (Patient taking differently: Take 1 tablet (20 mg total) by mouth nightly.) 90 tablet 3    ezetimibe 10 MG Oral Tab Take 1 tablet (10 mg total) by mouth daily. (Patient taking differently: Take 1 tablet (10 mg total) by mouth nightly.) 90 tablet 3    montelukast 10 MG Oral Tab Take 1 tablet (10 mg total) by mouth nightly. 90 tablet 3    aspirin 81 MG Oral Tab EC Take 1 tablet (81 mg total) by mouth at bedtime.      Calcium Carbonate-Vitamin D (CALTRATE 600+D OR) Take by mouth nightly.      Probiotic Product (PROBIOTIC DAILY OR) Take by mouth at bedtime.     [3]    amoxicillin potassium and clavulanate  875 mg Oral Q12H    furosemide  20 mg Oral Daily    colchicine  0.6 mg Oral Daily    ezetimibe  10 mg Oral  Nightly    atorvastatin  80 mg Oral Nightly    sennosides  8.6 mg Oral BID    docusate sodium  100 mg Oral BID    metoclopramide  10 mg Intravenous Q6H    famotidine  20 mg Oral BID    metoprolol tartrate  25 mg Oral 2x Daily(Beta Blocker)    chlorhexidine gluconate  15 mL Mouth/Throat BID    multivitamin  1 tablet Oral Daily    ascorbic acid  500 mg Oral TID    enoxaparin  40 mg Subcutaneous Daily    clopidogrel  75 mg Oral Daily    aspirin  81 mg Oral Daily   [4] [5]   zolpidem    melatonin    polyethylene glycol (PEG 3350)    bisacodyl    ondansetron    potassium chloride **OR** potassium chloride    magnesium sulfate in dextrose 5%    magnesium sulfate in sterile water for injection    acetaminophen **OR** HYDROcodone-acetaminophen **OR** HYDROcodone-acetaminophen    glucose **OR** glucose **OR** glucose-vitamin C **OR** dextrose **OR** glucose **OR** glucose **OR** glucose-vitamin C

## 2025-04-30 NOTE — SPIRITUAL CARE NOTE
Spiritual Care Visit Note    Patient Name: Carolina Montanez Date of Spiritual Care Visit: 25   : 1950 Primary Dx: <principal problem not specified>       Referred By: Referral From: Care Coordination    Spiritual Care Taxonomy:    Intended Effects: Build relationship of care and support    Methods: Collaborate with care team member, Offer emotional support, Offer spiritual/Judaism support, Offer support, Accompany someone in their spiritual/Judaism practice outside your amanda tradition, Encourage self care, Encourage self reflection, Encourage sharing of feelings    Interventions: Acknowledge current situation, Acknowledge response to difficult experience, Active listening, Explain  role, Provide hospitality, Silent prayer    Visit Type/Summary:     - Spiritual Care: Responded to a request for spiritual care and assessed patient for spiritual care needs. Consulted with RN prior to visit. Offered empathic listening and emotional support. Patient expressed appreciation for  visit. Provided information regarding how to contact Spiritual Care and left a Spiritual Care information card. Patient is happy to go home today. He is thankful for the attention and treatment and the second chance to live after surgery. He has family support.  remains available as needed for follow up.    Spiritual Care support can be requested via an Epic consult. For urgent/immediate needs, please contact the On Call  at: [place]: ext [phone]    Catrachito VALENTIN  Chaplain Resident  [phone]

## 2025-04-30 NOTE — PROGRESS NOTES
[facility]  part of [facility]    Progress Note    Carolina Montanez Patient Status:  Inpatient    1950 MRN [id number]   Location [facility] 2W/SW Attending Elizabeth Quintero MD   Hosp Day # 7 PCP Logan Hernandez MD     Subjective:  OOB in chair on exam, feeling much better today.  Feels ready to go home today.  He currently denies: CP, SOB, palpitations, or dizziness.  No visitors at bedside currently    Objective:  /86 (BP Location: Left arm)   Pulse 97   Temp 97.4 °F (36.3 °C) (Temporal)   Resp 17   Ht 5' 9.02\" (1.753 m)   Wt 149 lb 11.1 oz (67.9 kg)   SpO2 98%   BMI 22.10 kg/m²     Temp (24hrs), Av.5 °F (36.4 °C), Min:97.4 °F (36.3 °C), Max:97.6 °F (36.4 °C)      Intake/Output:    Intake/Output Summary (Last 24 hours) at 2025 0741  Last data filed at 2025 0500  Gross per 24 hour   Intake 100 ml   Output 150 ml   Net -50 ml       Wt Readings from Last 3 Encounters:   25 149 lb 11.1 oz (67.9 kg)   25 159 lb 9.6 oz (72.4 kg)   25 159 lb (72.1 kg)       Allergies:  Allergies[1]    Labs:  Lab Results   Component Value Date    WBC 11.0 2025    HGB 11.1 2025    HCT 35.1 2025    .0 2025    CREATSERUM 1.06 2025    BUN 26 2025     2025    K 4.1 2025    K 4.1 2025     2025    CO2 30.0 2025     2025    CA 9.2 2025       Physical Exam:  Blood pressure 118/86, pulse 97, temperature 97.4 °F (36.3 °C), temperature source Temporal, resp. rate 17, height 5' 9.02\" (1.753 m), weight 149 lb 11.1 oz (67.9 kg), SpO2 98%.  General: Left face check slightly puffy w/o crepitus felt -much improved  Neck: + palpable Crepitus -improved  Lungs: faint crackles bilateral bases  Heart: RRR, S1, S2  Abdomen: Soft/Round, NT/ND, BS+x4/+Flatus/+BM   Extremities: Warm, dry, no LE edema bilat  Pulses: 1+ bilat DP  Skin: sternotomy incision FAISAL=CDI/Left leg incision FAISAL=CDI    Neurological:  AAOx3, MAEW      Assessment/Plan:  S/P CABG x 3 POD # 7  Encourage pulm toilet: IS.  Stable on RA. CT/esophagram/CXR reviewed by Dr. Hernandez-continues w/Bilateral pneumothoraces which have slightly decreased in size. + upper chest/neck crepitus noted/SubQ emphysema-much improved.  Anticipate pneumothx resolving with time. No swallowing difficulties.   Pain meds as needed  Increase activity-OOB to chair and ambulate in hallway-showered yesterday- improved motivation w/activity.   Scds/Lovenox prophylaxis DVT prevention   Expected post op volume overload: on Lasix daily   Expected post op anemia w/o clinical significance/stable  Esophagram performed without esophageal perforation, stricture, or lumen narrowing.  Eating without difficulties.  Will continue Augmentin x 14 days secondary to pneumomediastinum  Post op ESR=49-suspect PPS- continue Colchicine   Hx: Thyroid nodule which has increased since 2022 as noted on his pre op carotid ultrasound. Pt aware. Recommend further discussion with his PCP during his follow up appt     Discharge planning: pt lives with his wife. Plan for home w/HH visits today. Discussed w/patient who verbalized understanding and agrees with plan.  IL  reviewed prior to narcotic prescription being written.  Reviewed postop CV DC instructions, follow-up appointments as directed      Plan of care discussed w/patient/RN and CV Surgeon: Dr. Mary Fuentes, APRN  4/30/25  0745         [1] No Known Allergies

## 2025-04-30 NOTE — PLAN OF CARE
Problem: Patient Centered Care  Goal: Patient preferences are identified and integrated in the patient's plan of care  Description: Interventions:- What would you like us to know as we care for you?  I have two daughters- Provide timely, complete, and accurate information to patient/family- Incorporate patient and family knowledge, values, beliefs, and cultural backgrounds into the planning and delivery of care- Encourage patient/family to participate in care and decision-making at the level they choose- Honor patient and family perspectives and choices  Outcome: Adequate for Discharge     Problem: Diabetes/Glucose Control  Goal: Glucose maintained within prescribed range  Description: INTERVENTIONS:- Monitor Blood Glucose as ordered- Assess for signs and symptoms of hyperglycemia and hypoglycemia- Administer ordered medications to maintain glucose within target range- Assess barriers to adequate nutritional intake and initiate nutrition consult as needed- Instruct patient on self management of diabetes  Outcome: Adequate for Discharge     Problem: Patient/Family Goals  Goal: Patient/Family Long Term Goal  Description: Patient's Long Term Goal: Interventions:- - See additional Care Plan goals for specific interventions  Outcome: Adequate for Discharge  Goal: Patient/Family Short Term Goal  Description: Patient's Short Term Goal: Interventions: - - See additional Care Plan goals for specific interventions  Outcome: Adequate for Discharge     Problem: CARDIOVASCULAR - ADULT  Goal: Maintains optimal cardiac output and hemodynamic stability  Description: INTERVENTIONS:- Monitor vital signs, rhythm, and trends- Monitor for bleeding, hypotension and signs of decreased cardiac output- Evaluate effectiveness of vasoactive medications to optimize hemodynamic stability- Monitor arterial and/or venous puncture sites for bleeding and/or hematoma- Assess quality of pulses, skin color and temperature- Assess for signs of  decreased coronary artery perfusion - ex. Angina- Evaluate fluid balance, assess for edema, trend weights  Outcome: Adequate for Discharge  Goal: Absence of cardiac arrhythmias or at baseline  Description: INTERVENTIONS:- Continuous cardiac monitoring, monitor vital signs, obtain 12 lead EKG if indicated- Evaluate effectiveness of antiarrhythmic and heart rate control medications as ordered- Initiate emergency measures for life threatening arrhythmias- Monitor electrolytes and administer replacement therapy as ordered  Outcome: Adequate for Discharge     Problem: RESPIRATORY - ADULT  Goal: Achieves optimal ventilation and oxygenation  Description: INTERVENTIONS:- Assess for changes in respiratory status- Assess for changes in mentation and behavior- Position to facilitate oxygenation and minimize respiratory effort- Oxygen supplementation based on oxygen saturation or ABGs- Provide Smoking Cessation handout, if applicable- Encourage broncho-pulmonary hygiene including cough, deep breathe, Incentive Spirometry- Assess the need for suctioning and perform as needed- Assess and instruct to report SOB or any respiratory difficulty- Respiratory Therapy support as indicated- Manage/alleviate anxiety- Monitor for signs/symptoms of CO2 retention  Outcome: Adequate for Discharge     Problem: Integumentary status not within defined limits  Goal: Pt's integumentary status will be adequate for discharge  Outcome: Adequate for Discharge     Problem: PAIN - ADULT  Goal: Verbalizes/displays adequate comfort level or patient's stated pain goal  Description: INTERVENTIONS:- Encourage pt to monitor pain and request assistance- Assess pain using appropriate pain scale- Administer analgesics based on type and severity of pain and evaluate response- Implement non-pharmacological measures as appropriate and evaluate response- Consider cultural and social influences on pain and pain management- Manage/alleviate anxiety- Utilize distraction  and/or relaxation techniques- Monitor for opioid side effects- Notify MD/LIP if interventions unsuccessful or patient reports new pain- Anticipate increased pain with activity and pre-medicate as appropriate  Outcome: Adequate for Discharge

## 2025-04-30 NOTE — PROGRESS NOTES
Cardiology Progress Note    Carolina Montanez Patient Status:  Inpatient    1950 MRN [id number]   Location [facility] 2W/SW Attending Elizabeth Quintero MD   Hosp Day # 7 PCP Logan Hernandez MD     Interval Note:  Patient seen and examined  CT yesterday with extensive persistent pneumomediastinum, pneumothorax and subcutaneous emphysema dissecting into the right masseter space and retroretropharyngeal region  Pt has no complaints - he states his discomfort from the subcutaneous emphysema has resolved    --------------------------------------------------------------------------------------------------------------------------------  ROS 12 systems reviewed, pertinent findings above.  ROS    History:  Past Medical History[1]  Past Surgical History[2]  Family History[3]   reports that he has never smoked. He has never used smokeless tobacco. He reports that he does not drink alcohol and does not use drugs.    Objective:   Temp: 97.4 °F (36.3 °C)  Pulse: 97  Resp: 17  BP: 118/86    Intake/Output:     Intake/Output Summary (Last 24 hours) at 2025 0900  Last data filed at 2025 0500  Gross per 24 hour   Intake 100 ml   Output 0 ml   Net 100 ml       Physical Exam:    General: AO x 3, no acute distress  HEENT: Normocephalic, anicteric sclera, neck supple. Diffuse crepitus upper chest, neck and face  Neck: No JVD, carotids 2+, no bruits.  Fullness around left face, neck  Cardiac: Regular rate and rhythm. S1, S2 normal. No murmur, pericardial rub, S3.  Lungs: Clear without wheezes, rales, rhonchi or dullness.  Normal excursions and effort.  Abdomen: Soft, non-tender. BS-present.  Extremities: Without clubbing, cyanosis or edema.  Peripheral pulses are 2+.  Neurologic: Non-focal  Skin: Warm and dry.       Assessment   Unstable angina, severe multivessel coronary disease  S/p CABG X3 (LIMA-LAD, VG-OM, VG-PDA) 25  History of sinus bradycardia status post PPM  Postoperative bilateral  pneumothoraces and  pneumomediastinum, subcutaneous emphysema    Plan  CT head, chest yesterday shows persistent pneumomediastinum, small pneumothorax and subcutaneous air extending into the left masseter, retropharyngeal spaces.  Pt hemodynamically stable  Ok from Cardiology standpoint for discharge and f/u in cardiology clinic  Discharge on asa, plavix, statin, lipitor, zetia, change lopressor to Toprol 50mg    Thank you for allowing me to take part in the care of Carolina Montanez. Please call with any questions of concerns.      Level of care: L4    Leandro Bob DO  [facility]   Interventional Cardiac and Vascular Services               [1]   Past Medical History:   Acid reflux    Age-related nuclear cataract of both eyes    Back problem    CAD (coronary artery disease)    COPD (chronic obstructive pulmonary disease) (HCC)    Coronary atherosclerosis    Decreased lung capacity    Deviated septum    Esophageal reflux    Floaters, right    Fractured nasal bones    Hearing difficulty    High blood pressure    High cholesterol    Macular degeneration    POAG (primary open-angle glaucoma)    Started Latanoprost in both eyes at bedtime- RJM    Shortness of breath    with exertion    Tubular adenoma of colon    polyps removed; if interested in continuing screening, next in 2029    Visual impairment    Glasses   [2]   Past Surgical History:  Procedure Laterality Date    Angiogram      Was told he had a blockage     Cardiac pacemaker placement      [employer] per patient    Colonoscopy      [facility] about 5 yrs ago    Colonoscopy N/A 08/20/2019    Procedure: COLONOSCOPY;  Surgeon: IRINEO Villasenor MD;  Location: [facility] ENDOSCOPY    Colonoscopy N/A 4/23/2024    Procedure: COLONOSCOPY;  Surgeon: IRINEO Villasenor MD;  Location: [facility] ENDOSCOPY    Egd      Other surgical history      nasal surgery   [3]   Family History  Problem Relation Age of Onset    Diabetes Father     Cancer Sister         stomach cancer    No  Known Problems Sister     Cancer Brother     Glaucoma Neg     Macular degeneration Neg

## 2025-04-30 NOTE — PLAN OF CARE
Pt A&Ox4 on RA.     Subacute emphysema present, up to jaw bone on R side and up to cheek bone on L side of face.     Plan to discharge with HH today.     Bed locked in lowest position, call light within reach. Safety maintained.   Problem: Patient Centered Care  Goal: Patient preferences are identified and integrated in the patient's plan of care  Description: Interventions:- What would you like us to know as we care for you?  I have two daughters- Provide timely, complete, and accurate information to patient/family- Incorporate patient and family knowledge, values, beliefs, and cultural backgrounds into the planning and delivery of care- Encourage patient/family to participate in care and decision-making at the level they choose- Honor patient and family perspectives and choices  Outcome: Progressing     Problem: Diabetes/Glucose Control  Goal: Glucose maintained within prescribed range  Description: INTERVENTIONS:- Monitor Blood Glucose as ordered- Assess for signs and symptoms of hyperglycemia and hypoglycemia- Administer ordered medications to maintain glucose within target range- Assess barriers to adequate nutritional intake and initiate nutrition consult as needed- Instruct patient on self management of diabetes  Outcome: Progressing     Problem: CARDIOVASCULAR - ADULT  Goal: Maintains optimal cardiac output and hemodynamic stability  Description: INTERVENTIONS:- Monitor vital signs, rhythm, and trends- Monitor for bleeding, hypotension and signs of decreased cardiac output- Evaluate effectiveness of vasoactive medications to optimize hemodynamic stability- Monitor arterial and/or venous puncture sites for bleeding and/or hematoma- Assess quality of pulses, skin color and temperature- Assess for signs of decreased coronary artery perfusion - ex. Angina- Evaluate fluid balance, assess for edema, trend weights  Outcome: Progressing  Goal: Absence of cardiac arrhythmias or at baseline  Description:  INTERVENTIONS:- Continuous cardiac monitoring, monitor vital signs, obtain 12 lead EKG if indicated- Evaluate effectiveness of antiarrhythmic and heart rate control medications as ordered- Initiate emergency measures for life threatening arrhythmias- Monitor electrolytes and administer replacement therapy as ordered  Outcome: Progressing     Problem: RESPIRATORY - ADULT  Goal: Achieves optimal ventilation and oxygenation  Description: INTERVENTIONS:- Assess for changes in respiratory status- Assess for changes in mentation and behavior- Position to facilitate oxygenation and minimize respiratory effort- Oxygen supplementation based on oxygen saturation or ABGs- Provide Smoking Cessation handout, if applicable- Encourage broncho-pulmonary hygiene including cough, deep breathe, Incentive Spirometry- Assess the need for suctioning and perform as needed- Assess and instruct to report SOB or any respiratory difficulty- Respiratory Therapy support as indicated- Manage/alleviate anxiety- Monitor for signs/symptoms of CO2 retention  Outcome: Progressing     Problem: Integumentary status not within defined limits  Goal: Pt's integumentary status will be adequate for discharge  Outcome: Progressing     Problem: PAIN - ADULT  Goal: Verbalizes/displays adequate comfort level or patient's stated pain goal  Description: INTERVENTIONS:- Encourage pt to monitor pain and request assistance- Assess pain using appropriate pain scale- Administer analgesics based on type and severity of pain and evaluate response- Implement non-pharmacological measures as appropriate and evaluate response- Consider cultural and social influences on pain and pain management- Manage/alleviate anxiety- Utilize distraction and/or relaxation techniques- Monitor for opioid side effects- Notify MD/LIP if interventions unsuccessful or patient reports new pain- Anticipate increased pain with activity and pre-medicate as appropriate  Outcome: Progressing

## 2025-05-01 NOTE — PROGRESS NOTES
Left message on mailbox for patient to call care manager back for transitional care management/hospital follow-up call.  Care  information included in message.     2nd attempt

## 2025-05-01 NOTE — PROGRESS NOTES
Left message on mailbox for patient to call care manager back for transitional care management/hospital follow-up call.  Care  information included in message.    3rd attempt

## 2025-05-01 NOTE — PROGRESS NOTES
Left message on mailbox for patient to call care manager back for transitional care management/hospital follow-up call.  Care  information included in message.  1st attempt

## 2025-05-13 NOTE — TELEPHONE ENCOUNTER
Refill passed per Clinic protocol.  Requested Prescriptions   Pending Prescriptions Disp Refills    MONTELUKAST 10 MG Oral Tab [Pharmacy Med Name: Montelukast Sodium Oral Tablet 10 MG] 90 tablet 0     Sig: TAKE ONE TABLET BY MOUTH NIGHTLY       Asthma & COPD Medication Protocol Passed - 5/13/2025  2:57 PM        Passed - Appointment in past 6 or next 3 months      Recent Outpatient Visits              3 weeks ago Preop testing    [facility]    Nurse Only    3 weeks ago Preop testing    [facility]    Nurse Only    9 months ago Acute pain of left shoulder    [facility] Leni Morales APRN    Office Visit    1 year ago [place]ot[place]'s syndrome    [facility] Perez Bloom MD    Office Visit    1 year [facility]          Future Appointments         Provider Department Appt Notes    Tomorrow Eulalio Hernandez MD [facility] CABG 04/23    In 2 weeks Logan Hernandez MD [facility] follow up after [facility] discharge. spouse rescheduled from 5-27-25 policy informed to spouse.    In 2 weeks [facility] CARD ORIENTATION [facility] Cardiopulmonary Rehab CABG x3 Dr. Bob                    Passed - Medication is active on med list

## 2025-05-29 NOTE — PROGRESS NOTES
Patient ID: Tarik Turpin is a 75 year old male.  Chief Complaint   Patient presents with    Follow - Up     Follow up after surgery on 04/23/25    Burning On Urination     Pain level 4-5        HISTORY OF PRESENT ILLNESS:   HPI  History of Present Illness  Tarik Turpin is a 75 year old male with complete heart block and recent coronary artery bypass surgery who presents with jaw pain and urinary burning sensation.    He has a history of complete heart block diagnosed in December 2023, which led to the placement of a pacemaker at [facility]. Following the pacemaker placement, he experienced ongoing fatigue and shortness of breath. A coronary scan and subsequent angiogram revealed multivessel disease, leading to a coronary artery bypass surgery on April 23, 2025.    Post-surgery, he experienced complications including pneumomediastinum, which resulted in persistent jaw pain. The jaw pain has not completely resolved and is being monitored by his cardiologist and cardiothoracic surgeon.    He also reports a burning sensation during urination that began after the surgery. A urine test was conducted to rule out infection, which returned negative results. He describes the burning as a light sensation along the urinary tract, without pain at the tip of the penis or any bleeding.    His current medications include lisinopril 10 mg daily, along with Plavix, Zetia, atorvastatin, metoprolol, and montelukast. He is uncertain about the necessity of continuing some medications post-surgery.    Review of Systems  Ten point review of systems otherwise negative with the exception of HPI and assessment and plan.    MEDICAL HISTORY:     Past Medical History[1]    Past Surgical History[2]    Medications - Current[3]    Allergies:Allergies[4]    Social History     Socioeconomic History    Marital status:      Spouse name: Not on file    Number of children: Not on file    Years of education: Not on file     Highest education level: Not on file   Occupational History    Not on file   Tobacco Use    Smoking status: Never    Smokeless tobacco: Never   Vaping Use    Vaping status: Never Used   Substance and Sexual Activity    Alcohol use: Never    Drug use: Never    Sexual activity: Not Currently   Other Topics Concern     Service Not Asked    Blood Transfusions Not Asked    Caffeine Concern No    Occupational Exposure Not Asked    Hobby Hazards Not Asked    Sleep Concern Not Asked    Stress Concern Not Asked    Weight Concern Not Asked    Special Diet Not Asked    Back Care Not Asked    Exercise No    Bike Helmet Not Asked    Seat Belt Not Asked    Self-Exams Not Asked   Social History Narrative    The patient does not use an assistive device..      The patient does live in a home with stairs.     Social Drivers of Health     Food Insecurity: Food Insecurity Present (5/29/2025)    NCSS - Food Insecurity     Worried About Running Out of Food in the Last Year: Yes     Ran Out of Food in the Last Year: Yes   Transportation Needs: Unmet Transportation Needs (5/29/2025)    NCSS - Transportation     Lack of Transportation: Yes   Stress: Not on file   Housing Stability: At Risk (5/29/2025)    NCSS - Housing/Utilities     Has Housing: Yes     Worried About Losing Housing: Yes     Unable to Get Utilities: Yes           PHYSICAL EXAM:     Vitals:    05/29/25 0936   BP: (!) 88/60   Pulse: 80   Resp: 18   Temp: 97.9 °F (36.6 °C)   TempSrc: Temporal   SpO2: 99%   Weight: 155 lb 3.2 oz (70.4 kg)   Height: 5' 9.02\" (1.753 m)       Body mass index is 22.91 kg/m².    Physical Exam  Constitutional:       Appearance: Normal appearance.   Eyes:      General: No scleral icterus.  Cardiovascular:      Rate and Rhythm: Normal rate and regular rhythm.      Pulses: Normal pulses.      Heart sounds: Normal heart sounds. No murmur heard.  Pulmonary:      Effort: Pulmonary effort is normal. No respiratory distress.      Breath sounds: Normal  breath sounds. No stridor. No wheezing, rhonchi or rales.   Chest:       Abdominal:      General: Abdomen is flat. Bowel sounds are normal.      Palpations: Abdomen is soft.   Musculoskeletal:        Legs:    Neurological:      Mental Status: He is alert.   Psychiatric:         Mood and Affect: Mood normal.         Behavior: Behavior normal.       ASSESSMENT/PLAN:   1. Third degree heart block (HCC)  Status post pacemaker placement 2023.  This is functioning well.  Follow-up with cardiology.    2. Status post placement of cardiac pacemaker  As per #1.    3. Hx of CABG  Status post CABG x 3 vessels in April 2025.  Currently chest pain-free.  Recently followed up with cardiology.  Unclear if he is supposed to be on lisinopril.  Given his marginally low blood pressure we will have him hold the lisinopril and follow-up with cardiologist.    4. Coronary artery disease involving native coronary artery of native heart without angina pectoris  As per #3.    5. Dysuria  Recent UA reviewed and was completely normal.  Will not work-up further at this time.  Stay well hydrated.  If persists will send to urology.  Didn't have a lópez catheter in the hospital.  States he had a [place] condom catheter.    Return in about 4 weeks (around 6/26/2025) for Complete physical.    This note was prepared using Dragon Medical voice recognition dictation software. As a result errors may occur. When identified these errors have been corrected. While every attempt is made to correct errors during dictation discrepancies may still exist.    Logan Hernandez MD  5/29/2025       [1]   Past Medical History:   Acid reflux    Age-related nuclear cataract of both eyes    Back problem    CAD (coronary artery disease)    COPD (chronic obstructive pulmonary disease) (HCC)    Coronary atherosclerosis    Decreased lung capacity    Deviated septum    Esophageal reflux    Floaters, right    Fractured nasal bones    Hearing difficulty    High blood pressure    High  cholesterol    Macular degeneration    POAG (primary open-angle glaucoma)    Started Latanoprost in both eyes at bedtime- RJM    Shortness of breath    with exertion    Tubular adenoma of colon    polyps removed; if interested in continuing screening, next in 2029    Visual impairment    Glasses   [2]   Past Surgical History:  Procedure Laterality Date    Angiogram      Was told he had a blockage     Cardiac pacemaker placement      [employer] per patient    Colonoscopy      [facility] about 5 yrs ago    Colonoscopy N/A 08/20/2019    Procedure: COLONOSCOPY;  Surgeon: IRINEO Villasenor MD;  Location: [facility] ENDOSCOPY    Colonoscopy N/A 4/23/2024    Procedure: COLONOSCOPY;  Surgeon: IRINEO Villasenor MD;  Location: [facility] ENDOSCOPY    Egd      Other surgical history      nasal surgery   [3]   Current Outpatient Medications:     lisinopril 10 MG Oral Tab, Take 1 tablet (10 mg total) by mouth daily., Disp: , Rfl:     montelukast 10 MG Oral Tab, Take 1 tablet (10 mg total) by mouth nightly., Disp: 90 tablet, Rfl: 3    METOPROLOL SUCCINATE ER 50 MG Oral Tablet 24 Hr, Take 1 tablet (50 mg total) by mouth Daily Beta Blocker., Disp: 30 tablet, Rfl: 1    famotidine 20 MG Oral Tab, Take 1 tablet (20 mg total) by mouth daily., Disp: 30 tablet, Rfl: 0    atorvastatin 80 MG Oral Tab, Take 1 tablet (80 mg total) by mouth nightly., Disp: 30 tablet, Rfl: 0    clopidogrel (PLAVIX) 75 MG Oral Tab, Take 1 tablet (75 mg total) by mouth daily., Disp: 30 tablet, Rfl: 0    ezetimibe 10 MG Oral Tab, Take 1 tablet (10 mg total) by mouth daily. (Patient taking differently: Take 1 tablet (10 mg total) by mouth nightly.), Disp: 90 tablet, Rfl: 3    aspirin 81 MG Oral Tab EC, Take 1 tablet (81 mg total) by mouth at bedtime., Disp: , Rfl:     Calcium Carbonate-Vitamin D (CALTRATE 600+D OR), Take by mouth nightly., Disp: , Rfl:     Probiotic Product (PROBIOTIC DAILY OR), Take by mouth at bedtime., Disp: , Rfl:   [4] No Known Allergies

## 2025-05-29 NOTE — PROGRESS NOTES
The following individual(s) verbally consented to be recorded using ambient AI listening technology and understand that they can each withdraw their consent to this listening technology at any point by asking the clinician to turn off or pause the recording:    Patient name: Tarik Turpin  Additional names:  n/a

## 2025-06-02 NOTE — PROGRESS NOTES
Ladonna Garcia is a 66year old male. HPI:     HPI     Here for an IOP check and Gonio after starting Latanoprost in May 2021 and is using one drop in both eyes at bedtime. Pt states that his vision is stable.      Last edited by Soren Arzate O.T. on 10 PROGRESS NOTE    Subjective     Raven is a 38 year old here for Office Visit and Finger   Patient presents today for an acute care visit with a 1 month history of pain, swelling, erythema and intermittent drainage of distal left fourth digit.  Patient notes that she \"bumped\" this finger about a month ago, and since that time developed progressively worsening erythema and edema of the distal left fourth digit, most noticeably around the proximal nail fold.  Patient has been using Epsom salt soaks and topical triple antibiotic ointment without significant improvement, with worsening of the periungual erythema in particular over the past few days.  Involved area is tender to the touch.  No involvement of the pad/volar surface of the fourth digit.  No fevers or chills.  Remote history of possible left fourth digit dislocation that was never formally diagnosed, no other recent significant trauma.    Review of Systems  As documented above.    SDOH Never Smoker          Objective   Vitals:    06/02/25 1256   BP: 102/70   Pulse: 63   Resp: 16   Temp: 97.7 °F (36.5 °C)   TempSrc: Tympanic   SpO2: 99%   Weight: 77 kg (169 lb 12.1 oz)     Physical Exam    General:  Patient is alert and oriented x 3, no acute distress.  Well-groomed, appropriate mood and affect, good eye contact.     Derm: Left fourth digit with moderate erythema/edena of entire proximal nail fold extending to the DIP joint.  Normal flexion extension of the fourth digit, no tenderness of the volar surface of the digit.  Positive tenderness of the area of erythema above.  Abnormal appearing tissue at the base of the nail most consistent with granulation tissue, very small amount of purulent drainage noted in this area. See media for picture.      Procedure Note:   Verbal consent obtained for I&D.  Risks include pain bleeding and infection.  Patient states understanding. Ledft distal, dorsal fourth digit cleaned with iodine, topical ethyl chloride spray applied  latanoprost 0.005 % Ophthalmic Solution Place 1 drop into both eyes nightly. Instill 1 drop by ophthalmic route every night into both eyes 7.5 mL 3   • Calcium Carbonate-Vitamin D (CALTRATE 600+D OR) Take by mouth.      • Ascorbic Acid (VITAMIN C) 1000 MG O to proximal nail fold, #11 blade used to make very small incision and area of granulation tissue at proximal nail fold slightly medial to midline.  Small amount of bleeding noted, scant amount of purulent drainage additionally able to be expressed post incision. Nail dressed with Telfa and a Band-Aid.       ASSESSMENT AND PLAN        38-year-old female with left fourth digit paronychia and associated cellulitis not responding to conservative therapy with use of soaks and topical triple antibiotic ointment.  No significant drainage on I&D today, but if patient does not respond to additional oral/topical treatment, I am concerned there may be a more complicated infection and will need additional evaluation.  Will start by treating cellulitis with a 7-day course of cephalexin and continuing finger soaks but changing her topical antibiotic ointment application to mupirocin instead of triple antibiotic ointment as patient also has a history of eczema and sensitivity to various topicals, and there is some concern for potential Neosporin hypersensitivity worsening the current situation.  Call for worsening erythema, edema, purulent drainage or development of fever or other new concerns.    1. Cellulitis of finger of left hand  -     cephalexin (KEFLEX) 500 MG capsule; Take 1 capsule by mouth 4 times daily for 7 days.  2. Paronychia of finger of left hand  -     mupirocin (BACTROBAN) 2 % ointment; Apply topically 3 times daily for 7 days.      Return in about 1 year (around 6/2/2026) for annual WWE, sooner if current issue persists/worsens.         Ratio 0.8 0.85            Refraction     Wearing Rx       Sphere Cylinder Axis Add    Right -3.25 +0.75 160 +2.25    Left -3.75 +0.75 010 +2.25    Type: Progressive bifocal                 ASSESSMENT/PLAN:     Diagnoses and Plan:     POAG (primary open-ang

## 2025-06-09 NOTE — TELEPHONE ENCOUNTER
Dr. Hernandez, refills marked as \"not appropriate.\" Please advise on what we should tell patient.

## 2025-07-07 ENCOUNTER — TELEPHONE (OUTPATIENT)
Dept: CARDIOLOGY | Age: 75
End: 2025-07-07

## 2025-07-09 ENCOUNTER — APPOINTMENT (OUTPATIENT)
Dept: CARDIOLOGY | Age: 75
End: 2025-07-09
Attending: INTERNAL MEDICINE

## 2025-07-09 ENCOUNTER — APPOINTMENT (OUTPATIENT)
Dept: CARDIOLOGY | Age: 75
End: 2025-07-09

## (undated) DIAGNOSIS — R13.10 DYSPHAGIA, UNSPECIFIED TYPE: Primary | ICD-10-CM

## (undated) DIAGNOSIS — M79.2 NEURALGIA: Primary | ICD-10-CM

## (undated) DIAGNOSIS — J43.2 CENTRILOBULAR EMPHYSEMA (HCC): ICD-10-CM

## (undated) DEVICE — SOL NACL IRRIG 0.9% 1000ML BTL

## (undated) DEVICE — BAG WST 48IN SPK FLTR RLLR CLAMP FEMALE LL TUBE VENT MERIT

## (undated) DEVICE — 35 ML SYRINGE REGULAR TIP: Brand: MONOJECT

## (undated) DEVICE — COVER PROBE FLX-FEEL 58X6IN OR 4 FLAG 2 SHT 24 PRINT STRL LF

## (undated) DEVICE — CATHETER PCNG BP SHRD PIN FLXB SYRINGE PACEL VENTRICLE PU 10

## (undated) DEVICE — SLEEVE CATH 9- FR 60CM REPOSITION LL ADPR HEMOSTASIS VLV

## (undated) DEVICE — Device

## (undated) DEVICE — SHEATH 6FR 10CM 2.5CM .035IN INTRO SNAP ON DIL LOCK KINK RST

## (undated) DEVICE — CATHETER 6FR JR4 CRV 100CM LG INNER LUM RADOPQ SLCT INFNT

## (undated) DEVICE — SKIN PREP TRAY 4 COMPARTM TRAY: Brand: MEDLINE INDUSTRIES, INC.

## (undated) DEVICE — PAD DRSG 3X2IN CRD POLY CTN NADH ABS PERFORATE FILM CUT TO

## (undated) DEVICE — DRESSING GAUZE 1.5X1.5IN HMST QUIKCLOT

## (undated) DEVICE — HOLDER LIMB THK.25IN 13X3IN 31IN 2 PC 1 STRAP QRLSE BCKL

## (undated) DEVICE — KIT ENDO ORCAPOD 160/180/190

## (undated) DEVICE — GLOVE SURG 5.5 PROTEXIS LF CRM PF BEAD CUFF STRL PLISPRN

## (undated) DEVICE — OUTPATIENT: Brand: MEDLINE INDUSTRIES, INC.

## (undated) DEVICE — DEVICE V-LOC 180 12IN 4-0 P-12 38 CRC 19MM PRM RVRS CUT ABS

## (undated) DEVICE — ADAPTER TBG 2 MALE LL DEHP-FR STRL LF MEDEX 1IN DISP .1ML IV

## (undated) DEVICE — SNARE CAPTIFLEX MICRO-OVL OLY

## (undated) DEVICE — MEGADYNE E-Z CLEAN BLADE 2.75"

## (undated) DEVICE — Device: Brand: DUAL NARE NASAL CANNULAE FEMALE LUER CON 7FT O2 TUBE

## (undated) DEVICE — KIT MICROINTRODUCER 4FR .018IN 40CM 7CM .018IN SFTP MNDRL

## (undated) DEVICE — LINE MNTR ADLT SET O2 INTMD

## (undated) DEVICE — DRAPE L4 APRTR BRR PRTC 42X29IN 4.5X3.5IN SURG ACTI-GARD

## (undated) DEVICE — SUT MONOCRYL 4-0 PC-3 Y845G

## (undated) DEVICE — SOLUTION IRR 1000ML 0.9% NACL PLASTIC POUR BTL ISTNC N-PYRG

## (undated) DEVICE — CANNULA NSL ADPR SET NOMOLINE

## (undated) DEVICE — SUTURE VICRYL 2-0 CT2 27IN BRAID COAT ABS UNDYED J269H

## (undated) DEVICE — ELECTRODE DFBR A 6X4.25IN MLFNC RDTRNS POUCH PADPRO ADULT

## (undated) DEVICE — MEDI-VAC NON-CONDUCTIVE SUCTION TUBING 6MM X 1.8M (6FT.) L: Brand: CARDINAL HEALTH

## (undated) DEVICE — HEMOSTAT CMPR VASC REG 24CM

## (undated) DEVICE — CATH IMPULSE FL3.5 6F 100CM

## (undated) DEVICE — STRIP 4X.5IN STRSTRP IPHR POLY POR ADH REINFORCE

## (undated) DEVICE — SUTURE PRMHND 0 SH 30IN SILK BRAID NABSB BLK K834H

## (undated) DEVICE — SUTURE ETHIBOND EXCEL 0 SH 30IN BRAID NABSB GRN X834H

## (undated) DEVICE — Device: Brand: CUSTOM PROCEDURE KIT

## (undated) DEVICE — GLOVE SURG 7.5 PROTEXIS LF CRM PF SMTH BEAD CUFF STRL

## (undated) DEVICE — GUIDEWIRE STRT 10CM 260CM J CRV TPR .035IN VASC PTFE PERIPH

## (undated) DEVICE — DRESSING GRMCDL ANTIMICROBIAL ADH CNTR HOLE SLIT BPTCH CHG

## (undated) DEVICE — DRESSING TRANS 4.75X4IN ADH HPOAL WTPRF TEGADERM PU STD STRL

## (undated) DEVICE — FORCEPS BX LG 2.4MM X 240CM NDL RAD JAW 4

## (undated) DEVICE — SUT VICRYL 3-0 J104T

## (undated) DEVICE — KIT CLEAN ENDOKIT 1.1OZ GOWNX2

## (undated) DEVICE — DEVICE V-LOC 180 9IN 3-0 5-20 ABS GRN CLSR

## (undated) DEVICE — 60 ML SYRINGE REGULAR TIP: Brand: MONOJECT

## (undated) DEVICE — SHIELD RAD RADPAD RAD PRTC STRL FEM ENTRY ANGIO

## (undated) DEVICE — KIT MICROINTRODUCER 4FR 21GA 40CM 4CM .018IN SMTH NDL MNDRL

## (undated) DEVICE — Device: Brand: DEFENDO AIR/WATER/SUCTION AND BIOPSY VALVE

## (undated) DEVICE — KIT INTRO 6FR 21GA 10CM 45CM .021IN 35MM FLEX STRGT SHTH DIL

## (undated) DEVICE — LASSO POLYPECTOMY SNARE: Brand: LASSO

## (undated) DEVICE — MASK PROC W/VISOR ANTIGLARE

## (undated) DEVICE — TRAP MCS 40ML 5IN PLS SCR CAP

## (undated) DEVICE — ELECTRODE PT RTN C30- LB 9FT CORD NONIRRITATE NONSENSITIZE

## (undated) DEVICE — CAUTERY: TIP CLEANER XR 100/CS: Brand: MEDICAL ACTION INDUSTRIES

## (undated) DEVICE — HEMOSTAT ABS BIONERT ARISTA MPH PLANT STRH 1GM

## (undated) DEVICE — CLIP LGT 11MM OPEN 2.8MM 235CM

## (undated) DEVICE — SCALPEL SURG 10 BLADE D INDCTR SPCL GRV HNDL STRL DISP PRSNA

## (undated) DEVICE — SHEATH 5FR 10CM 2.5CM .038IN GUIDE SNAP ON DIL LOCK KINK RST

## (undated) NOTE — LETTER
Abdirashid Mayers, [address] [facility] [place]       06/19/19        Patient: Elayne Russell   YOB: 1950   Date of Visit: 6/18/2019       Dear  Dr. Denis Fontaine MD,      Thank you for referring Elayne Russell to my practice.   Please find my

## (undated) NOTE — LETTER
Date & Time: 8/1/2020, 8:34 AM  Patient: Jez Luna        This certifies that Ivy Blackman, a patient at [facility], am leaving the facility voluntarily and against the advice of my physician.     I acknowledge that I have

## (undated) NOTE — LETTER
Patient Name: Chandra Li  YOB: 1950          MRN number:  [id number]  Date:  4/16/2021  Referring Physician:  Roland Loera EVALUATION:    Referring Physician: Dr. Chucho Arreaga  Diagnosis: vocal cord nodules    Date of Service voice. Pt goals include \"to get my own voice back\". Problem List  Active Problems:  Active Problems:    * No active hospital problems.  *      Past Medical History  Past Medical History:   Diagnosis Date   • Acid reflux    • Age-related nuclear catar

## (undated) NOTE — LETTER
Meseret Dai, [address] [facility]  [place]       12/15/23        Patient: Nicholas Frias   YOB: 1950   Date of Visit: 12/15/2023       Dear  Dr. Lacie Baumann MD,      Thank you for referring Nicholas Frias to my practice. Please find my assessment and plan below. ASSESSMENT AND PLAN    1. Eagle's syndrome  Primarily complains of pain in the submandibular region on the left. Quite significantly when he turns to the left. He will syndrome? He has been told in the past that he may have an elongated styloid process. I did recommend that he undergo a CT scan of the neck to evaluate for an elongated styloid process I will start him on cyclobenzaprine and meloxicam for his discomfort. Return to see me after his studies  - CT SOFT TISSUE OF NECK(CONTRAST ONLY) (CPT=70491); Future               Sincerely,   Jeison Kahn. Pamela Benitez MD   [address] , [place]  [address] [address]    Document electronically generated by:  Jeison Kahn.  Pamela Benitez MD

## (undated) NOTE — LETTER
[facility]  [address], [place], [place]  Authorization for Surgical Operation and Procedure                                                                                           I hereby authorize IRINEO Villasenor MD, my physician and his/her assistants (if applicable), which may include medical students, residents, and/or fellows, to perform the following surgical operation/ procedure and administer such anesthesia as may be determined necessary by my physician: Operation/Procedure name (s) COLONOSCOPY on Tarik Papi   2.   I recognize that during the surgical operation/procedure, unforeseen conditions may necessitate additional or different procedures than those listed above.  I, therefore, further authorize and request that the above-named surgeon, assistants, or designees perform such procedures as are, in their judgment, necessary and desirable.    3.   My surgeon/physician has discussed prior to my surgery the potential benefits, risks and side effects of this procedure; the likelihood of achieving goals; and potential problems that might occur during recuperation.  They also discussed reasonable alternatives to the procedure, including risks, benefits, and side effects related to the alternatives and risks related to not receiving this procedure.  I have had all my questions answered and I acknowledge that no guarantee has been made as to the result that may be obtained.    4.   Should the need arise during my operation/procedure, which includes change of level of care prior to discharge, I also consent to the administration of blood and/or blood products.  Further, I understand that despite careful testing and screening of blood or blood products by collecting agencies, I may still be subject to ill effects as a result of receiving a blood transfusion and/or blood products.  The following are some, but not all, of the potential risks that can occur: fever and allergic reactions,  hemolytic reactions, transmission of diseases such as Hepatitis, AIDS and Cytomegalovirus (CMV) and fluid overload.  In the event that I wish to have an autologous transfusion of my own blood, or a directed donor transfusion, I will discuss this with my physician.  Check only if Refusing Blood or Blood Products  I understand refusal of blood or blood products as deemed necessary by my physician may have serious consequences to my condition to include possible death. I hereby assume responsibility for my refusal and release the hospital, its personnel, and my physicians from any responsibility for the consequences of my refusal.    o  Refuse   5.   I authorize the use of any specimen, organs, tissues, body parts or foreign objects that may be removed from my body during the operation/procedure for diagnosis, research or teaching purposes and their subsequent disposal by hospital authorities.  I also authorize the release of specimen test results and/or written reports to my treating physician on the hospital medical staff or other referring or consulting physicians involved in my care, at the discretion of the Pathologist or my treating physician.    6.   I consent to the photographing or videotaping of the operations or procedures to be performed, including appropriate portions of my body for medical, scientific, or educational purposes, provided my identity is not revealed by the pictures or by descriptive texts accompanying them.  If the procedure has been photographed/videotaped, the surgeon will obtain the original picture, image, videotape or CD.  The hospital will not be responsible for storage, release or maintenance of the picture, image, tape or CD.    7.   I consent to the presence of a  or observers in the operating room as deemed necessary by my physician or their designees.    8.   I recognize that in the event my procedure results in extended X-Ray/fluoroscopy time, I may develop a  skin reaction.    9. If I have a Do Not Attempt Resuscitation (DNAR) order in place, that status will be suspended while in the operating room, procedural suite, and during the recovery period unless otherwise explicitly stated by me (or a person authorized to consent on my behalf). The surgeon or my attending physician will determine when the applicable recovery period ends for purposes of reinstating the DNAR order.  10. Patients having a sterilization procedure: I understand that if the procedure is successful the results will be permanent and it will therefore be impossible for me to inseminate, conceive, or bear children.  I also understand that the procedure is intended to result in sterility, although the result has not been guaranteed.   11. I acknowledge that my physician has explained sedation/analgesia administration to me including the risk and benefits I consent to the administration of sedation/analgesia as may be necessary or desirable in the judgment of my physician.    I CERTIFY THAT I HAVE READ AND FULLY UNDERSTAND THE ABOVE CONSENT TO OPERATION and/or OTHER PROCEDURE.     _________________________________________ _________________________________     ___________________________________  Signature of Patient     Signature of Responsible Person                   Printed Name of Responsible Person                              _________________________________________ ______________________________        ___________________________________  Signature of Witness         Date  Time         Relationship to Patient    STATEMENT OF PHYSICIAN My signature below affirms that prior to the time of the procedure; I have explained to the patient and/or his/her legal representative, the risks and benefits involved in the proposed treatment and any reasonable alternative to the proposed treatment. I have also explained the risks and benefits involved in refusal of the proposed treatment and alternatives to the  proposed treatment and have answered the patient's questions. If I have a significant financial interest in a co-management agreement or a significant financial interest in any product or implant, or other significant relationship used in this procedure/surgery, I have disclosed this and had a discussion with my patient.     _______________________________________________________________ _____________________________  (Signature of Physician)                                                                                         (Date)                                   (Time)  Patient Name: Tarik Turpin    : 1950   Printed: 2024      Medical Record #: [id number]                                              Page 1 of 1

## (undated) NOTE — LETTER
[facility]  Administration of Anesthesia  Tarik NG agree to be cared for by a physician anesthesiologist alone and/or with a nurse anesthetist, who is specially trained to monitor me and give me medicine to put me to sleep or keep me comfortable during my procedure    I understand that my anesthesiologist and/or anesthetist is not an employee or agent of [facility] or [employer]. He or she works for [facility]    As the patient asking for anesthesia services, I agree to:  Allow the anesthesiologist (anesthesia doctor) to give me medicine and do additional procedures as necessary. Some examples are: Starting or using an “IV” to give me medicine, fluids or blood during my procedure, and having a breathing tube placed to help me breathe when I’m asleep (intubation). In the event that my heart stops working properly, I understand that my anesthesiologist will make every effort to sustain my life, unless otherwise directed by [facility] Do Not Resuscitate documents.  Tell my anesthesia doctor before my procedure:  If I am pregnant.  The last time that I ate or drank.  iii. All of the medicines I take (including prescriptions, herbal supplements, and pills I can buy without a prescription (including street drugs/illegal medications). Failure to inform my anesthesiologist about these medicines may increase my risk of anesthetic complications.  iv.If I am allergic to anything or have had a reaction to anesthesia before.  I understand how the anesthesia medicine will help me (benefits).  I understand that with any type of anesthesia medicine there are risks:  The most common risks are: nausea, vomiting, sore throat, muscle soreness, damage to my eyes, mouth, or teeth (from breathing tube placement).  Rare risks include: remembering what happened during my procedure, allergic reactions to medications, injury to my airway, heart, lungs, vision, nerves, or muscles  and in extremely rare instances death.  My doctor has explained to me other choices available to me for my care (alternatives).  Pregnant Patients (“epidural”):  I understand that the risks of having an epidural (medicine given into my back to help control pain during labor), include itching, low blood pressure, difficulty urinating, headache or slowing of the baby’s heart. Very rare risks include infection, bleeding, seizure, irregular heart rhythms and nerve injury.  Regional Anesthesia (“spinal”, “epidural”, & “nerve blocks”):  I understand that rare but potential complications include headache, bleeding, infection, seizure, irregular heart rhythms, and nerve injury.    _____________________________________________________________________________  Patient (or Representative) Signature/Relationship to Patient  Date   Time    _____________________________________________________________________________   Name (if used)    Language/Organization   Time    _____________________________________________________________________________  Nurse Anesthetist Signature     Date   Time  _____________________________________________________________________________  Anesthesiologist Signature     Date   Time  I have discussed the procedure and information above with the patient (or patient’s representative) and answered their questions. The patient or their representative has agreed to have anesthesia services.    _____________________________________________________________________________  Witness        Date   Time  I have verified that the signature is that of the patient or patient’s representative, and that it was signed before the procedure  Patient Name: Tarik Turpin     : 1950                 Printed: 2024 at 2:34 PM    Medical Record #: [id number]                                            Page 1 of 1  ----------ANESTHESIA CONSENT----------

## (undated) NOTE — LETTER
08/01/20        [address] [facility] [id number]      Dear Levi Vick,    [facility] records indicate that you have outstanding lab work and or testing that was ordered for you and has not yet been completed:  Orders Placed This Encounte

## (undated) NOTE — LETTER
2/24/2022              [address] [place] [id number]         Dear Ju Avila,    It has come to my attention that you missed your last appointment with me. As you know, regular medical attention is essential to maintaining your health. Please contact the office at your earliest convenience to reschedule. I look forward to seeing you soon. Please call our office and tell them that you need to schedule a pressure check with Dr. Sandrine Gavin.        Sincerely,    Krish Brewer MD  [facility]  [facility] [address]  [phone]        Document electronically generated by:  hSeeba Rasheed

## (undated) NOTE — LETTER
2/5/2024              Tarik Turpin        [address]        [facility] [id number]       To Whom It May Concern,    Tarik Turpin is under my medical care. It is my recommendation that he receive the RSV vaccine as he would benefit from being vaccinated.    Sincerely,        Logan Hernandez MD  [address] [place] [id number] [phone]

## (undated) NOTE — LETTER
July 14, 2020         [address]      Patient: Ashvin Costa   YOB: 1950   Date of Visit: 7/14/2020       Dear Dr. Wild Ervin MD,    I saw your patient, Ashvin Costa, on 7/14/2020.  Enclosed is my consu

## (undated) NOTE — LETTER
June 24, 2019    [address]     Patient: Dina Byrd   YOB: 1950   Date of Visit: 6/24/2019       Dear Dr. Silvia Mcnally MD:    Thank you for referring Dina Byrd to me for evaluation.  Here is my assess Probiotic Product (PROBIOTIC DAILY OR) Take by mouth.  Disp:  Rfl:        Allergies:  No Known Allergies    ROS:     ROS     Positive for: Eyes    Negative for: Constitutional, Gastrointestinal, Neurological, Skin, Genitourinary, Musculoskeletal, HENT, Endo Diagnoses and Plan:     Glaucoma suspect of both eyes  Discussed with patient that he is a glaucoma suspect based on increased cupping of the optic nerves in both eyes. Retinal photos taken today to document optic nerves.   Glaucoma diagnostic testing ord

## (undated) NOTE — LETTER
[address], [place], [place]    Authorization for Surgical Operation and Procedure                               I hereby authorize Eulalio Hernandez MD, my physician and his/her assistants (if applicable), which may include medical students, residents, and/or fellows, to perform the following surgical operation/ procedure and administer such anesthesia as may be determined necessary by my physician: Operation/Procedure name (s) CORONARY ARTERY BYPASS GRAFT; POSSIBLE ENDOSCOPIC VEIN HARVEST on Carolina Montanez   2.   I recognize that during the surgical operation/procedure, unforeseen conditions may necessitate additional or different procedures than those listed above.  I, therefore, further authorize and request that the above-named surgeon, assistants, or designees perform such procedures as are, in their judgment, necessary and desirable.    3.   My surgeon/physician has discussed prior to my surgery the potential benefits, risks and side effects of this procedure; the likelihood of achieving goals; and potential problems that might occur during recuperation.  They also discussed reasonable alternatives to the procedure, including risks, benefits, and side effects related to the alternatives and risks related to not receiving this procedure.  I have had all my questions answered and I acknowledge that no guarantee has been made as to the result that may be obtained.    4.   Should the need arise during my operation/procedure, which includes change of level of care prior to discharge, I also consent to the administration of blood and/or blood products.  Further, I understand that despite careful testing and screening of blood or blood products by collecting agencies, I may still be subject to ill effects as a result of receiving a blood transfusion and/or blood products.  The following are some, but not all, of the potential risks that can occur: fever and allergic reactions,  hemolytic reactions, transmission of diseases such as Hepatitis, AIDS and Cytomegalovirus (CMV) and fluid overload.  In the event that I wish to have an autologous transfusion of my own blood, or a directed donor transfusion, I will discuss this with my physician.  Check only if Refusing Blood or Blood Products  I understand refusal of blood or blood products as deemed necessary by my physician may have serious consequences to my condition to include possible death. I hereby assume responsibility for my refusal and release the hospital, its personnel, and my physicians from any responsibility for the consequences of my refusal.    o  Refuse   5.   I authorize the use of any specimen, organs, tissues, body parts or foreign objects that may be removed from my body during the operation/procedure for diagnosis, research or teaching purposes and their subsequent disposal by hospital authorities.  I also authorize the release of specimen test results and/or written reports to my treating physician on the hospital medical staff or other referring or consulting physicians involved in my care, at the discretion of the Pathologist or my treating physician.    6.   I consent to the photographing or videotaping of the operations or procedures to be performed, including appropriate portions of my body for medical, scientific, or educational purposes, provided my identity is not revealed by the pictures or by descriptive texts accompanying them.  If the procedure has been photographed/videotaped, the surgeon will obtain the original picture, image, videotape or CD.  The hospital will not be responsible for storage, release or maintenance of the picture, image, tape or CD.    7.   I consent to the presence of a  or observers in the operating room as deemed necessary by my physician or their designees.    8.   I recognize that in the event my procedure results in extended X-Ray/fluoroscopy time, I may develop a  skin reaction.    9. If I have a Do Not Attempt Resuscitation (DNAR) order in place, that status will be suspended while in the operating room, procedural suite, and during the recovery period unless otherwise explicitly stated by me (or a person authorized to consent on my behalf). The surgeon or my attending physician will determine when the applicable recovery period ends for purposes of reinstating the DNAR order.  10. Patients having a sterilization procedure: I understand that if the procedure is successful the results will be permanent and it will therefore be impossible for me to inseminate, conceive, or bear children.  I also understand that the procedure is intended to result in sterility, although the result has not been guaranteed.   11. I acknowledge that my physician has explained sedation/analgesia administration to me including the risk and benefits I consent to the administration of sedation/analgesia as may be necessary or desirable in the judgment of my physician.    I CERTIFY THAT I HAVE READ AND FULLY UNDERSTAND THE ABOVE CONSENT TO OPERATION and/or OTHER PROCEDURE.     ____________________________________  _________________________________        ______________________________  Signature of Patient    Signature of Responsible Person                Printed Name of Responsible Person                                      ____________________________________  _____________________________                ________________________________  Signature of Witness        Date  Time         Relationship to Patient    STATEMENT OF PHYSICIAN My signature below affirms that prior to the time of the procedure; I have explained to the patient and/or his/her legal representative, the risks and benefits involved in the proposed treatment and any reasonable alternative to the proposed treatment. I have also explained the risks and benefits involved in refusal of the proposed treatment and alternatives to the  proposed treatment and have answered the patient's questions. If I have a significant financial interest in a co-management agreement or a significant financial interest in any product or implant, or other significant relationship used in this procedure/surgery, I have disclosed this and had a discussion with my patient.     _____________________________________________________              _____________________________  (Signature of Physician)                                                                                         (Date)                                   (Time)  Patient Name: Carolina Montanez      : 1950      Printed: 2025     Medical Record #: [id number]                                      Page 1 of 1

## (undated) NOTE — LETTER
9/14/2021              Pavan Stone        [address] [phone]         To Whom It May Concern,    Pavan Stone is under my medical care. He contracted COVID-19 in July 2021. He has made a full recovery. If you have any questions feel free to contact my office at your convenience.     Sincerely,        Della Hartley MD  [facility], [address] [place] [id number] [phone]        Document electronically generated by:  Della Hartley MD

## (undated) NOTE — LETTER
Chidi Melo, [facility]       03/25/21        Patient: Rosalia Pickard   YOB: 1950   Date of Visit: 3/25/2021       Dear  Dr. Polly Mcpherson MD,      Thank you for referring Rosalia Pickard to my practice.   Please find my

## (undated) NOTE — LETTER
[facility]  Administration of Anesthesia  Carolina NG agree to be cared for by a physician anesthesiologist alone and/or with a nurse anesthetist, who is specially trained to monitor me and give me medicine to put me to sleep or keep me comfortable during my procedure    I understand that my anesthesiologist and/or anesthetist is not an employee or agent of [facility] or [employer]. He or she works for [facility]    As the patient asking for anesthesia services, I agree to:  Allow the anesthesiologist (anesthesia doctor) to give me medicine and do additional procedures as necessary. Some examples are: Starting or using an “IV” to give me medicine, fluids or blood during my procedure, and having a breathing tube placed to help me breathe when I’m asleep (intubation). In the event that my heart stops working properly, I understand that my anesthesiologist will make every effort to sustain my life, unless otherwise directed by [facility] Do Not Resuscitate documents.  Tell my anesthesia doctor before my procedure:  If I am pregnant.  The last time that I ate or drank.  iii. All of the medicines I take (including prescriptions, herbal supplements, and pills I can buy without a prescription (including street drugs/illegal medications). Failure to inform my anesthesiologist about these medicines may increase my risk of anesthetic complications.  iv.If I am allergic to anything or have had a reaction to anesthesia before.  I understand how the anesthesia medicine will help me (benefits).  I understand that with any type of anesthesia medicine there are risks:  The most common risks are: nausea, vomiting, sore throat, muscle soreness, damage to my eyes, mouth, or teeth (from breathing tube placement).  Rare risks include: remembering what happened during my procedure, allergic reactions to medications, injury to my airway, heart, lungs, vision, nerves, or  muscles and in extremely rare instances death.  My doctor has explained to me other choices available to me for my care (alternatives).  Pregnant Patients (“epidural”):  I understand that the risks of having an epidural (medicine given into my back to help control pain during labor), include itching, low blood pressure, difficulty urinating, headache or slowing of the baby’s heart. Very rare risks include infection, bleeding, seizure, irregular heart rhythms and nerve injury.  Regional Anesthesia (“spinal”, “epidural”, & “nerve blocks”):  I understand that rare but potential complications include headache, bleeding, infection, seizure, irregular heart rhythms, and nerve injury.    _____________________________________________________________________________  Patient (or Representative) Signature/Relationship to Patient  Date   Time    _____________________________________________________________________________   Name (if used)    Language/Organization   Time    _____________________________________________________________________________  Nurse Anesthetist Signature     Date   Time  _____________________________________________________________________________  Anesthesiologist Signature     Date   Time  I have discussed the procedure and information above with the patient (or patient’s representative) and answered their questions. The patient or their representative has agreed to have anesthesia services.    _____________________________________________________________________________  Witness        Date   Time  I have verified that the signature is that of the patient or patient’s representative, and that it was signed before the procedure  Patient Name: Carolina Montanez     : 1950                 Printed: 2025 at 1:54 PM    Medical Record #: [id number]                                            Page 1 of 1  ----------ANESTHESIA CONSENT----------

## (undated) NOTE — LETTER
8/15/2019              Stone Niño        [address] [phone]         Dear Harley Epley records indicate that the tests, Echocardiogram and Lexiscan Stress Test, ordered for you by Tania Hein MD have not been done.

## (undated) NOTE — LETTER
11/18/2019              Óscar Sanchez        [address] [phone]         Dear Avinash Humphrey,    [facility] records indicate that the lab test (Lipid Panel) ordered for you by Efrain Lynne MD have not been done.  If you have, in fact, a

## (undated) NOTE — LETTER
May 13, 2021    [address]     Patient: Rosalia Pickard   YOB: 1950   Date of Visit: 5/13/2021       Dear Dr. Polly Mcpherson MD:    Thank you for referring Rosalia Pickard to me for evaluation.  Here is my assessm Dispense Refill   • Omeprazole 40 MG Oral Capsule Delayed Release Take 1 capsule (40 mg total) by mouth daily. Before a meal 30 capsule 2   • atorvastatin 40 MG Oral Tab Take 1 tablet (40 mg total) by mouth nightly.  90 tablet 0   • Budesonide-Formoterol Fu Synephrine @ 11:16 AM            Slit Lamp and Fundus Exam     Slit Lamp Exam       Right Left    Lids/Lashes Dermatochalasis, Meibomian gland dysfunction Dermatochalasis, Meibomian gland dysfunction    Conjunctiva/Sclera Ocular Melanosis Temp pinguecula, medication, but will continue to observe. Patient verbalized understanding. Age-related nuclear cataract of both eyes  Discussed mild cataracts in both eyes that are not affecting vision and are not surgical at this time. New glasses Rx given.  Sug